# Patient Record
Sex: FEMALE | Employment: STUDENT | URBAN - METROPOLITAN AREA
[De-identification: names, ages, dates, MRNs, and addresses within clinical notes are randomized per-mention and may not be internally consistent; named-entity substitution may affect disease eponyms.]

---
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
## 2022-01-03 NOTE — PROGRESS NOTES
[facility]            SUBJECTIVE:     Manda Lincoln is a 21 year old female [employer]  with presenting to clinic today for follow up of bilateral hip and low back pain.    Overall things are the same. She had about three weeks off over break. She didn't skate at all and her hips felt better but her back remained the same. After coming back to practice her hips were back to the same by the end of the first practice.     She did try the relafen and didn't notice much of a difference. She isn't taking any medications now. The only medications she can recall helping are the diclofenac after which she had a rash and so it was discontinued and the medrol dose back. With the steroids she remembers her back feeling a lot better and her hips improving but not resolving. She isn't sure how long the effect lasted.     She denies any personal history of autoimmune disorders, thyroid disorders or rheumatologic conditions. She has had problems with her thumb, elbow and knee but these are all injury related. No other joint involvement. No nail or hair changes. She does get a red rash on her upper cheeks, not extending over the bridge of her nose. It comes out when her heart rate is elevated, like with exercise and is common for her, people will often ask if she has a sun burn it is so noticeable. She also will get a red rash on her legs with heat sometimes, either when she is sweating or when she is exposed to heat such as in the shower.     She does have a history of GI issues with constipation, bloating and gas. She had a normal work up by GI but was referred to a dietician and she eliminated gluten, poultry and eggs and this resolved her issues. She also has a lactose intolerance but takes lactaid and that is fine. She will occasionally have flares but usually can trace this back to her diet.     No autoimmune disorders in her family that she knows of. No  "one with bad joint issues that she is aware of. Her grandfather has bad rashes that they have tried to treat and hasn't work, she isn't sure what it is. Her grandmother who passed away may have had an autoimmune disorder and required a lung transplant, but she is unsure. No family history of lupus, RA, raynaud's, thyroid conditions.     No issue with losing weight. No fevers or chills. Has been feeling tired for a long time, feels more pronounced this year. .      Is has been taking Vit D daily for the makes 1-2 months.      PMH, Medications and Allergies were reviewed and updated as needed.    ROS:  As noted above otherwise negative.    There is no problem list on file for this patient.      Current Outpatient Medications   Medication Sig Dispense Refill     escitalopram (LEXAPRO) 20 MG tablet Take 1 tablet (20 mg) by mouth daily 93 tablet 0     escitalopram (LEXAPRO) 20 MG tablet Take 1 tablet (20 mg) by mouth daily 30 tablet 3     nabumetone (RELAFEN) 750 MG tablet Take 1 tablet (750 mg) by mouth 2 times daily 60 tablet 0     predniSONE (DELTASONE) 20 MG tablet Take 2 tablets (40 mg) by mouth daily 14 tablet 0              OBJECTIVE:     Vitals:   Vitals:    01/04/22 0930   BP: 116/69   Pulse: 74   Weight: 78.3 kg (172 lb 9.6 oz)   Height: 1.753 m (5' 9\")     BMI: Body mass index is 25.49 kg/m .    Gen:  Well nourished and in no acute distress  HEENT: Extraocular movement intact.  Neck: supple  Skin: No rash, erythema, ecchymosis or obvious abnormality  Psych: Euthymic   Neuro: Alert and oriented.           ASSESSMENT & PLAN:      Manda was seen today for hip pain and back pain.    Diagnoses and all orders for this visit:    Bilateral hip pain, Lumbar Back Pain: Persistent bilateral hip pain and lumbar back pain with concern for possible inflammatory or autoimmune process. No clear family history of autoimmune diseases although patient does have a gluten intolerance, has had several reactions to medications " and does get a facial rash that is similar to a malar rash but does not involve the bridge of her nose. Given her persistent symptoms will pursue further rheumatologic work up as well as treat with 7 day course of prednisone followed by increased dose of NSAID. If workup is revealing and her hips continue to bother diagnostic injection of hip should be considered.  -     Cyclic Citrullinated Peptide Antibody IgG; Future  -     Rheumatoid factor; Future  -     Anti Nuclear Neisha IgG by IFA with Reflex; Future  -     CRP inflammation; Future  -     Erythrocyte sedimentation rate auto; Future  -     Vitamin D Deficiency; Future  -     CBC with platelets differential; Future  -     HLA-B27 Typing; Future  -     predniSONE (DELTASONE) 20 MG tablet; Take 2 tablets (40 mg) by mouth daily  -     nabumetone (RELAFEN) 750 MG tablet; Take 1 tablet (750 mg) by mouth 2 times daily after completion of prednisone  -     Discussed possible next steps including potential diagnostic femoral acetabular hip injection    Options for treatment and/or follow-up care were reviewed with the patient and , Kenya Meredith ATC. Patient was actively involved in the decision making process. Patient verbalized understanding and was in agreement with the plan.    The patient was discussed with Dr.Suzanne MARY Coon MD, CAQ, CCD    Sol Palomo MD  Primary Care Sports Medicine Fellow

## 2022-01-03 NOTE — PROGRESS NOTES
[facility]  Eliel ATC follow-up note  Date of service performed: 10/26/2021    Concern/injury: B hips    Started Alta on new rehab program. She reports her hips are feeling decent. Rehab program below.    MONDAY TUESDAY WEDNESDAY THURSDAY FRIDAY            ? Straight leg raise - over dumbbell 2x5 each side    ? Quadruped CARS 10x around each side    ? Posterior Pelvic Tilts x20    ? Posterior Pelvic Tilt + March 2x8 each side    ? Clamshells with band 2x20 each side    ? Single Leg Glute Bridge 2x10 each side    ? Swiss ball squats against wall 2x12    ? Single leg balance with hip flexion 2x12 each side    ? Single leg squat to seat 2x6 each side   ? Straight leg raise - over dumbbell 2x5 each    ? Quadruped CARS 10x around each side    ? Posterior Pelvic Tilts x20    ? Single leg balance with half circles with band 2x8 each side    ? Reverse step up 2x10 each side    ? Standing 4-way hip 2x10 each way    ? Fire Hydrants 2x12 each side    ? Cup  2x6     ? Glute bridge with ball squeeze + leg extension 2x5   ? Straight leg raise - over dumbbell 2x5 each    ? Quadruped CARS 10x around each side    ? Posterior Pelvic Tilts x20    ? Swiss ball squats against wall 2x12    ? Lateral band walk - straight leg 2x    ? Monster walks 2x    ? Clamshells with band 2x20 each side    ? Quadruped opposite arm/leg 2x8 each    ? Plank with hip flexion 2x10 each side   ? Straight leg raise - over dumbbell 2x5 each    ? Quadruped CARS 10x around each side    ? Posterior Pelvic Tilts with marches 2x8 each side    ? Windshield Wipers 2x10 each side    ? Single leg balance with hip flexion 2x12 each side    ? Ball squeezes 2x25    ? Cup  2x6    ? Eccentric hamstring curl 2x10    ? Single leg squat to seat 2x6    ? Standing 4 way hip 2x10 each way       ? Straight leg raise - over dumbbell 2x5 each    ? Quadruped CARS 10x around each side    ? Posterior Pelvic Titls x20    ? Lateral band walk -  bent knee 2x    ? Monster walk 2x    ? Fire Hydrants 2x12    ? Single leg balance with half circles with band 2x8 each side    ? Plank Step Outs 2x10 each side         Kenya Meredith, ATC

## 2022-01-03 NOTE — PROGRESS NOTES
[facility]  [facility] ATC follow-up note  Date of service performed: 12/6/2021    Concern/injury: Bilateral hip pain, low back pain    Alta has continued to have bilateral hip pain and SI joint pain over the past several weeks. This got extremely worse for her (mentally) while on the [place] trip over [facility] as well as this past weekend at [place]. She is getting minimal playing time and that is frustrating for her as well. She has been diligent with her rehab, but does not feel that this is helping much with the hip or back pain. She does not seem interested in any prescription medication or an injection. She is willing to see Dr. Coon for evaluation. I have also offered to schedule her an appointment with Dr. Freed for her hips, but she has declined this for right now.    Assessment/plan: She saw Dr. Coon today. Will get MRI of her low back. Started on Relafen (allergic reaction to voltaren in the past). 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 Follow up with Dr. Coon after imaging.    Kenya Meredith, ATC

## 2022-01-03 NOTE — PROGRESS NOTES
[facility]  Eliel ATC follow-up note  Date of service performed: 11/11/2021    Concern/injury: Bilateral hip pain, SI joint pain    Alta has started to notice increasing SI joint pain, along with her bilateral hip pain. She has continued to be diligent with her rehab daily.     Treatment : R hip cupping, L hip graston. Saw Dr. Navas at his office for dry needling in bilateral hips/low back.    Kenya Meredith, ATC

## 2022-01-03 NOTE — PROGRESS NOTES
[facility]  Eliel ATC follow-up note  Date of service performed: 11/22/2021    Concern/injury: Bilateral hip pain, low back pain    Treatment: Rehab, MET on hips, cupping low back + left sided glute.    Kenya Meredith, ATC

## 2022-01-03 NOTE — PROGRESS NOTES
"[facility]  Eliel ATC follow-up note  Date of service performed: 11/5/2021    Concern/injury: Bilateral hip pain    Alta has been consistent with her rehab this week. She notices her hip pain improves when she \"recovers well\" the night before. She has a woop band that tracks this. She gives an example of not recovering well per her woop band on Wednesday night and feels that her hip pain was much worse on Thursday. On Thursday, we did glute and hip flexor releases/ART.      Kenya Meredith, ATC      "

## 2022-01-03 NOTE — PROGRESS NOTES
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 She has continued to come in for rehab with me this week. Got MRI of back this weekend and will meet with Dr. Coon following [place].      Kenya Meredith, ATC

## 2022-01-03 NOTE — PROGRESS NOTES
[facility] ATHLETICS  Eliel ATC follow-up note  Date of service performed: 1/3/2022    Concern/injury: B hips, low back pain    Alta has returned to practice for three days. The first day of practice was manageable, but yesterday and today she reports her hip pain has worsened to the point she is unable to do rehab prior to practice.    Assessment/plan: Follow up with Dr. Hendricks tomorrow to review MRIs and discuss work up for possible rheumatologic cause of some of her pain.     Kenya Meredith, ATC

## 2022-01-04 ENCOUNTER — OFFICE VISIT (OUTPATIENT)
Dept: FAMILY MEDICINE | Facility: CLINIC | Age: 22
End: 2022-01-04
Payer: COMMERCIAL

## 2022-01-04 ENCOUNTER — LAB (OUTPATIENT)
Dept: LAB | Facility: CLINIC | Age: 22
End: 2022-01-04
Payer: COMMERCIAL

## 2022-01-04 VITALS
SYSTOLIC BLOOD PRESSURE: 116 MMHG | DIASTOLIC BLOOD PRESSURE: 69 MMHG | WEIGHT: 172.6 LBS | BODY MASS INDEX: 25.56 KG/M2 | HEART RATE: 74 BPM | HEIGHT: 69 IN

## 2022-01-04 DIAGNOSIS — M25.552 BILATERAL HIP PAIN: Primary | ICD-10-CM

## 2022-01-04 DIAGNOSIS — M54.50 LUMBAR BACK PAIN: ICD-10-CM

## 2022-01-04 DIAGNOSIS — M25.551 BILATERAL HIP PAIN: Primary | ICD-10-CM

## 2022-01-04 DIAGNOSIS — M25.551 BILATERAL HIP PAIN: ICD-10-CM

## 2022-01-04 DIAGNOSIS — M25.552 BILATERAL HIP PAIN: ICD-10-CM

## 2022-01-04 LAB
BASOPHILS # BLD AUTO: 0.1 10E3/UL (ref 0–0.2)
BASOPHILS NFR BLD AUTO: 1 %
CRP SERPL-MCNC: <2.9 MG/L (ref 0–8)
EOSINOPHIL # BLD AUTO: 0.2 10E3/UL (ref 0–0.7)
EOSINOPHIL NFR BLD AUTO: 2 %
ERYTHROCYTE [DISTWIDTH] IN BLOOD BY AUTOMATED COUNT: 11.9 % (ref 10–15)
ERYTHROCYTE [SEDIMENTATION RATE] IN BLOOD BY WESTERGREN METHOD: 5 MM/HR (ref 0–20)
HCT VFR BLD AUTO: 41.1 % (ref 35–47)
HGB BLD-MCNC: 13.6 G/DL (ref 11.7–15.7)
IMM GRANULOCYTES # BLD: 0 10E3/UL
IMM GRANULOCYTES NFR BLD: 0 %
LYMPHOCYTES # BLD AUTO: 2.6 10E3/UL (ref 0.8–5.3)
LYMPHOCYTES NFR BLD AUTO: 29 %
MCH RBC QN AUTO: 30.1 PG (ref 26.5–33)
MCHC RBC AUTO-ENTMCNC: 33.1 G/DL (ref 31.5–36.5)
MCV RBC AUTO: 91 FL (ref 78–100)
MONOCYTES # BLD AUTO: 0.7 10E3/UL (ref 0–1.3)
MONOCYTES NFR BLD AUTO: 7 %
NEUTROPHILS # BLD AUTO: 5.4 10E3/UL (ref 1.6–8.3)
NEUTROPHILS NFR BLD AUTO: 61 %
NRBC # BLD AUTO: 0 10E3/UL
NRBC BLD AUTO-RTO: 0 /100
PLATELET # BLD AUTO: 220 10E3/UL (ref 150–450)
RBC # BLD AUTO: 4.52 10E6/UL (ref 3.8–5.2)
WBC # BLD AUTO: 9 10E3/UL (ref 4–11)

## 2022-01-04 PROCEDURE — 86038 ANTINUCLEAR ANTIBODIES: CPT | Mod: 90 | Performed by: PATHOLOGY

## 2022-01-04 PROCEDURE — 36415 COLL VENOUS BLD VENIPUNCTURE: CPT | Performed by: PATHOLOGY

## 2022-01-04 PROCEDURE — 85652 RBC SED RATE AUTOMATED: CPT | Performed by: PATHOLOGY

## 2022-01-04 PROCEDURE — 86431 RHEUMATOID FACTOR QUANT: CPT | Mod: 90 | Performed by: PATHOLOGY

## 2022-01-04 PROCEDURE — 86200 CCP ANTIBODY: CPT | Mod: 90 | Performed by: PATHOLOGY

## 2022-01-04 PROCEDURE — 86039 ANTINUCLEAR ANTIBODIES (ANA): CPT | Mod: 90 | Performed by: PATHOLOGY

## 2022-01-04 PROCEDURE — 81374 HLA I TYPING 1 ANTIGEN LR: CPT | Performed by: PATHOLOGY

## 2022-01-04 PROCEDURE — 99000 SPECIMEN HANDLING OFFICE-LAB: CPT | Performed by: PATHOLOGY

## 2022-01-04 PROCEDURE — 82306 VITAMIN D 25 HYDROXY: CPT | Mod: 90 | Performed by: PATHOLOGY

## 2022-01-04 PROCEDURE — 86140 C-REACTIVE PROTEIN: CPT | Performed by: PATHOLOGY

## 2022-01-04 PROCEDURE — 85025 COMPLETE CBC W/AUTO DIFF WBC: CPT | Performed by: PATHOLOGY

## 2022-01-04 RX ORDER — PREDNISONE 20 MG/1
40 TABLET ORAL DAILY
Qty: 14 TABLET | Refills: 0 | Status: SHIPPED | OUTPATIENT
Start: 2022-01-04 | End: 2022-02-16

## 2022-01-04 ASSESSMENT — MIFFLIN-ST. JEOR: SCORE: 1622.29

## 2022-01-04 NOTE — LETTER
1/4/2022      RE: Manda Lincoln  [address]  [place] [facility] [place] [address]       [facility]            SUBJECTIVE:     Manda Lincoln is a 21 year old female [employer]  with presenting to clinic today for follow up of bilateral hip and low back pain.    Overall things are the same. She had about three weeks off over break. She didn't skate at all and her hips felt better but her back remained the same. After coming back to practice her hips were back to the same by the end of the first practice.     She did try the relafen and didn't notice much of a difference. She isn't taking any medications now. The only medications she can recall helping are the diclofenac after which she had a rash and so it was discontinued and the medrol dose back. With the steroids she remembers her back feeling a lot better and her hips improving but not resolving. She isn't sure how long the effect lasted.     She denies any personal history of autoimmune disorders, thyroid disorders or rheumatologic conditions. She has had problems with her thumb, elbow and knee but these are all injury related. No other joint involvement. No nail or hair changes. She does get a red rash on her upper cheeks, not extending over the bridge of her nose. It comes out when her heart rate is elevated, like with exercise and is common for her, people will often ask if she has a sun burn it is so noticeable. She also will get a red rash on her legs with heat sometimes, either when she is sweating or when she is exposed to heat such as in the shower.     She does have a history of GI issues with constipation, bloating and gas. She had a normal work up by GI but was referred to a dietician and she eliminated gluten, poultry and eggs and this resolved her issues. She also has a lactose intolerance but takes lactaid and that is fine. She will occasionally have flares but usually can trace  "this back to her diet.     No autoimmune disorders in her family that she knows of. No one with bad joint issues that she is aware of. Her grandfather has bad rashes that they have tried to treat and hasn't work, she isn't sure what it is. Her grandmother who passed away may have had an autoimmune disorder and required a lung transplant, but she is unsure. No family history of lupus, RA, raynaud's, thyroid conditions.     No issue with losing weight. No fevers or chills. Has been feeling tired for a long time, feels more pronounced this year. .      Is has been taking Vit D daily for the makes 1-2 months.      PMH, Medications and Allergies were reviewed and updated as needed.    ROS:  As noted above otherwise negative.    There is no problem list on file for this patient.      Current Outpatient Medications   Medication Sig Dispense Refill     escitalopram (LEXAPRO) 20 MG tablet Take 1 tablet (20 mg) by mouth daily 93 tablet 0     escitalopram (LEXAPRO) 20 MG tablet Take 1 tablet (20 mg) by mouth daily 30 tablet 3     nabumetone (RELAFEN) 750 MG tablet Take 1 tablet (750 mg) by mouth 2 times daily 60 tablet 0     predniSONE (DELTASONE) 20 MG tablet Take 2 tablets (40 mg) by mouth daily 14 tablet 0              OBJECTIVE:     Vitals:   Vitals:    01/04/22 0930   BP: 116/69   Pulse: 74   Weight: 78.3 kg (172 lb 9.6 oz)   Height: 1.753 m (5' 9\")     BMI: Body mass index is 25.49 kg/m .    Gen:  Well nourished and in no acute distress  HEENT: Extraocular movement intact.  Neck: supple  Skin: No rash, erythema, ecchymosis or obvious abnormality  Psych: Euthymic   Neuro: Alert and oriented.           ASSESSMENT & PLAN:      Manda was seen today for hip pain and back pain.    Diagnoses and all orders for this visit:    Bilateral hip pain, Lumbar Back Pain: Persistent bilateral hip pain and lumbar back pain with concern for possible inflammatory or autoimmune process. No clear family history of autoimmune diseases " although patient does have a gluten intolerance, has had several reactions to medications and does get a facial rash that is similar to a malar rash but does not involve the bridge of her nose. Given her persistent symptoms will pursue further rheumatologic work up as well as treat with 7 day course of prednisone followed by increased dose of NSAID. If workup is revealing and her hips continue to bother diagnostic injection of hip should be considered.  -     Cyclic Citrullinated Peptide Antibody IgG; Future  -     Rheumatoid factor; Future  -     Anti Nuclear Neisha IgG by IFA with Reflex; Future  -     CRP inflammation; Future  -     Erythrocyte sedimentation rate auto; Future  -     Vitamin D Deficiency; Future  -     CBC with platelets differential; Future  -     HLA-B27 Typing; Future  -     predniSONE (DELTASONE) 20 MG tablet; Take 2 tablets (40 mg) by mouth daily  -     nabumetone (RELAFEN) 750 MG tablet; Take 1 tablet (750 mg) by mouth 2 times daily after completion of prednisone  -     Discussed possible next steps including potential diagnostic femoral acetabular hip injection    Options for treatment and/or follow-up care were reviewed with the patient and , Kenya Meredith ATC. Patient was actively involved in the decision making process. Patient verbalized understanding and was in agreement with the plan.    The patient was discussed with Dr.Suzanne MARY Coon MD, CAQ, CCD    Sol Palomo MD  Primary Care Sports Medicine Fellow      Attending Note:   I have discussed this patient and have reviewed the clinical presentation and progress note with the fellow. I agree with the treatment plan as outlined. The plan was formulated with the fellow on the day of the patient's visit.   Lacey Coon MD, CAQ, CCD  [facility]  Sports Medicine and Bone Health

## 2022-01-05 LAB
ANA PAT SER IF-IMP: ABNORMAL
ANA SER QL IF: ABNORMAL
ANA TITR SER IF: ABNORMAL {TITER}
CCP AB SER IA-ACNC: 2.8 U/ML
DEPRECATED CALCIDIOL+CALCIFEROL SERPL-MC: 22 UG/L (ref 20–75)
RHEUMATOID FACT SER NEPH-ACNC: 9 IU/ML

## 2022-01-06 LAB
B LOCUS: NORMAL
B27TEST METHOD: NORMAL

## 2022-01-09 NOTE — PROGRESS NOTES
Attending Note:   I have discussed this patient and have reviewed the clinical presentation and progress note with the fellow. I agree with the treatment plan as outlined. The plan was formulated with the fellow on the day of the patient's visit.   Lacey Coon MD, CAQ, CCD  [facility]  Sports Medicine and Bone Health

## 2022-01-25 ENCOUNTER — OFFICE VISIT (OUTPATIENT)
Dept: FAMILY MEDICINE | Facility: CLINIC | Age: 22
End: 2022-01-25
Payer: COMMERCIAL

## 2022-01-25 VITALS
HEIGHT: 69 IN | BODY MASS INDEX: 25.68 KG/M2 | HEART RATE: 78 BPM | SYSTOLIC BLOOD PRESSURE: 120 MMHG | DIASTOLIC BLOOD PRESSURE: 74 MMHG | WEIGHT: 173.4 LBS

## 2022-01-25 DIAGNOSIS — M54.50 LUMBAR BACK PAIN: ICD-10-CM

## 2022-01-25 DIAGNOSIS — M25.552 BILATERAL HIP PAIN: ICD-10-CM

## 2022-01-25 DIAGNOSIS — M25.551 BILATERAL HIP PAIN: ICD-10-CM

## 2022-01-25 DIAGNOSIS — E55.9 VITAMIN D DEFICIENCY: Primary | ICD-10-CM

## 2022-01-25 ASSESSMENT — MIFFLIN-ST. JEOR: SCORE: 1625.92

## 2022-01-25 NOTE — LETTER
"  1/25/2022      RE: Manda Belcher  [address]  [place] [address]       S: f/u labs and prednisone one week followed by NSAID for B hip and LBP.  -Prednisone helped tremendously and is lessening in terms of her hips (60% improved) but still good in her low back.   -No side effects or problems with the medications.    -Unsure how much the Relafen 750mg bid is helping.   -She has been able to fully practice and play.  These practices have been very demanding due to [place] last weekend and then [employer] this coming weekend.  They should be easier for a while after the [employer] series.         Current Outpatient Medications   Medication     escitalopram (LEXAPRO) 20 MG tablet     nabumetone (RELAFEN) 750 MG tablet     vitamin D3 (CHOLECALCIFEROL) 1.25 MG (87485 UT) capsule     escitalopram (LEXAPRO) 20 MG tablet     predniSONE (DELTASONE) 20 MG tablet     No current facility-administered medications for this visit.         O:  NAD  /74   Pulse 78   Ht 1.753 m (5' 9\")   Wt 78.7 kg (173 lb 6.4 oz)   BMI 25.61 kg/m        Results for JENNIE BELCHER (MRN [id number]) as of 1/25/2022 13:48   Ref. Range 10/11/2021 15:57 10/11/2021 16:32 12/15/2021 13:36 12/15/2021 14:12 1/4/2022 14:47   CRP Inflammation Latest Ref Range: 0.0 - 8.0 mg/L     <2.9   Cyclic Citrullinated Peptide Antibody, IgG Latest Ref Range: <7.0 U/mL     2.8   Rheumatoid Factor Latest Ref Range: <12 IU/mL     9   Vitamin D Deficiency screening Latest Ref Range: 20 - 75 ug/L     22   WBC Latest Ref Range: 4.0 - 11.0 10e3/uL     9.0   Hemoglobin Latest Ref Range: 11.7 - 15.7 g/dL     13.6   Hematocrit Latest Ref Range: 35.0 - 47.0 %     41.1   Platelet Count Latest Ref Range: 150 - 450 10e3/uL     220   RBC Count Latest Ref Range: 3.80 - 5.20 10e6/uL     4.52   MCV Latest Ref Range: 78 - 100 fL     91   MCH Latest Ref Range: 26.5 - 33.0 pg     30.1   MCHC Latest Ref Range: 31.5 - 36.5 g/dL     33.1   RDW Latest Ref Range: 10.0 - 15.0 %   "   11.9   % Neutrophils Latest Units: %     61   % Lymphocytes Latest Units: %     29   % Monocytes Latest Units: %     7   % Eosinophils Latest Units: %     2   % Basophils Latest Units: %     1   Absolute Basophils Latest Ref Range: 0.0 - 0.2 10e3/uL     0.1   Absolute Eosinophils Latest Ref Range: 0.0 - 0.7 10e3/uL     0.2   Absolute Immature Granulocytes Latest Ref Range: <=0.4 10e3/uL     0.0   Absolute Lymphocytes Latest Ref Range: 0.8 - 5.3 10e3/uL     2.6   Absolute Monocytes Latest Ref Range: 0.0 - 1.3 10e3/uL     0.7   % Immature Granulocytes Latest Units: %     0   Absolute Neutrophils Latest Ref Range: 1.6 - 8.3 10e3/uL     5.4   Absolute NRBCs Latest Units: 10e3/uL     0.0   NRBCs per 100 WBC Latest Ref Range: <1 /100     0   Sed Rate Latest Ref Range: 0 - 20 mm/hr     5   B locus Unknown     B27 Neg   T81Cbdo Method Unknown     SSOP   ANTI NUCLEAR RAYO IGG BY IFA WITH REFLEX Unknown     Rpt (A)   MR ELBOW RIGHT W/O CONTRAST Unknown    Rpt    MR HIP LEFT W/O CONTRAST Unknown Rpt       MR HIP RIGHT W/O CONTRAST Unknown  Rpt      MR LUMBAR SPINE W/O CONTRAST Unknown   Rpt     TOÑO interpretation Latest Ref Range: Negative      Borderline Positive (A)   TOÑO pattern 1 Unknown     Speckled   TOÑO titer 1 Unknown     1:40       A: 1. Chronic bilateral hip (B BABATUNDE and possible labral tearing) and LBP in a UM :  Good response to prednisone.  Lab testing is essentially reasurring except for a borderline positive  TOÑO  1:40 speckled.   2. Vitamin D Deficiency    P:  We discussed some medication alternatives to see if she can get a better improvement on NSAIDs as we cannot continue to prescribe repeated courses of prednisone.  She understands and feels that she would like to keep playing without significant changes to the treatment for now since she is still doing much better than previously.     -We will treat her with Vit D3 15054 international unit(s) per week for 12 weeks. She can take one pill twice  per week for two weeks and then switch to weekly.     -We discussed the TOÑO result and I explained that false positives in young women are common with this test.  I would like to repeat it in approx 6 weeks and add some additional testing.      -We revisited the idea of a diagnostic hip joint injection vs a diagnostic and therapeutic injection with steroids to help manage her hip pain.  We will hold off on this for now.     Kenya Meredith ATC, was present for the entire appt.     Lacey Coon MD, CAQ, FACSM, CCD  [facility]  Sports Medicine and Bone Health  Team Physician;  Athletics

## 2022-01-25 NOTE — PROGRESS NOTES
"S: f/u labs and prednisone one week followed by NSAID for B hip and LBP.  -Prednisone helped tremendously and is lessening in terms of her hips (60% improved) but still good in her low back.   -No side effects or problems with the medications.    -Unsure how much the Relafen 750mg bid is helping.   -She has been able to fully practice and play.  These practices have been very demanding due to [place] last weekend and then [employer] this coming weekend.  They should be easier for a while after the [employer] series.         Current Outpatient Medications   Medication     escitalopram (LEXAPRO) 20 MG tablet     nabumetone (RELAFEN) 750 MG tablet     vitamin D3 (CHOLECALCIFEROL) 1.25 MG (30898 UT) capsule     escitalopram (LEXAPRO) 20 MG tablet     predniSONE (DELTASONE) 20 MG tablet     No current facility-administered medications for this visit.         O:  NAD  /74   Pulse 78   Ht 1.753 m (5' 9\")   Wt 78.7 kg (173 lb 6.4 oz)   BMI 25.61 kg/m        Results for JENNIE BELCHER (MRN [id number]) as of 1/25/2022 13:48   Ref. Range 10/11/2021 15:57 10/11/2021 16:32 12/15/2021 13:36 12/15/2021 14:12 1/4/2022 14:47   CRP Inflammation Latest Ref Range: 0.0 - 8.0 mg/L     <2.9   Cyclic Citrullinated Peptide Antibody, IgG Latest Ref Range: <7.0 U/mL     2.8   Rheumatoid Factor Latest Ref Range: <12 IU/mL     9   Vitamin D Deficiency screening Latest Ref Range: 20 - 75 ug/L     22   WBC Latest Ref Range: 4.0 - 11.0 10e3/uL     9.0   Hemoglobin Latest Ref Range: 11.7 - 15.7 g/dL     13.6   Hematocrit Latest Ref Range: 35.0 - 47.0 %     41.1   Platelet Count Latest Ref Range: 150 - 450 10e3/uL     220   RBC Count Latest Ref Range: 3.80 - 5.20 10e6/uL     4.52   MCV Latest Ref Range: 78 - 100 fL     91   MCH Latest Ref Range: 26.5 - 33.0 pg     30.1   MCHC Latest Ref Range: 31.5 - 36.5 g/dL     33.1   RDW Latest Ref Range: 10.0 - 15.0 %     11.9   % Neutrophils Latest Units: %     61   % Lymphocytes Latest Units: %     29   % " Monocytes Latest Units: %     7   % Eosinophils Latest Units: %     2   % Basophils Latest Units: %     1   Absolute Basophils Latest Ref Range: 0.0 - 0.2 10e3/uL     0.1   Absolute Eosinophils Latest Ref Range: 0.0 - 0.7 10e3/uL     0.2   Absolute Immature Granulocytes Latest Ref Range: <=0.4 10e3/uL     0.0   Absolute Lymphocytes Latest Ref Range: 0.8 - 5.3 10e3/uL     2.6   Absolute Monocytes Latest Ref Range: 0.0 - 1.3 10e3/uL     0.7   % Immature Granulocytes Latest Units: %     0   Absolute Neutrophils Latest Ref Range: 1.6 - 8.3 10e3/uL     5.4   Absolute NRBCs Latest Units: 10e3/uL     0.0   NRBCs per 100 WBC Latest Ref Range: <1 /100     0   Sed Rate Latest Ref Range: 0 - 20 mm/hr     5   B locus Unknown     B27 Neg   H69Ccui Method Unknown     SSOP   ANTI NUCLEAR RAYO IGG BY IFA WITH REFLEX Unknown     Rpt (A)   MR ELBOW RIGHT W/O CONTRAST Unknown    Rpt    MR HIP LEFT W/O CONTRAST Unknown Rpt       MR HIP RIGHT W/O CONTRAST Unknown  Rpt      MR LUMBAR SPINE W/O CONTRAST Unknown   Rpt     TOÑO interpretation Latest Ref Range: Negative      Borderline Positive (A)   TOÑO pattern 1 Unknown     Speckled   TOÑO titer 1 Unknown     1:40       A: 1. Chronic bilateral hip (B BABATUNDE and possible labral tearing) and LBP in a UM :  Good response to prednisone.  Lab testing is essentially reasurring except for a borderline positive  TOÑO  1:40 speckled.   2. Vitamin D Deficiency    P:  We discussed some medication alternatives to see if she can get a better improvement on NSAIDs as we cannot continue to prescribe repeated courses of prednisone.  She understands and feels that she would like to keep playing without significant changes to the treatment for now since she is still doing much better than previously.     -We will treat her with Vit D3 90642 international unit(s) per week for 12 weeks. She can take one pill twice per week for two weeks and then switch to weekly.     -We discussed the TOÑO result and I  explained that false positives in young women are common with this test.  I would like to repeat it in approx 6 weeks and add some additional testing.      -We revisited the idea of a diagnostic hip joint injection vs a diagnostic and therapeutic injection with steroids to help manage her hip pain.  We will hold off on this for now.     Kenya Meredith ATC, was present for the entire appt.     Lacey Coon MD, CAQ, FACSM, CCD  [facility]  Sports Medicine and Bone Health  Team Physician;  Athletics

## 2022-02-16 ENCOUNTER — OFFICE VISIT (OUTPATIENT)
Dept: ORTHOPEDICS | Facility: CLINIC | Age: 22
End: 2022-02-16
Payer: COMMERCIAL

## 2022-02-16 VITALS
SYSTOLIC BLOOD PRESSURE: 122 MMHG | DIASTOLIC BLOOD PRESSURE: 71 MMHG | HEIGHT: 69 IN | HEART RATE: 87 BPM | WEIGHT: 174.4 LBS | BODY MASS INDEX: 25.83 KG/M2

## 2022-02-16 DIAGNOSIS — F41.8 DEPRESSION WITH ANXIETY: Primary | ICD-10-CM

## 2022-02-16 RX ORDER — ESCITALOPRAM OXALATE 10 MG/1
10 TABLET ORAL DAILY
Qty: 90 TABLET | Refills: 3 | Status: SHIPPED | OUTPATIENT
Start: 2022-02-16 | End: 2023-02-18

## 2022-02-16 RX ORDER — ESCITALOPRAM OXALATE 20 MG/1
20 TABLET ORAL DAILY
Qty: 90 TABLET | Refills: 0 | Status: SHIPPED | OUTPATIENT
Start: 2022-02-16 | End: 2022-04-05

## 2022-02-16 ASSESSMENT — ANXIETY QUESTIONNAIRES
3. WORRYING TOO MUCH ABOUT DIFFERENT THINGS: MORE THAN HALF THE DAYS
2. NOT BEING ABLE TO STOP OR CONTROL WORRYING: SEVERAL DAYS
7. FEELING AFRAID AS IF SOMETHING AWFUL MIGHT HAPPEN: NOT AT ALL
GAD7 TOTAL SCORE: 8
1. FEELING NERVOUS, ANXIOUS, OR ON EDGE: SEVERAL DAYS
6. BECOMING EASILY ANNOYED OR IRRITABLE: NEARLY EVERY DAY
IF YOU CHECKED OFF ANY PROBLEMS ON THIS QUESTIONNAIRE, HOW DIFFICULT HAVE THESE PROBLEMS MADE IT FOR YOU TO DO YOUR WORK, TAKE CARE OF THINGS AT HOME, OR GET ALONG WITH OTHER PEOPLE: VERY DIFFICULT
5. BEING SO RESTLESS THAT IT IS HARD TO SIT STILL: NOT AT ALL

## 2022-02-16 ASSESSMENT — PATIENT HEALTH QUESTIONNAIRE - PHQ9: 5. POOR APPETITE OR OVEREATING: SEVERAL DAYS

## 2022-02-16 NOTE — PROGRESS NOTES
"[facility] CLINIC FOLLOW UP    Manda Lincoln MRN# [id number]   Age: 21 year old YOB: 2000           Chief Complaint:     Mental health follow up          History of Present Illness:     22 yo [place] [employer] [facility] here to follow up anxiety and depression. She has been on lexapro 20 mg which had been working well, but more recently has felt increased anxiety and having panic attacks and feeling more emotionally labile. Wondering if she can increase med dose. Has not been seeing psychologist. No SI/SH.           Medications:     Current Outpatient Medications   Medication Sig     escitalopram (LEXAPRO) 10 MG tablet Take 1 tablet (10 mg) by mouth daily     escitalopram (LEXAPRO) 20 MG tablet Take 1 tablet (20 mg) by mouth daily     escitalopram (LEXAPRO) 20 MG tablet Take 1 tablet (20 mg) by mouth daily     vitamin D3 (CHOLECALCIFEROL) 1.25 MG (04684 UT) capsule Take 1 capsule (50,000 Units) by mouth every 7 days     No current facility-administered medications for this visit.             Allergies:      Allergies   Allergen Reactions     Amoxicillin      Penicillin G             Review of Systems:   A comprehensive 10 point review of systems (constitutional, ENT, cardiac, peripheral vascular, respiratory, GI, , Musculoskeletal, skin, Neurological) was performed and found to be negative except as described in this note.           Physical Exam:   COMPLETE EXAMINATION:   VITAL SIGNS: /71   Pulse 87   Ht 1.753 m (5' 9\")   Wt 79.1 kg (174 lb 6.4 oz)   BMI 25.75 kg/m    GEN: Alert, well-nourished, and in no distress.   HEENT:   Head: Normocephalic and atraumatic.   Eyes: PERRLA, EOMI  NEURO: Alert and oriented to person, place, and time. Gait normal.   PSYCH: Mood, memory, affect and judgment normal.         Assessment:     Depression/anxiety        Plan:     1. Increase lexapro to 30 mg (20 +10 mg tablets) daily.   2. Start sports psych.  3. Follow up in 1 month.    Karina Najera " DEB Meredith was present for the entire visit.

## 2022-02-16 NOTE — LETTER
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 She has been on lexapro 20 mg which had been working well, but more recently has felt increased anxiety and having panic attacks and feeling more emotionally labile. Wondering if she can increase med dose. Has not been seeing psychologist. No SI/SH.           Medications:     Current Outpatient Medications   Medication Sig     escitalopram (LEXAPRO) 10 MG tablet Take 1 tablet (10 mg) by mouth daily     escitalopram (LEXAPRO) 20 MG tablet Take 1 tablet (20 mg) by mouth daily     escitalopram (LEXAPRO) 20 MG tablet Take 1 tablet (20 mg) by mouth daily     vitamin D3 (CHOLECALCIFEROL) 1.25 MG (00574 UT) capsule Take 1 capsule (50,000 Units) by mouth every 7 days     No current facility-administered medications for this visit.             Allergies:      Allergies   Allergen Reactions     Amoxicillin      Penicillin G             Review of Systems:   A comprehensive 10 point review of systems (constitutional, ENT, cardiac, peripheral vascular, respiratory, GI, , Musculoskeletal, skin, Neurological) was performed and found to be negative except as described in this note.           Physical Exam:   COMPLETE EXAMINATION:   VITAL SIGNS: /71   Pulse 87   Ht 1.753 m (5' 9\")   Wt 79.1 kg (174 lb 6.4 oz)   BMI 25.75 kg/m    GEN: Alert, well-nourished, and in no distress.   HEENT:   Head: Normocephalic and atraumatic.   Eyes: PERRLA, EOMI  NEURO: Alert and oriented to person, place, and time. Gait normal.   PSYCH: Mood, memory, affect and judgment normal.         Assessment:     Depression/anxiety        Plan:     1. Increase lexapro to 30 mg (20 +10 mg tablets) " daily.   2. Start sports psych.  3. Follow up in 1 month.    Karina Najera M.D.    DELMY Meredith was present for the entire visit.        Karina Najera MD

## 2022-02-17 ASSESSMENT — ANXIETY QUESTIONNAIRES: GAD7 TOTAL SCORE: 8

## 2022-03-04 NOTE — PROGRESS NOTES
[facility] ATHLETICS  [facility] DELMY follow-up note  Date of service performed: 2/28/2022    Concern/injury: Bilateral hip pain    Alta saw Dr. Freed and [facility] in [place] today. Went over imaging and treatment options, including surgical and conservative options. Since she has struggled this season while being diligent with rehab and not finding many treatment options to relieve the pain, Alta has elected to do surgery on both hips after the season ends.     Because both of her hips bother her the same amount and they alternate which one is worse, she has elected to do her right hip first and her left hip 4 weeks after that.    She is scheduled for surgery on her right hip on April 15th and her left hip on May 17th. Both will be performed at [facility] in [place]. She checked with her insurance to ensure it is in network, which she says that it is. She will have pre-op physical with Dr. Najera prior to the surgeries. She will continue doing daily rehab to strengthen both hips and core before surgery.    Kenya Meredith, ATC

## 2022-03-04 NOTE — PROGRESS NOTES
"[facility] ATHLETICS  [facility] ATC follow-up note  Date of service performed: 2/3/2022    Concern/injury: Bilateral hip pain    Alta has begun to notice an increase in pain in her hips again. Does not feel like the prescription anti-inflammatory is doing much, but also not taking it consistently.     She noticed soreness in her hips yesterday after doing bear crawls the day before as part of her rehab. Today, she has a lot of pain in both hips and is unable to do rehab before or after practice due tot he pain. She reports a \"pinching\" pain in her left hip. We did PROM and there was a large \"clunk\" in her left hip when doing motion.    Assessment/plan: Continue rehab. Will plan on having her see Dr. Freed to discuss treatment options for when the season is over.    Kenya Meredith, ATC      "

## 2022-03-04 NOTE — PROGRESS NOTES
"[facility] ATHLETICS  [facility] ATC follow-up note  Date of service performed: 2/19/2022    Concern/injury: Bilateral Hip Pain    Alta discontinued use of prescription anti-inflammatory, finding that it did not provide much relief. Her hip pain has returned after completing the prednisone. She has been taking OTC naproxen 1x/day on days it is really bothering her and on game days. She has been able to practice and play fully, but she will notice they get extremely painful with a \"deep\" pain when she does a lot of transitions or battling on the ice. She has been extremely diligent with daily rehab. She has an appointment with Dr. Freed on 2/28 to discuss treatment options after the season ends.       Kenya Meredith, ATC      "

## 2022-03-04 NOTE — PROGRESS NOTES
[facility] ATHLETICS  Eliel ATC follow-up note  Date of service performed: 1/15/2021    Concern/injury: Bilateral hip pain    Alta reports hip pain is still doing better after being off of prednisone for a few days. She has begun to take relafen, but does not feel like it is doing much. She does not have any LBP currently.       Kenya Meredith, ATC

## 2022-03-04 NOTE — PROGRESS NOTES
[facility] ATHLETICS  Eliel ATC follow-up note  Date of service performed: 1/5/2022    Concern/injury: Bilateral hips, low back pain    Assessment/plan: Alta began taking prednisone prescribed to her yesterday for seven days. She reports some improvement in her back pain but not in her hips yet.    She is currently practicing fully, but has been modified in the weight room - limiting hip flexion and lower body exercises that increase pain in her hips.     Kenya Meredith, ATC

## 2022-03-04 NOTE — PROGRESS NOTES
[facility] ATHLETICS  Eliel ATC follow-up note  Date of service performed: 1/17/2022    Concern/injury: Bilateral hip pain    New rehab plan that Alta will begin today.    MONDAY TUESDAY WEDNESDAY THURSDAY FRIDAY          EVERY DAY: CARS (range of motion - on hands and knees, circles), Prone CARS (on stomach - bend knee up lift hip up then move leg out to side and let leg come back down to ground), dumbbell hip overs     ? Side Plank Clam Shells 2x15 each side    ? 3 point touch - single leg squat with band 2x5 times around each side    ? Bear sit knee kick outs 2x8 each leg    ? Kneeling hip flexion - 2x8 (hold 2 seconds each rep) each side    ? Standing to kneeling on pad 2x6 each side    ? Single leg glute bridge with abduction sweep 2x8 each side    ? Seated hip internal rotation (band around ankles, ball between knees) 2x12    ? Single leg balance with cross body reach with opposite arm (left leg reach with right arm; right leg reach with left arm) 2x8 each side                     ? Single leg squat (assisted with ball on bent knee against wall) 2x6    ? Single leg balance - straight leg forward and backwards with band 2x15    ? Quadruped hip extension to abduction 2x10 each side    ? Reverse clam shells (band around ankles, lift top ankle off of bottom ankle) 2x15    ? Single leg balance on bosu ball + catch    ? Lying hamstring curl with glider 2x15    ? Seated hip flexion with band 2x12 each side    ? Side plank hip abduction with band 2x10 each side                          ? Side Plank Clam Shells 2x15    ? Lateral band walk     ? Monster walk    ? Bosu ball squat + catch 2x10    ? Opposite arm/leg with swiss ball 2x8 each side    ? Single leg deadlift 2x15    ? Kneeling hip flexion - 2x8 (hold 2 seconds each rep) each side    ? Wall Sit 2x30 sec    ? Deadbugs (arms only - band around hands)       ? 3 point touch single leg squat with band 2x5 times around    ? Bear sit knee kick outs 2x8  each leg    ? Banded squares 4x5 times around    ? Quadruped hip extension to abduction 2x10 each side    ? Swiss ball squats with overhead ball hold 2x10    ? Glider - abduction and backwards half circles 2x8 each side with band    ? Standing to kneeling with pad 2x6 each    ? Single leg glute bridge with abduction sweep 2x8 each side       ? Side Plank Clam Shells 2x15    ? Reverse clam shells (band around ankles, lift top ankle off of bottom ankle) 2x15    ? Single leg squat (assisted with ball on bent knee against wall) 2x6    ?Seated hip internal rotation (band around ankles, ball between knees) 2x12    ? Opposite arm/leg with swiss ball 2x8 each side    ? Lying hamstring curl with glider 2x15    ? Side plank hip abduction with band 2x10 each side    ? Single leg balance with cross body reach with opposite arm (left leg reach with right arm; right leg reach with left arm) 2x8 each side             Kenya Meredith, ATC

## 2022-03-04 NOTE — PROGRESS NOTES
[facility] ATHLETICS  Eliel BROCK follow-up note  Date of service performed: 1/11/2022    Concern/injury: Bilateral hips, low back pain    Today is Alta's last day of 7 day course of prednisone. She reports this has helped tremendously with her LBP. Says it has helped with her hip pain as well, but she does still have some hip pain.    She continues to do daily rehab. Will update her rehab exercises, as they are getting easier for her.      Kenya Meredith ATC

## 2022-03-05 ENCOUNTER — OFFICE VISIT (OUTPATIENT)
Dept: ORTHOPEDICS | Facility: CLINIC | Age: 22
End: 2022-03-05
Payer: COMMERCIAL

## 2022-03-05 ENCOUNTER — DOCUMENTATION ONLY (OUTPATIENT)
Dept: FAMILY MEDICINE | Facility: CLINIC | Age: 22
End: 2022-03-05
Payer: COMMERCIAL

## 2022-03-05 DIAGNOSIS — S06.0X0A CONCUSSION WITHOUT LOSS OF CONSCIOUSNESS, INITIAL ENCOUNTER: Primary | ICD-10-CM

## 2022-03-05 NOTE — LETTER
3/5/2022      RE: Manda Lincoln  [address]  [place] [address]       [facility] GAME DAY CONCUSSION CONSULT    Manda Lincoln MRN# [id number]   Age: 21 year old YOB: 2000           Chief Complaint:     Possible concussion          History of Present Illness:     Manda Lincoln is a 21 year old female who is seen for evaluation of a possible concussion that occurred in the second period of the game when she slid hard into the metal bar of the net striking the back of her head. Injury was witnessed by medical staff and reviewed again on video. She said she does not have a recollection of the mechanism of injury, but does not think she lost consciousness. She was slow to get up and initially felt dazed with a headache. She was taken out of the game and immediately evaluated in the training room She says she now feels fine.      Immediate Symptoms: felt dazed, out of it, headache      Past pertinent history:      Depression: Yes:      Anxiety: Yes:      Learning disability: no     ADHD: no     Past History of concussions: No    Patient's past medical, surgical, social and family histories reviewed:  Depression    REVIEW OF SYSTEMS:  CONSTITUTIONAL:NEGATIVE for fever, chills, change in weight  INTEGUMENTARY/SKIN: NEGATIVE for worrisome rashes, moles or lesions  MUSCULOSKELETAL:See HPI above  NEURO: NEGATIVE for weakness, dizziness or paresthesias         Medications:     Current Outpatient Medications   Medication Sig     escitalopram (LEXAPRO) 10 MG tablet Take 1 tablet (10 mg) by mouth daily     escitalopram (LEXAPRO) 20 MG tablet Take 1 tablet (20 mg) by mouth daily     vitamin D3 (CHOLECALCIFEROL) 1.25 MG (32387 UT) capsule Take 1 capsule (50,000 Units) by mouth every 7 days     escitalopram (LEXAPRO) 20 MG tablet Take 1 tablet (20 mg) by mouth daily     No current facility-administered medications for this visit.             Allergies:      Allergies   Allergen Reactions      Amoxicillin      Penicillin G             Review of Systems:   A comprehensive 10 point review of systems (constitutional, ENT, cardiac, peripheral vascular, respiratory, GI, , Musculoskeletal, skin, Neurological) was performed and found to be negative except as described in this note.           Physical Exam:   COMPLETE EXAMINATION:   VITAL SIGNS: There were no vitals taken for this visit.  GEN: healthy, alert and no distress   HEENT:   Head: Normocephalic and atraumatic.   Eyes: PERRLA, EOMI, Nystagmus: No; Painful Eye movements: No  Mouth/Throat: MMM, No erythema or exudate.   Neck:  supple, non-tender, FULL ROM  CV: S1S2 normal, RRR, no murmur  CHEST: CTA, Easy effort, No rales or wheezes  ABD: Soft. Nontender/nondistended, no HSM/mass, no rebound/guarding.  SKIN: No rash, warmth or erythema  PSYCH:mentation appears normal and affect normal/bright  NEURO: Normal strength and tone, mentation intact and speech normal  Gait: normal in skates  Balance Testing:         Double leg stance in skates eyes closed normal  Convergence Testing: Normal (6-8cm)    VOMS: Smooth Pursuit: normal    Saccades horizontal: normal    Saccades vertical: normal    VORx1: normal    VOR cancellation: normal    Cognitive:  AxO to time, place, period and score of game            Assessment:     Presumed Concussion         Plan:     1. Discussed diagnosis and pulled from game. Concussion protocol will be followed.  2. Recommended rest from physical and cognitive activities.    3. Follow up in training room tomorrow for SCAT and reassessment.    DELMY Meredith was present for the entire visit.          Karina Najera MD

## 2022-03-06 NOTE — PROGRESS NOTES
[facility] GAME DAY CONCUSSION CONSULT    Manda Lincoln MRN# [id number]   Age: 21 year old YOB: 2000           Chief Complaint:     Possible concussion          History of Present Illness:     Manda Lincoln is a 21 year old female who is seen for evaluation of a possible concussion that occurred in the second period of the game when she slid hard into the metal bar of the net striking the back of her head. Injury was witnessed by medical staff and reviewed again on video. She said she does not have a recollection of the mechanism of injury, but does not think she lost consciousness. She was slow to get up and initially felt dazed with a headache. She was taken out of the game and immediately evaluated in the training room She says she now feels fine.      Immediate Symptoms: felt dazed, out of it, headache      Past pertinent history:      Depression: Yes:      Anxiety: Yes:      Learning disability: no     ADHD: no     Past History of concussions: No    Patient's past medical, surgical, social and family histories reviewed:  Depression    REVIEW OF SYSTEMS:  CONSTITUTIONAL:NEGATIVE for fever, chills, change in weight  INTEGUMENTARY/SKIN: NEGATIVE for worrisome rashes, moles or lesions  MUSCULOSKELETAL:See HPI above  NEURO: NEGATIVE for weakness, dizziness or paresthesias         Medications:     Current Outpatient Medications   Medication Sig     escitalopram (LEXAPRO) 10 MG tablet Take 1 tablet (10 mg) by mouth daily     escitalopram (LEXAPRO) 20 MG tablet Take 1 tablet (20 mg) by mouth daily     vitamin D3 (CHOLECALCIFEROL) 1.25 MG (33162 UT) capsule Take 1 capsule (50,000 Units) by mouth every 7 days     escitalopram (LEXAPRO) 20 MG tablet Take 1 tablet (20 mg) by mouth daily     No current facility-administered medications for this visit.             Allergies:      Allergies   Allergen Reactions     Amoxicillin      Penicillin G             Review of Systems:   A comprehensive 10 point  review of systems (constitutional, ENT, cardiac, peripheral vascular, respiratory, GI, , Musculoskeletal, skin, Neurological) was performed and found to be negative except as described in this note.           Physical Exam:   COMPLETE EXAMINATION:   VITAL SIGNS: There were no vitals taken for this visit.  GEN: healthy, alert and no distress   HEENT:   Head: Normocephalic and atraumatic.   Eyes: PERRLA, EOMI, Nystagmus: No; Painful Eye movements: No  Mouth/Throat: MMM, No erythema or exudate.   Neck:  supple, non-tender, FULL ROM  CV: S1S2 normal, RRR, no murmur  CHEST: CTA, Easy effort, No rales or wheezes  ABD: Soft. Nontender/nondistended, no HSM/mass, no rebound/guarding.  SKIN: No rash, warmth or erythema  PSYCH:mentation appears normal and affect normal/bright  NEURO: Normal strength and tone, mentation intact and speech normal  Gait: normal in skates  Balance Testing:         Double leg stance in skates eyes closed normal  Convergence Testing: Normal (6-8cm)    VOMS: Smooth Pursuit: normal    Saccades horizontal: normal    Saccades vertical: normal    VORx1: normal    VOR cancellation: normal    Cognitive:  AxO to time, place, period and score of game            Assessment:     Presumed Concussion         Plan:     1. Discussed diagnosis and pulled from game. Concussion protocol will be followed.  2. Recommended rest from physical and cognitive activities.    3. Follow up in training room tomorrow for SCAT and reassessment.    DELMY Meredith was present for the entire visit.         Alert-The patient is alert, awake and responds to voice. The patient is oriented to time, place, and person. The triage nurse is able to obtain subjective information.

## 2022-03-09 NOTE — PROGRESS NOTES
"[facility] ATHLETICS  [facility] follow-up note  Date of service performed: 3/7/2022    Concern/injury: Beryl Holm comes in today for follow up for her concussion. She says she still feels \"off\". She comes in today wearing a hat, saying being outside increased symptoms due to the sun in conjunction with the snow outside making it extra bright. She says she completed some assignments last night but she does not feel like she did well on them. She had trouble concentrating and putting her thoughts together coherently. Screens make symptoms worse. She reports her vision is slightly blurry. She has poor vision in one eye from previous eye injury when she was younger.     Symptom inventory completed. She reports 19/22 symptoms and a severity score of 57/132. She says it was difficult to fall asleep, but she was able to stay asleep once she fell asleep.     Headache, don't feel right = 5  Pressure in head, neck pain, sensitivity to light, fatigue, drowsiness = 4  Blurred vision, feeling slowed down, feeling in a fog, difficulty concentrating, difficulty remembering = 3  Dizziness, sensitivity to noise, confusion, more emotional, irritability = 2  Sadness, trouble falling asleep = 1    Treatment:  - Heat neck  - Massage and traction/stretching of neck    Plan: Continue resting until symptoms decrease. [facility] concussion management plan is being followed.     Kenya Meredith, ATC      "

## 2022-03-09 NOTE — PROGRESS NOTES
"[facility] ATHLETICS  Eliel ATC initial assessment note  Date of service performed: 3/5/2022    Concern: Concussion  Type: Athletics related  Date of injury: 3/5/2022    S: Alta was playing in playoff game vs. [employer] when she hit her head on her own goalie's pad and then whiplashed back, hitting the back of her head on the base of the net. She was on the ice for a few moments but was able to get up on her own and skate to bench before AT arrived for assistance. She was immediately evaluated by myself and Dr. Najera. Dr. Najera's note is in Epic.     Initially, Alta reported to me that she \"didn't know what happened\" and that she had a headache in the back of her head (where she hit the net), and felt \"dazed\". When Dr. Najera arrived for evaluation, Alta said she was feeling better. Her concussion examination was normal, but we did not allow her to return to play. Alta did sit on the bench with the team for the rest of the game, but did not play. Symptoms were monitored during the game. She reported no symptoms to us.    A: Possible concussion    P: Alta did not return to play. She and her roommate were given instructions on symptoms to watch for and when to go to ER. She will check in with me tomorrow in the morning and with Dr. Palomo before the game.     Kenya Meredith, ATC          "

## 2022-03-09 NOTE — PROGRESS NOTES
[facility] ATHLETICS  Eliel ATC follow-up note  Date of service performed: 3/9/2022    Concern/injury: Concussion    Alta comes in today for check in for her concussion. Symptom inventory performed. 16 symptoms, severity score of 46.    Fatigue, drowsiness = 5  Neck pain, feeling slowed down, don't feel right = 4  Pressure in head, blurred vision, sensitivity to noise, feeling in a fog, difficulty concentrating = 3  Headache, dizziness, sensitivity to light = 2  Balance problems, difficulty remembering, nervous/anxious = 1    She continues to have neck pain, which we worked on today. She reports her hips have been more painful since not doing anything these past couple of days. Allowed her to get on a bike with zero resistance and low intensity for 10 mins. This did not increase symptoms. She tried to do some standing and lying hip rehab exercises. When she tried to do lateral band walks, her symptoms increased, so we discontinued the rehab at that time. Symptoms improved when she sat down and rested.     Treatment: cupping and graston of cervical musculature and upper trap. Traction/stretching/massage neck.     She did not stay to watch practice. She did go with team to [employer] for team outing. She was instructed not to golf while there, but could sit with teammates.     [facility] concussion management plan is being followed.    Kenya Meredith, ATC

## 2022-03-09 NOTE — PROGRESS NOTES
[facility] ATHLETICS  Eliel ATC follow-up note  Date of service performed: 3/8/2022    Concern/injury: Concussion    I did not see Alta today in person. She says symptoms are still about the same. She reports the base of her skull is tight/sore. She is getting a massage today with team massages.    Kenya Meredith, ATC

## 2022-03-09 NOTE — PROGRESS NOTES
"[facility] ATHLETICS  [facility] ATC follow-up note  Date of service performed: 3/6/2022    Concern/injury: Concussion    Subjective: Alta comes in this morning saying she \"knows she has a concussion\". She says symptoms began last night after the game when she went to the baseball game. She said she felt \"out of it\" and that an inning would go by and she wouldn't remember any of the plays.     Objective: SCAT5 performed.    Symptoms: 17/22 (feeling slowed down, feeling in a fog, don't feel right, fatigue = 5)  Symptom severity: 52/132  Orietation: 5/5  Immediate memory: 15/15  Concentration: 4/5  Neuro screening: WNL  Balance: 3 errors  Delayed recall: 2/5    VOMS:   Smooth pursuits: WNL  Horiztonal saccades: increased symptoms  Vertical saccades: WNL  Horiztonal VOR: increased symptoms  Vertical VOR: WNL  VMS: increased symptoms    Assessment/plan: Concussion.    Discussed with Dr. Palomo and Dr. Najera. Placed at RTL level 3. Spring break this coming week so no classes. Does have a couple of assignments she needs to work on.    Not cleared for any physical activity, including the game today. She did stay and watch, but had to go into lounge after 1st period due to symptoms increasing while being in the crowd.    [facility] concussion management plan is being followed.    Kenya Meredith, ATC      "

## 2022-03-12 ENCOUNTER — OFFICE VISIT (OUTPATIENT)
Dept: ORTHOPEDICS | Facility: CLINIC | Age: 22
End: 2022-03-12
Payer: COMMERCIAL

## 2022-03-12 DIAGNOSIS — S06.0X0D CONCUSSION WITHOUT LOSS OF CONSCIOUSNESS, SUBSEQUENT ENCOUNTER: Primary | ICD-10-CM

## 2022-03-12 NOTE — Clinical Note
Hey! I tried to co-sign the chart to you and it just signed the whole encounter but you should be able to create an addendum and just write throw in a note that you were there and agree with the plan.

## 2022-03-12 NOTE — LETTER
3/12/2022      RE: Manda Lincoln  [address]  [place] [address]       [facility]   Game Day Visit           SUBJECTIVE:     Manda Lincoln is a 21 year old female with a presenting to for follow up concussion.     Initial Injury: 3/5/22, no LOC, symptoms significantly worse on 3/6/22    She feels like she has been doing much better. She will still notice some blurred vision and difficulty concentrating on her vision when she is using her computer for too long. She did do some school work early in the week which was a little bit challenging, she hasn't had any other work to do since it is spring break. Her headache is improved. She is still sleeping quite a bit. For example last night she slept 9.5 hours and also took a nap. She hasn't been doing to practice or watching other games.      Total number of symptoms 12  Symptom severity 17      PMH, Medications and Allergies were reviewed and updated as needed.    ROS:  As noted above otherwise negative.    There is no problem list on file for this patient.      Current Outpatient Medications   Medication Sig Dispense Refill     escitalopram (LEXAPRO) 10 MG tablet Take 1 tablet (10 mg) by mouth daily 90 tablet 3     escitalopram (LEXAPRO) 20 MG tablet Take 1 tablet (20 mg) by mouth daily 90 tablet 0     escitalopram (LEXAPRO) 20 MG tablet Take 1 tablet (20 mg) by mouth daily 30 tablet 3     vitamin D3 (CHOLECALCIFEROL) 1.25 MG (54938 UT) capsule Take 1 capsule (50,000 Units) by mouth every 7 days 12 capsule 1              OBJECTIVE:     Gen:  Well nourished and in no acute distress  HEENT: Extraocular movement intact.  Neck: supple  Skin: No rash, erythema, ecchymosis or obvious abnormality  Psych: Euthymic   Neuro: Alert and oriented. EOMI. No difficulty with tracking. No nystagmus. VOMS normal with out any symptoms. Some difficulty with near point convergence, she never sees double but her left eye will have to refocus as she  tracks towards her face. Zero balance errors on double leg and tandem balance. 1 balance error on single leg.               ASSESSMENT & PLAN:      Manda was seen today for head injury.    Diagnoses and all orders for this visit:    Concussion without loss of consciousness, subsequent encounter: Concussion sustained on 3/5/22, symptoms have continued to improve daily and are significantly lower than they were at their peak. Alta at baseline has reduced visual acuity and peripheral vision of her left eye and she continues to have some visual symptoms with her concussion but these have also significantly improved over a relatively short period of time.   - continue to monitor symptoms  - plan to start return to play protocol when symptoms have resolved  - okay for light biking and physical therapy as long as not worsening symptoms  - continue to monitor visual symptoms       Options for treatment and/or follow-up care were reviewed with the patient and , Kenya Meredith ATC. Patient was actively involved in the decision making process. Patient verbalized understanding and was in agreement with the plan.    The patient was seen by and discussed with Dr. Karina Najera MD, CAQ    Sol Palomo MD  Primary Care Sports Medicine Fellow        Karina Najera MD

## 2022-03-12 NOTE — PROGRESS NOTES
[facility]   Game Day Visit           SUBJECTIVE:     Manda Lincoln is a 21 year old female with a presenting to for follow up concussion.     Initial Injury: 3/5/22, no LOC, symptoms significantly worse on 3/6/22    She feels like she has been doing much better. She will still notice some blurred vision and difficulty concentrating on her vision when she is using her computer for too long. She did do some school work early in the week which was a little bit challenging, she hasn't had any other work to do since it is spring break. Her headache is improved. She is still sleeping quite a bit. For example last night she slept 9.5 hours and also took a nap. She hasn't been doing to practice or watching other games.      Total number of symptoms 12  Symptom severity 17      PMH, Medications and Allergies were reviewed and updated as needed.    ROS:  As noted above otherwise negative.    There is no problem list on file for this patient.      Current Outpatient Medications   Medication Sig Dispense Refill     escitalopram (LEXAPRO) 10 MG tablet Take 1 tablet (10 mg) by mouth daily 90 tablet 3     escitalopram (LEXAPRO) 20 MG tablet Take 1 tablet (20 mg) by mouth daily 90 tablet 0     escitalopram (LEXAPRO) 20 MG tablet Take 1 tablet (20 mg) by mouth daily 30 tablet 3     vitamin D3 (CHOLECALCIFEROL) 1.25 MG (66205 UT) capsule Take 1 capsule (50,000 Units) by mouth every 7 days 12 capsule 1              OBJECTIVE:     Gen:  Well nourished and in no acute distress  HEENT: Extraocular movement intact.  Neck: supple  Skin: No rash, erythema, ecchymosis or obvious abnormality  Psych: Euthymic   Neuro: Alert and oriented. EOMI. No difficulty with tracking. No nystagmus. VOMS normal with out any symptoms. Some difficulty with near point convergence, she never sees double but her left eye will have to refocus as she tracks towards her face. Zero balance errors on double leg and tandem balance. 1 balance error  on single leg.               ASSESSMENT & PLAN:      Manda was seen today for head injury.    Diagnoses and all orders for this visit:    Concussion without loss of consciousness, subsequent encounter: Concussion sustained on 3/5/22, symptoms have continued to improve daily and are significantly lower than they were at their peak. Alta at baseline has reduced visual acuity and peripheral vision of her left eye and she continues to have some visual symptoms with her concussion but these have also significantly improved over a relatively short period of time.   - continue to monitor symptoms  - plan to start return to play protocol when symptoms have resolved  - okay for light biking and physical therapy as long as not worsening symptoms  - continue to monitor visual symptoms       Options for treatment and/or follow-up care were reviewed with the patient and , Kenya Meredith ATC. Patient was actively involved in the decision making process. Patient verbalized understanding and was in agreement with the plan.    The patient was seen by and discussed with Dr. Karina Najera MD, CAMORE Palomo MD  Primary Care Sports Medicine Fellow      I was present with the fellow during the history and exam.  I discussed the case with the fellow and agree with the findings as documented in the assessment and plan.   Karina Najera MD

## 2022-03-12 NOTE — LETTER
3/12/2022      RE: Manda Lincoln  [address]  [place] [facility] [address]       [facility]   Game Day Visit           SUBJECTIVE:     Manda Lincoln is a 21 year old female with a presenting to for follow up concussion.     Initial Injury: 3/5/22, no LOC, symptoms significantly worse on 3/6/22    She feels like she has been doing much better. She will still notice some blurred vision and difficulty concentrating on her vision when she is using her computer for too long. She did do some school work early in the week which was a little bit challenging, she hasn't had any other work to do since it is spring break. Her headache is improved. She is still sleeping quite a bit. For example last night she slept 9.5 hours and also took a nap. She hasn't been doing to practice or watching other games.      Total number of symptoms 12  Symptom severity 17      PMH, Medications and Allergies were reviewed and updated as needed.    ROS:  As noted above otherwise negative.    There is no problem list on file for this patient.      Current Outpatient Medications   Medication Sig Dispense Refill     escitalopram (LEXAPRO) 10 MG tablet Take 1 tablet (10 mg) by mouth daily 90 tablet 3     escitalopram (LEXAPRO) 20 MG tablet Take 1 tablet (20 mg) by mouth daily 90 tablet 0     escitalopram (LEXAPRO) 20 MG tablet Take 1 tablet (20 mg) by mouth daily 30 tablet 3     vitamin D3 (CHOLECALCIFEROL) 1.25 MG (97565 UT) capsule Take 1 capsule (50,000 Units) by mouth every 7 days 12 capsule 1              OBJECTIVE:     Gen:  Well nourished and in no acute distress  HEENT: Extraocular movement intact.  Neck: supple  Skin: No rash, erythema, ecchymosis or obvious abnormality  Psych: Euthymic   Neuro: Alert and oriented. EOMI. No difficulty with tracking. No nystagmus. VOMS normal with out any symptoms. Some difficulty with near point convergence, she never sees double but her left eye will have to refocus as she tracks  towards her face. Zero balance errors on double leg and tandem balance. 1 balance error on single leg.          ASSESSMENT & PLAN:      Manda was seen today for head injury.    Diagnoses and all orders for this visit:    Concussion without loss of consciousness, subsequent encounter: Concussion sustained on 3/5/22, symptoms have continued to improve daily and are significantly lower than they were at their peak. Alta at baseline has reduced visual acuity and peripheral vision of her left eye and she continues to have some visual symptoms with her concussion but these have also significantly improved over a relatively short period of time.   - continue to monitor symptoms  - plan to start return to play protocol when symptoms have resolved  - okay for light biking and physical therapy as long as not worsening symptoms  - continue to monitor visual symptoms       Options for treatment and/or follow-up care were reviewed with the patient and , Kenya Meredith ATC. Patient was actively involved in the decision making process. Patient verbalized understanding and was in agreement with the plan.    The patient was seen by and discussed with Dr. Karina Najera MD, CAQ    Sol Palomo MD  Primary Care Sports Medicine Fellow        Karina Najera MD

## 2022-03-16 NOTE — PROGRESS NOTES
[facility] ATHLETICS  [facility] ATC follow-up note  Date of service performed: 3/10/2022    Concern/injury: Concussion    Alta comes in today for a check in. She feels her symptoms are improving a little.     Symptom inventory:  14/22 symptoms  35/132 severity score  3 = headache, pressure in head, sensitivity to light, feeling slowed down, feeling in a fog, don't feel right, fatigue, drowsiness  2 = dizziness, blurred vision, sensitivity to noise  1 = neck pain, balance problems    Continues to have symptoms with horizontal eye movements.     Assessment/plan: Symptoms are improving, down from yesterday's 16 symptoms and severity score of 46. She did stay to watch practice today. Remains at RTL level 3, as [place] is currently on spring break. [facility] concussion management plan is being followed.     Kenya Meredith, ATC

## 2022-03-16 NOTE — PROGRESS NOTES
[facility] ATHLETICS  [facility] ATC follow-up note  Date of service performed: 3/12/2022    Concern/injury: Beryl Holm comes in for the game today and to follow up with Dr. Najera/Dr. Palomo. I did not see her yesterday, but she did go for a walk and said symptoms did not worsen with the walk.    Symptoms have improved significantly over the past couple of days. Currently has 12 symptoms with a severity score of 27/132. This is down from two days ago when she had 14 symptoms, severity score of 35.    2 = pressure in head, blurred vision, don't feel right, difficulty concentrating, drowsiness  1 = headache, neck pain, sensitivity to light, sensitivity to noise, feeling slowed down, feeling in a fog, fatigue    She got 9.5 hours of sleep last night, which is more than normal.    VOMS testing is improved and WNL.     Assessment/plan: She saw Dr. Palomo and Dr. Najera today for follow up. She may start RTP protocol when symptoms have resolved. She may do light biking and/or rehab for her hips as long as it does not worsen symptoms.     Currently, she has only done very light biking, keeping HR under 120 and rehab exercises. She is anxious to start skating again. She remains at a RTL level 3, which the [place] is on spring break. Will adjust next week once she has been in class and can determine if symptoms worsen. [facility] concussion management plan is being followed.    Kenya Meredith, ATC

## 2022-03-22 NOTE — PROGRESS NOTES
[facility] ATHLETICS  [facility] ATC follow-up note  Date of service performed: 3/21/2022    Concern/injury: Concussion    Alta comes in today with no concussion symptoms. She completed RTP level 2 - sport specific activity by skating. She did board to board skating 6x (12 reps total), as well as some puck handling and light shooting. She reports no symptoms during or after. She did do some light hip rehab after on ice as well.    Assessment/plan: Continue to progress on ice activity, as well as do some off ice activity to get HR higher. She will complete ImPACT test and see Dr. Najera or Dr. Palomo next week. [facility] concussion management plan is being followed.    Kenya Meredith, ATC

## 2022-03-22 NOTE — PROGRESS NOTES
[facility] ATHLETICS  [facility] ATC follow-up note  Date of service performed: 3/17/2022    Concern/injury: Concussion    Alta reports no concussion symptoms. She has done light rehab the past couple of days but nothing strenuous. She is going out of town tomorrow and through the weekend. We will begin on ice RTP progression next week. [facility] concussion management plan is being followed.      Kenya Meredith, ATC

## 2022-03-22 NOTE — PROGRESS NOTES
[facility] ATHLETICS  [facility] ATC follow-up note  Date of service performed: 3/15/2022    Concern/injury: Concussion    Alta comes in today stating her symptoms are much improved.She had no issues with her classes yesterday.     Symptoms have improved significantly. She had a total of 6 symptoms and a severity score of 6. Symptoms (score of 1 each): neck pain, dizziness, blurred vision, feeling slowed down, fatigue, drowsiness. She had 7.5 hours of sleep last night, which is normal for her.    She did more intense rehab, utilizing BFR and dowing step ups, single leg squat to seat and squats on bosu ball. Her HR got up to 166. She noticed no increase in symptoms.     Assessment/plan: Will plan on having her skate on her own and progress through RTP next week when [place] ice is available. She will likely do a modified return to hockey progression, as the season is over and there is not option for her to do full contact practices. We will get her HR up that high, though, and make sure she doesn't have symptoms.     RTL level 5 emailed out.    [facility] concussion management plan is being followed.    eKnya Meredith, ATC

## 2022-03-23 NOTE — PROGRESS NOTES
"[facility] ATHLETICS  [facility] ATC follow-up note  Date of service performed: 3/23/2022    Concern/injury: Concussion    Alta did 25 mins bike with BFR today, getting HR between 140-170bpm. She had no symptoms.    She did complain of \"dehydration symptoms\". When asked for further explanation, she said she sometimes feels lightheaded. She is unsure if this started before or after she suffered her concussion. She feels that she drinks enough water throughout the day.     Assessment/plan: Will continue to monitor the lightheaded feeling. It did not increase during or after biking. Will continue to progress physical activity and monitor symptoms. Will follow up with Dr. Najera next week.    Kenya Meredith, ATC      "

## 2022-03-24 ENCOUNTER — DOCUMENTATION ONLY (OUTPATIENT)
Dept: FAMILY MEDICINE | Facility: CLINIC | Age: 22
End: 2022-03-24
Payer: COMMERCIAL

## 2022-03-24 NOTE — PROGRESS NOTES
"[facility] ATHLETIC MEDICINE  [facility]   Sport Psychology Progress Note      Location of Visit: [facility]   Date of Visit: 3/24/22  Duration of Session: 50 minutes  Referred by:     Self-reported Concerns/Symptoms:  Anxiety/worry  Transition/adjustment  Other: Relationship break-up     \"I don't handle the unknown very well.\"     Suicide and Risk Assessment:  Recent suicidal thoughts: No  Past suicidal thoughts: No  Recent homicidal thoughts: No  Any attempts in the past: No  Any family/friends/loved ones die by suicide: No  Plan or considering various methods: No  Access to guns: No  Protective factors: no h/o suicide attempt, no plan or intent, no h/o risky impulsive behavior, no access to lethal means, h/o seeking help when needed, future oriented, feeling hopeful, commitment to family, good social support   and stable housing    Alta denies current urges to self-harm, homicidal ideation, suicidal ideation, means, plans, or intent.    Mental Status & Observations:  Alta appeared generally alert and oriented. Dress was appropriate to the weather and occasion. Grooming and hygiene were appropriate. Eye contact was good. Speech was of normal volume and normal. Thought processes were relevant, logical and goal-directed. Thought content was within normal limits with no evidence of psychotic or paranoid features. Memory appeared intact. She exhibited normal motor activity during the appointment. Mood was appropriate with congruent affect. Insight and judgment appeared age appropriate with good focus in session.  Behavior was candid and open    Observations & Response:   Alta reports that she is taking 30mg Lexapro () and is feeling good with her symptoms of anxiety. She reports that she struggled with significant hip pain this year in hockey and will be undergoing 2 surgeries in April and May . She reports that she is dating her " "boyfriend, Reese, who is also an athlete. She reports wanting to work on being less irritable \"if plans doing' go the way I want\" and more cognitively flexible/adapatable to letting go of uncontrollables. 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 She reports high level os support from her ATC and parents and new coaches.     Historically, she has noticed anxiety, low mood,  irritability, fatigue, indecisiveness, self-criticalness, loss of enjoyment/pleasure, sadness, loss of appetite and feeling down. She reports feeling agitated and restless often, and at at times feels so overwhelmed that she \" legs hard to where they bruise.\" She reports feeling as though she worries most days and feels it's negatively impacting her relationships. She experiences food allergies (e.g., eggs, dairy, chicken, gluten) and some anxiety particularly with social interactions. Known history of mental health in self: Yes, No previous mental health treatment but describes previous symptoms or experiences including tearfulness/crying, feeling low, panic attacks, anxiety about \"lots of things\" including performance/tests, and self-worth concerns. Ct also reports self-criticalness. CT reports that she notices crying multiple times x week and panic attacks 1 x per week where she feels chest tightness, difficulties breathing and \"can't control my thoughts.\" (Fall 2020) Ct also reports impatience with experiences at times where \"if I get an idea, I want to do it right away or I get discouraged.\"    Known history of mental health in family member(s): Yes: Brother was dx with depression and participated in counseling; ADHD; Grandfather- depression    Intervention:   Processed her decision to undergo surgeries this spring. Discussed hindsight bias, showing herself judy/compassion and measuring herself less on outcome and more on her adaptability to uncontrollables/unknowns. Shared human expectation research to validate the difficulties at times " "in being flexible in our behavioral responses. Validated her in her experiences in her injuries/pain and for listening to/honroing her instincts in her decision-making.     Coping skills, strengths and supports:   Communication skills, Exercise, Family support, Insight and sensitivity, Maturity and judgment, Motivation for change, Jainism/spirituality , Socioeconomic stability, Social support system and Use of available services    Goals for counseling:  Ct reports that she does not \"handle the unknown very well\" and would like to get better at coping with uncertainty.1) \"Learn to embrace the present more and not worry about the future as much.\" 2) \"Learn ways to cope when I'm feeling anxious/panicky.\"    Clinical impressions or Other:  300.02 (F41.4) Generalized Anxiety Disorder  F.33 Major Depressive Disorder, recurrent     Therapy objectives/goals:  Assist with transition into college  Build resilience and response to adversity  Decrease anxiety symptoms  Decrease depressive symptoms  Decrease perceived stress  Enhance self-advocacy  Enhance self-care  Increase assertiveness  Increase mindfulness and the ability to be present  Increase self-awareness  Increase self-esteem and self-worth  Increase willingness to be vulnerable and experience emotions  Improve mood  Provide support  Teach and improve coping skills    Therapy follow-up plan:  Individual counseling sessions weekly  Follow up with sports medicine physician  Participation with [employer] online mental health resource  May recommend medication consultation if warranted in the future.     Jag Ross, PhD LP, CMPC  "

## 2022-03-29 NOTE — PROGRESS NOTES
[facility] ATHLETICS  [facility] ATC follow-up note  Date of service performed: 3/28/2022    Concern/injury: Bilateral hip pain    Alta reports her hips are in a lot of pain after rollerblading this weekend. She continues to do daily hip rehab. Surgeries scheduled for April 15 (right) and May 17 (left).      Kenya Meredith, ATC

## 2022-03-29 NOTE — PROGRESS NOTES
"[facility] ATHLETICS  [facility] ATC follow-up note  Date of service performed: 3/28/2022    Concern/injury: Concussion    Assessment/plan: Alta will see Dr. Najera next Wednesday for final clearance since Dr. Najera is virtual appointments only.    Due to being out of season, she is unable to do \"contact\" practice, but she has gotten her HR into all RTP levels and remains asymptomatic.     Kenya Meredith, ATC      "

## 2022-03-29 NOTE — PROGRESS NOTES
[facility] ATHLETICS  [facility] ATC follow-up note  Date of service performed: 3/12/2022    Concern/injury: Bilateral hip pain    Alta sustained a concussion during a game on 3/5/2022 and stayed out of hockey through the end of the season. She continues to have pain in bilateral hips. We did reach out to Dr. Freed's team to see if surgeries could be moved up, but they did not have any openings. They did put her on the waitlist.     Kenya Mereidth, ATC

## 2022-03-29 NOTE — PROGRESS NOTES
[facility] ATHLETICS  [facility] ATC follow-up note  Date of service performed: 3/24/2022    Concern/injury: Concussion    Alta comes in today for rehab for her hips. She has no concussion symptoms. She still needs to do her impact test, but otherwise is fully recovered from concussion.    Assessment/plan: Needs to complete impact. Will f/u with Dr. Najera for final clearance.    Kenya Meredith, ATC

## 2022-04-05 ENCOUNTER — OFFICE VISIT (OUTPATIENT)
Dept: FAMILY MEDICINE | Facility: CLINIC | Age: 22
End: 2022-04-05
Payer: COMMERCIAL

## 2022-04-05 VITALS
BODY MASS INDEX: 26.42 KG/M2 | HEIGHT: 69 IN | SYSTOLIC BLOOD PRESSURE: 118 MMHG | DIASTOLIC BLOOD PRESSURE: 74 MMHG | WEIGHT: 178.4 LBS | HEART RATE: 96 BPM

## 2022-04-05 VITALS
WEIGHT: 178 LBS | HEIGHT: 69 IN | HEART RATE: 96 BPM | BODY MASS INDEX: 26.36 KG/M2 | DIASTOLIC BLOOD PRESSURE: 74 MMHG | SYSTOLIC BLOOD PRESSURE: 118 MMHG

## 2022-04-05 DIAGNOSIS — S39.81XD SPORTS HERNIA, SUBSEQUENT ENCOUNTER: Primary | ICD-10-CM

## 2022-04-05 DIAGNOSIS — S06.0X0D CONCUSSION WITHOUT LOSS OF CONSCIOUSNESS, SUBSEQUENT ENCOUNTER: Primary | ICD-10-CM

## 2022-04-05 DIAGNOSIS — Z01.818 PRE-OP EXAMINATION: ICD-10-CM

## 2022-04-05 DIAGNOSIS — E55.9 VITAMIN D DEFICIENCY: ICD-10-CM

## 2022-04-05 NOTE — PROGRESS NOTES
NAME: Manda Lincoln      AGE: 22 year old      SEX: female  Chief Complaint/Reason for Procedure:  RIGHT BABATUNDE and labral repair  Indications:  Surgeon:  Dr. Valdez Freed Date of Surgery: Thursday, April 7 Location: [facility]    HPI:  female  with B hip pain since Sept 2021 with BABATUNDE and labral pathology.  Meds, rehab and activity limitation helpful temporarily but pain returns. Pain with hockey and ADLs.    PAST HISTORY  R arm surgery due to fracture as a child  T & A  [place] teeth extraction    Past Medical History:   Diagnosis Date     Uncomplicated asthma      H/o asthma as a child.  Last time she had an inhaler was about 8 years ago.  No SOB or MULLIGAN, no prolonged coughs with colds.      MEDICATIONS:  Note:  Lexapro 30 mg per day (20 mg pill +10 mg pill)    Current Outpatient Medications   Medication     escitalopram (LEXAPRO) 20 MG tablet     escitalopram (LEXAPRO) 10 MG tablet     No current facility-administered medications for this visit.         ALLERGIES  Amoxicillin and Penicillin g  Hives    SOCIAL HISTORY  Social History     Tobacco Use     Smoking status: Never Smoker     Smokeless tobacco: Never Used   Substance Use Topics     Alcohol use: Never     Drug use: Never       FAMILY HISTORY  No family history on file.    Family History of Anesthesia Reaction: No  Family History of Bleeding Disorder:  No    REVIEW OF SYSTEMS  CONSTITUTIONAL: NEGATIVE for fever, chills, change in weight  INTEGUMENTARY/SKIN: NEGATIVE for worrisome rashes, moles or lesions  EYES: NEGATIVE for vision changes or irritation  ENT/MOUTH: NEGATIVE for ear, mouth and throat problems  RESP: NEGATIVE for significant cough or SOB  CV: NEGATIVE for chest pain, palpitations or peripheral edema  GI: NEGATIVE for nausea, abdominal pain, heartburn, or change in bowel habits  : NEGATIVE for frequency, dysuria, or hematuria  MUSCULOSKELETAL: NEGATIVE for significant arthralgias or myalgia  NEURO: NEGATIVE for weakness,  "dizziness or paresthesias  ENDOCRINE: NEGATIVE for temperature intolerance, skin/hair changes  HEME: NEGATIVE for bleeding problems  PSYCHIATRIC: NEGATIVE for changes in mood or affect    PHYSICAL EXAM  /74   Pulse 96   Ht 1.753 m (5' 9\")   Wt 80.9 kg (178 lb 6.4 oz)   BMI 26.35 kg/m    General Appearance: Normal  Skin:  Normal  Head:  Normal  Eyes: Normal  Ears: Normal  Nose: Normal  Mouth/Teeth: Normal  Throat: Normal  Neck: Normal  Breasts: Normal  Chest/Lungs: Normal  Heart/Vascular: Normal  Abdomen: Normal  Skeletal: Normal except B hips due BABATUNDE and labral pathology  Lymphoid: Normal  Blood Vessels: Normal  Neuromuscular: Normal  Other: Normal    LAB/RADIOLOGY RESULTS  NA    Impression:  Other comments:  Healthy female with a h/o of mild asthma without symptoms for 8 years with B BABATUNDE and labral pathology. Plan for R hip surgery first and Left hip to follow in May 2022.     Vit D deficiency:  Finished 12 wks prescription without dificulty.  Recheck Vit D at her convenience.  Order placed.       PLAN  This patient has been examined by me this day and has been found to be an acceptable candidate for surgery with apppropriate anesthesia.    The patient was seen and discussed with Dr. Lacey Coon.    Sol Palomo MD   4/5/2022        "

## 2022-04-05 NOTE — PROGRESS NOTES
Attending Note:   I have examined this patient and have reviewed the clinical presentation and progress note with the fellow. I agree with the treatment plan as outlined. The plan was formulated with the fellow on the day of the patient's visit.   Lacey Coon MD, CAQ, CCD  [facility]  Sports Medicine and Bone Health

## 2022-04-05 NOTE — LETTER
4/5/2022      RE: Manda Lincoln  [address]  [place] [address]       NAME: Manda Lincoln      AGE: 22 year old      SEX: female  Chief Complaint/Reason for Procedure:  RIGHT BABATUNDE and labral repair  Indications:  Surgeon:  Dr. Valdez Freed Date of Surgery: Thursday, April 7 Location: [facility]    HPI: [facility] female  with B hip pain since Sept 2021 with BABATUNDE and labral pathology.  Meds, rehab and activity limitation helpful temporarily but pain returns. Pain with hockey and ADLs.    PAST HISTORY  R arm surgery due to fracture as a child  T & A  [place] teeth extraction    Past Medical History:   Diagnosis Date     Uncomplicated asthma      H/o asthma as a child.  Last time she had an inhaler was about 8 years ago.  No SOB or MULLIGAN, no prolonged coughs with colds.      MEDICATIONS:  Note:  Lexapro 30 mg per day (20 mg pill +10 mg pill)    Current Outpatient Medications   Medication     escitalopram (LEXAPRO) 20 MG tablet     escitalopram (LEXAPRO) 10 MG tablet     No current facility-administered medications for this visit.         ALLERGIES  Amoxicillin and Penicillin g  Hives    SOCIAL HISTORY  Social History     Tobacco Use     Smoking status: Never Smoker     Smokeless tobacco: Never Used   Substance Use Topics     Alcohol use: Never     Drug use: Never       FAMILY HISTORY  No family history on file.    Family History of Anesthesia Reaction: No  Family History of Bleeding Disorder:  No    REVIEW OF SYSTEMS  CONSTITUTIONAL: NEGATIVE for fever, chills, change in weight  INTEGUMENTARY/SKIN: NEGATIVE for worrisome rashes, moles or lesions  EYES: NEGATIVE for vision changes or irritation  ENT/MOUTH: NEGATIVE for ear, mouth and throat problems  RESP: NEGATIVE for significant cough or SOB  CV: NEGATIVE for chest pain, palpitations or peripheral edema  GI: NEGATIVE for nausea, abdominal pain, heartburn, or change in bowel habits  : NEGATIVE for frequency, dysuria, or  "hematuria  MUSCULOSKELETAL: NEGATIVE for significant arthralgias or myalgia  NEURO: NEGATIVE for weakness, dizziness or paresthesias  ENDOCRINE: NEGATIVE for temperature intolerance, skin/hair changes  HEME: NEGATIVE for bleeding problems  PSYCHIATRIC: NEGATIVE for changes in mood or affect    PHYSICAL EXAM  /74   Pulse 96   Ht 1.753 m (5' 9\")   Wt 80.9 kg (178 lb 6.4 oz)   BMI 26.35 kg/m    General Appearance: Normal  Skin:  Normal  Head:  Normal  Eyes: Normal  Ears: Normal  Nose: Normal  Mouth/Teeth: Normal  Throat: Normal  Neck: Normal  Breasts: Normal  Chest/Lungs: Normal  Heart/Vascular: Normal  Abdomen: Normal  Skeletal: Normal except B hips due BABATUNDE and labral pathology  Lymphoid: Normal  Blood Vessels: Normal  Neuromuscular: Normal  Other: Normal    LAB/RADIOLOGY RESULTS  NA    Impression:  Other comments:  Healthy female with a h/o of mild asthma without symptoms for 8 years with B BABATUNDE and labral pathology. Plan for R hip surgery first and Left hip to follow in May 2022.     Vit D deficiency:  Finished 12 wks prescription without dificulty.  Recheck Vit D at her convenience.  Order placed.       PLAN  This patient has been examined by me this day and has been found to be an acceptable candidate for surgery with apppropriate anesthesia.    The patient was seen and discussed with Dr. Lacey Coon.    Sol Palomo MD   4/5/2022          Attending Note:   I have examined this patient and have reviewed the clinical presentation and progress note with the fellow. I agree with the treatment plan as outlined. The plan was formulated with the fellow on the day of the patient's visit.   Lacey Coon MD, CAQ, CCD  [facility]  Sports Medicine and Bone Health    "

## 2022-04-05 NOTE — PROGRESS NOTES
"S:  Alta is a  female  here for concussion clearance.    DOI:  3/5/2022    Doing well and feels recovered.  Notes her neck might be slightly tighter than before the concussion. 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  Having hip BABATUNDE and labral surgery on the RIGHT.  No problems with school, screens, sleeping.  Felt like it was harder to focus for a while but now feels like that has returned to her baseline.         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  Decreased visual acuity and no peripheral vision on that side.        O:  NAD  /74   Pulse 96   Ht 1.753 m (5' 9\")   Wt 80.7 kg (178 lb)   BMI 26.29 kg/m        VOMS normal with out any symptoms. Unable to maintain convergence on the LEFT without symptoms.      0 balance error on double leg and 1 balance error tandem balance. 0 balance error on single leg.     ImPACT reviewed:  All composites at or above baseline    A: Concussion: resolved    P:  Ok to RTP as per her hip surgery rehab and progress.     Sol Palomo MD , Sports Medicine Fellow was present for the entire appt and examined the patient.     Kenya Meredith ATC, was present for the entire appt.       Lacey Coon MD, CAQ, FACSM, CCD  [facility]  Sports Medicine and Bone Health  Team Physician;  Athletics                "

## 2022-04-05 NOTE — LETTER
"  4/5/2022      RE: Manda Lincoln  [address]  [place] [address]       S:  Alta is a  female  here for concussion clearance.    DOI:  3/5/2022    Doing well and feels recovered.  Notes her neck might be slightly tighter than before the concussion. 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  Having hip BABATUNDE and labral surgery on the RIGHT.  No problems with school, screens, sleeping.  Felt like it was harder to focus for a while but now feels like that has returned to her baseline.         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  Decreased visual acuity and no peripheral vision on that side.        O:  NAD  /74   Pulse 96   Ht 1.753 m (5' 9\")   Wt 80.7 kg (178 lb)   BMI 26.29 kg/m        VOMS normal with out any symptoms. Unable to maintain convergence on the LEFT without symptoms.      0 balance error on double leg and 1 balance error tandem balance. 0 balance error on single leg.     ImPACT reviewed:  All composites at or above baseline    A: Concussion: resolved    P:  Ok to RTP as per her hip surgery rehab and progress.     Sol Palomo MD , Sports Medicine Fellow was present for the entire appt and examined the patient.     Kenya Meredith ATC, was present for the entire appt.       Lacey Coon MD, CAQ, FACSM, CCD  [facility]  Sports Medicine and Bone Health  Team Physician;  Athletics        "

## 2022-04-07 ENCOUNTER — DOCUMENTATION ONLY (OUTPATIENT)
Dept: FAMILY MEDICINE | Facility: CLINIC | Age: 22
End: 2022-04-07

## 2022-05-16 ENCOUNTER — OFFICE VISIT (OUTPATIENT)
Dept: FAMILY MEDICINE | Facility: CLINIC | Age: 22
End: 2022-05-16
Payer: COMMERCIAL

## 2022-05-16 VITALS
BODY MASS INDEX: 25.48 KG/M2 | DIASTOLIC BLOOD PRESSURE: 75 MMHG | SYSTOLIC BLOOD PRESSURE: 126 MMHG | WEIGHT: 172 LBS | HEIGHT: 69 IN | HEART RATE: 103 BPM

## 2022-05-16 DIAGNOSIS — Z01.818 PRE-OP EXAMINATION: Primary | ICD-10-CM

## 2022-05-16 NOTE — LETTER
5/16/2022      RE: Manda Lincoln  [address]  [place] [address]     Dear Colleague,    Thank you for referring your patient, Manda Lincoln, to the [facility]. Please see a copy of my visit note below.    NAME: Manda Lincoln      AGE: 22 year old      SEX: female  Chief Complaint/Reason for Procedure: LEFT hip arthroscopy and labral repair and BABATUNDE Indications:  Pain and failed non-surgical measures  Surgeon:  Dr. Freed Date of Surgery: May 17, 2022 Location: [facility]  HPI: Longstanding anterior hip pain with hockey and ADLs despite rehab, meds, relative rest.  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  Stable and unchanged for many years.     MEDICATIONS:  Current Outpatient Medications   Medication Sig Dispense Refill     escitalopram (LEXAPRO) 10 MG tablet Take 1 tablet (10 mg) by mouth daily 90 tablet 3     escitalopram (LEXAPRO) 20 MG tablet Take 1 tablet (20 mg) by mouth daily 30 tablet 3   -Takes Lexapro 30mg qday    ALLERGIES  Amoxicillin and Penicillin g  Voltaren pills:HIVES  Contrast dye for eye imaging:  Hives, tight chest      SOCIAL HISTORY  Social History     Tobacco Use     Smoking status: Never Smoker     Smokeless tobacco: Never Used   Substance Use Topics     Alcohol use: Never     Drug use: Never       FAMILY HISTORY  Grandma with DM    Family History of Anesthesia Reaction: No  Family History of Bleeding Disorder:  No    REVIEW OF SYSTEMS  CONSTITUTIONAL: NEGATIVE for fever, chills, change in  "weight  INTEGUMENTARY/SKIN: NEGATIVE for worrisome rashes, moles or lesions  EYES: NEGATIVE for vision changes or irritation  ENT/MOUTH: NEGATIVE for ear, mouth and throat problems  RESP: NEGATIVE for significant cough or SOB  BREAST: NEGATIVE for masses, tenderness or discharge  CV: NEGATIVE for chest pain, palpitations or peripheral edema  GI: NEGATIVE for nausea, abdominal pain, heartburn, or change in bowel habits  : NEGATIVE for frequency, dysuria, or hematuria  MUSCULOSKELETAL: NEGATIVE for significant arthralgias or myalgia  NEURO: NEGATIVE for weakness, dizziness or paresthesias  ENDOCRINE: NEGATIVE for temperature intolerance, skin/hair changes  HEME: NEGATIVE for bleeding problems  PSYCHIATRIC: NEGATIVE for changes in mood or affect    PHYSICAL EXAM  /75   Pulse 103   Ht 1.753 m (5' 9\")   Wt 78 kg (172 lb)   LMP 04/27/2022 (Exact Date)   BMI 25.40 kg/m    General Appearance: Normal  Skin:  Normal  Head:  Normal  Eyes: Normal  Ears: Normal  Nose: Normal  Mouth/Teeth: Normal  Throat: Normal  Neck: Normal  Chest/Lungs: Normal  Heart/Vascular: Normal  Abdomen: Normal    Skeletal: Normal except for B hips.  See exam per Dr. Freed  Lymphoid: Normal  Blood Vessels: Normal  Neuromuscular: Normal  Other: Normal    LAB/RADIOLOGY RESULTS  Impression:  HEalthy female with RIGHT hip BABATUNDE and labral tearing  Other comments:    PLAN  This patient has been examined by me this day and has been found to be an acceptable candidate for surgery with apppropriate anesthesia.  Lacey Coon MD   5/16/2022            "

## 2022-05-16 NOTE — PROGRESS NOTES
NAME: Manda Lincoln      AGE: 22 year old      SEX: female  Chief Complaint/Reason for Procedure: LEFT hip arthroscopy and labral repair and BABATUNDE Indications:  Pain and failed non-surgical measures  Surgeon:  Dr. Freed Date of Surgery: May 17, 2022 Location: [facility]  HPI: Longstanding anterior hip pain with hockey and ADLs despite rehab, meds, relative rest.  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  Stable and unchanged for many years.     MEDICATIONS:  Current Outpatient Medications   Medication Sig Dispense Refill     escitalopram (LEXAPRO) 10 MG tablet Take 1 tablet (10 mg) by mouth daily 90 tablet 3     escitalopram (LEXAPRO) 20 MG tablet Take 1 tablet (20 mg) by mouth daily 30 tablet 3   -Takes Lexapro 30mg qday    ALLERGIES  Amoxicillin and Penicillin g  Voltaren pills:HIVES  Contrast dye for eye imaging:  Hives, tight chest      SOCIAL HISTORY  Social History     Tobacco Use     Smoking status: Never Smoker     Smokeless tobacco: Never Used   Substance Use Topics     Alcohol use: Never     Drug use: Never       FAMILY HISTORY  Grandma with DM    Family History of Anesthesia Reaction: No  Family History of Bleeding Disorder:  No    REVIEW OF SYSTEMS  CONSTITUTIONAL: NEGATIVE for fever, chills, change in weight  INTEGUMENTARY/SKIN: NEGATIVE for worrisome rashes, moles or lesions  EYES: NEGATIVE for vision changes or irritation  ENT/MOUTH: NEGATIVE for ear, mouth and throat problems  RESP: NEGATIVE for significant cough or SOB  BREAST: NEGATIVE for masses, tenderness or  "discharge  CV: NEGATIVE for chest pain, palpitations or peripheral edema  GI: NEGATIVE for nausea, abdominal pain, heartburn, or change in bowel habits  : NEGATIVE for frequency, dysuria, or hematuria  MUSCULOSKELETAL: NEGATIVE for significant arthralgias or myalgia  NEURO: NEGATIVE for weakness, dizziness or paresthesias  ENDOCRINE: NEGATIVE for temperature intolerance, skin/hair changes  HEME: NEGATIVE for bleeding problems  PSYCHIATRIC: NEGATIVE for changes in mood or affect    PHYSICAL EXAM  /75   Pulse 103   Ht 1.753 m (5' 9\")   Wt 78 kg (172 lb)   LMP 04/27/2022 (Exact Date)   BMI 25.40 kg/m    General Appearance: Normal  Skin:  Normal  Head:  Normal  Eyes: Normal  Ears: Normal  Nose: Normal  Mouth/Teeth: Normal  Throat: Normal  Neck: Normal  Chest/Lungs: Normal  Heart/Vascular: Normal  Abdomen: Normal    Skeletal: Normal except for B hips.  See exam per Dr. Freed  Lymphoid: Normal  Blood Vessels: Normal  Neuromuscular: Normal  Other: Normal    LAB/RADIOLOGY RESULTS  Impression:  HEalthy female with RIGHT hip BABATUNDE and labral tearing  Other comments:    PLAN  This patient has been examined by me this day and has been found to be an acceptable candidate for surgery with apppropriate anesthesia.  Lacey Coon MD   5/16/2022          "

## 2022-06-13 NOTE — PROGRESS NOTES
[facility] ATHLETICS  [facility] AT[facility] follow-up note  Date of service performed: 4/5/2022    Concern/injury: Bilateral hip pain    Assessment/plan: Dr. Freed's office called Alta today and told her they could move her R hip surgery up to Thursday (4/7). She was able to get her pre-op physical with Dr. Coon today, which was faxed over to [facility]. No covid test required prior to surgery. Her mother will drive her to surgery in [place] on Thursday and then she will go back home with her for the evening to recover.    Kenya Meredith, ATC

## 2022-06-14 DIAGNOSIS — F41.8 DEPRESSION WITH ANXIETY: ICD-10-CM

## 2022-06-14 RX ORDER — ESCITALOPRAM OXALATE 20 MG/1
20 TABLET ORAL DAILY
Qty: 90 TABLET | Refills: 1 | Status: SHIPPED | OUTPATIENT
Start: 2022-06-14 | End: 2022-11-30

## 2022-06-14 NOTE — PROGRESS NOTES
Lexapro 20mg refilled as patient takes a total of 30mg daily and was out of 20mg pills.     Sol Palomo MD  Primary Care Sports Medicine Fellow

## 2022-07-01 NOTE — PROGRESS NOTES
[facility] ATHLETICS  Eliel ATC follow-up note  Date of service performed: 4/8/2022    Concern/injury: Right hip labrum post-op  DOS: 4/7/2022    Alta comes in today for dressings to be changed. This was done by ATC Guadalupe Tomlin. Incisions look good with no signs of infection. She also did some circumduction PROM with her hip. Alta will go home for the weekend with her mother. She remains on crutches. Will advance rehab next week as dictated by Dr. Freed's rehab protocol.      Kenya Meredith ATC

## 2022-07-01 NOTE — PROGRESS NOTES
[facility] ATHLETICS  Eliel rehab calendar    Manda Lincoln  Injury requiring rehab: Right hip post-op  DOS: 4/7/2022    Below is the rehab calendar for the week of 4/25-4/29/2022.    Alta was weaned off of crutches this week. She is having tightness/soreness in her hip flexor, but overall is doing well. Continue to strengthen her overall lower body. She continues to do basic pre-hab on her left hip prior to surgery on 5/17.    Mon 4/25 Tues 4/26 Wed 4/27 Thurs 4/28 Fri 4/29     BFR - single leg glute bridge 15/12/12/12    BFR - standing hip flexion 30/15/15/15    BFR - sidelying hip circles 12/10/10/10    Had pain with hip flexion when seated so discontinued and switched to standing, which was better.   Bike 5 min    Glute bridge with band abduction 2x15    Side plank clamshell 2x15    Quadruped alternating arm, alternating leg lift 2x8 each    Swiss ball hamstring curl 2x10    Lateral band walk x2    Monster walk x2    Crunch with legs 90/90 resisted flexion 2x20 sec    Mini squat 2x8    Massage, PROM, stretch   Bike 5 min    BFR - glute bridge with band abduction 30/15/15/15    BFR - quadruped alt. Arm/leg lift 12/10/10/10    BFR - prone hamstring curl 30/15/15/15    BFR - seated leg extension 20/15/15/15    Side plank clamshell 2x15    Sidelying hip [place] 2x15 each    Standing hip flexion 2x15   Ball adduction squeezes with knee extension 2x5    Side plank clamshell 2x15    Step up 2x15    Step down 2x10    Lateral band walk x2    Monster walk x2    Plank 2x15 sec    Mini squat 2x10   BFR - bike 10 min    BFR - swiss ball hamstring curl 30/15/15/15    BFR Step Up 30/15/15/15    BFR - Mini squat 30/15/15/15    BFR - prone SLR with knee bent 30/15/15/15       Kenya Meredith, ATC

## 2022-07-01 NOTE — PROGRESS NOTES
[facility] ATHLETICS  [facility] ATC follow-up note  Date of service performed: 4/5/2022    Concern/injury: Concussion    Alta saw Dr. Coon and Dr. Palomo today for final clearance for her concussion. She has completed full return to play except full contact due to being out of season with no opportunity for full contact. She has gotten her + with no issues. Her SCAT5 and ImPACT are back to baseline.     Assessment/plan: Concussion resolved. Fully cleared with no restrictions on activity. She is having hip surgery on 4/7/2022, which will limit her activity. Should she have any concussion symptoms return, she will contact me immediately. [facility] concussion management plan was followed.    Kenya Meredith, ATC

## 2022-07-01 NOTE — PROGRESS NOTES
[facility] ATHLETICS  Eliel ATC follow-up note  Date of service performed: 4/18/2022    Concern/injury: Right hip post-op  DOS: 4/7/2022    Alta was supposed to have her f/u appointment with Dr. Freed today, but she was sick. We did a covid test that was negative, but we still cancelled the in person appointment at [facility] ([place] I took out her stitches. Incision sites were closed with no bleeding. We will follow up with Dr. Freed about her right hip when she has her left hip surgery in May.    She will continue with same rehab this week:  - Bike 5-10 mins for range of motion  - Russian stim on quad 10' 10 sec on/30 sec off  - PROM - circumduction  - Stomach stretch  - Quadruped rocking  - Posterior pelvic tilt  - Quad sets  - Iso glute squeezes  - Ball adduction (between knees) 2x20  - Ball adduction (between ankles) 2x20    Will start to wean off of crutches next week.      Kenya Meredith, ATC

## 2022-07-01 NOTE — PROGRESS NOTES
[facility] ATHLETICS  Eliel ATC follow-up note  Date of service performed: 4/15/2022    Concern/injury: Right hip post-op  DOS: 4/7/2022    Alta remains on crutches. She is doing relatively well. She is not in much pain and has discontinued use of narcotics. She continues to take naproxen as prescribed by Dr. Freed after surgery. She comes in daily for the following rehab exercises this week (4/11-4/15):    - Bike 5-10 mins for range of motion  - Russian stim on quad 10' 10 sec on/30 sec off  - PROM - circumduction  - Stomach stretch  - Quadruped rocking  - Posterior pelvic tilt  - Quad sets  - Iso glute squeezes  - Ball adduction (between knees) 2x20  - Ball adduction (between ankles) 2x20    She tolerated all exercises well. Have been doing some soft tissue around incision sites.     Kenya Meredith, ATC

## 2022-07-01 NOTE — PROGRESS NOTES
[facility] ATHLETICS  Eliel rehab calendar    Manda Lincoln  Injury requiring rehab: Right hip post-op  DOS: 4/7/2022    Below is the rehab calendar for the week of 5/2-5/6/2022.    Mon 5/2 Tues 5/3 Wed  5/4 Thurs 5/5 Fri 5/6     Bike 10 min    Ball adduction squeeze with knee extension 2x10 marches    SL glute bridge 2x10    Side plank clamshell 2x15    Sidelying hip [place] 2x15 each    Prone hamstring curl 2x15    Step up 2x10    Lateral band walk x2    Monster walk x2    Plank 2x15 sec    Seated leg extension 2x15    SLB 3x15 sec   BFR - glute bridge with band abduction 30/15/15/15    BFR - prone hamstring curl 30/15/15/15    BFR - sidelying adduction 30/15/15/15    BFR - seated leg extension 30/15/15/15    SLB eyes closed 3x20 sec    Leg press 3x10    Deadbug iso hold 3x15 sec   Bike 10 min    Sidelying hip [place] 2x10    Quad alternating arm 2x5 with foam roller    Stool scoot x2    Step down 2x10    Seated hip flexion 2x10    Lateral band walk x2    Monster walk x2    Squat 2x12    SLB eye closed 3x30 sec Sore hip flexor.   - Heat/stim  - Combo  - Graston   - Stretch  - PROM   Bike 10 min    Deadbug with swiss ball 2x20 sec    Side plank clamshell 2x15    Swiss ball iso hold hamstring curl 2x30 sec    Prone SLR with knee bent 2x15    Seated hip flexion 2x10    Lateral band walk x2    Monster walk x2    Plank 2x20 sec    Crunch with legs 90/90 - resisted flexion 2x20 sec    SLB on bosu ball 3x30 sec       Kenya Meredith, ATC

## 2022-07-01 NOTE — PROGRESS NOTES
[facility] ATHLETICS  Eliel rehab calendar    Manda Lincoln  Injury requiring rehab: Right hip post-op  DOS: 4/7/2022    Below is the rehab calendar for the week of 5/9-5/13/2022. Alta's right hip is doing extremely well. Her gait has returned to normal and she is experiencing no pain in ADLs. She does have some tightness/pain in her right hip flexor still. She is scheduled for her left hip surgery next week on Tuesday 5/17.    Tues 5/10 Wed 5/11 Thurs 5/12 Fri 5/13     PROM - circles    AROM - CARs    Bike 10 min    Glute bridge SL 2x15 hold last rep    Quadruped alternating arm 2x5 with foam roller    Prone SLR with knee bent 2x15 hold last rep    Lateral band walk x2    Monster walk x2    Seated leg extension 2x10    SLB on bosu ball 3x30 sec    Chiro appointment with Dr. Jh Navas. Dry needling hip flexor.   Bike 10 min    Swiss ball dead bug 2x20 sec hold    Swiss ball glute bridge 2x10 hold last rep    Swiss ball hamstring curl iso hold 3x30 sec    Reverse step up 2x12    Lateral band walk x2    Monster walk x2    Plank 2x20 sec    Wall sit 2x30 sec    [place]aston adductors   BFR - single leg glute bridge 30/15/15/15 hold last rep    BFR - side plank clamshell 30/15/15/15 hold last rep    Prone hamstring curl 5# 30/15/15/15    BFR - seated leg extension 5# 30/15/15/15   Swiss ball dead bug 2x20 sec iso hold    Ball adduction with knee extension 2x5 each    SB glute bridge 2x10 hold last rep    Side plank clam shell 2x15 hold last rep    Sidelying hip [place] 2x10    Hamstring marches 2x15 hold last rep    Seated hip flexion 2x10    Monster walk x2 with ball    Lateral band walk x2 with ball    Plank + side plank 2x20 sec each    Squat 2x12    Leg press 2x15    Bosu SLB 3x30 sec       Kenya Meredith, ATC

## 2022-07-01 NOTE — PROGRESS NOTES
[facility] ATHLETICS  Eliel ATC follow-up note  Date of service performed: 5/17/2022    Concern/injury: Left hip arthroscopy  DOS: 5/17/2022    Alta had surgery on her left hip today to repair labrum and shave down bone. She had the same procedure on her right hip on 4/7/2022. She is recovering at home with her mother. She will come in in two days for dressings to be changed and to resume right hip rehab.     Kenya Meredith, ATC

## 2022-07-01 NOTE — PROGRESS NOTES
[facility] ATHLETICS  Eliel ATC initial assessment note  Date of service performed: 4/7/2022    Concern: Right hip post-op  Body part: Hip  Description: Right  Injury: Labrum post-op  Type: Athletics related  Date of surgery: 4/7/2022    Alta had surgery on her right hip to repair her labrum and reshape her bone with Dr. Freed at [facility] in [place]. Surgery went well. She will now be on crutches for three weeks. She will be staying at home with her mother for the next couple of days. She will come in tomorrow for dressings to be changed.    Kenya Meredith, ATC

## 2022-08-08 ENCOUNTER — DOCUMENTATION ONLY (OUTPATIENT)
Dept: FAMILY MEDICINE | Facility: CLINIC | Age: 22
End: 2022-08-08

## 2022-08-08 NOTE — PROGRESS NOTES
"[facility] ATHLETIC MEDICINE  [facility]   Sport Psychology Progress Note      Location of Visit: [facility] Athletic Department   Date of Visit: 8/8/22  Duration of Session: 50 minutes  Referred by:     Self-reported Concerns/Symptoms:  Anxiety/worry  Transition/adjustment  Other: Relationship break-up     \"I don't handle the unknown very well.\"     Suicide and Risk Assessment:  Recent suicidal thoughts: No  Past suicidal thoughts: No  Recent homicidal thoughts: No  Any attempts in the past: No  Any family/friends/loved ones die by suicide: No  Plan or considering various methods: No  Access to guns: No  Protective factors: no h/o suicide attempt, no plan or intent, no h/o risky impulsive behavior, no access to lethal means, h/o seeking help when needed, future oriented, feeling hopeful, commitment to family, good social support   and stable housing    Alta denies current urges to self-harm, homicidal ideation, suicidal ideation, means, plans, or intent.    Mental Status & Observations:  Alta appeared generally alert and oriented. Dress was appropriate to the weather and occasion. Grooming and hygiene were appropriate. Eye contact was good. Speech was of normal volume and normal. Thought processes were relevant, logical and goal-directed. Thought content was within normal limits with no evidence of psychotic or paranoid features. Memory appeared intact. She exhibited normal motor activity during the appointment. Mood was appropriate with congruent affect. Insight and judgment appeared age appropriate with good focus in session.  Behavior was candid and open    Observations & Response:   Alta reports that she is taking 30mg Lexapro (). She reports that she noticed an increase in her depression and anxiety symptoms in recent weeks due to some tension with a close friend/teammate/roommate, her grandfather suffering a stroke and her boyfriend being " "long-distance for the summer. She reports that she is also rehabbing from 2 hip surgeries from the spring. She reports noticing increasing quietness, self-blame, feelings of guilt, noticing the \"negative\" and irritability. 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      Historically, she has noticed anxiety, low mood,  irritability, fatigue, indecisiveness, self-criticalness, loss of enjoyment/pleasure, sadness, loss of appetite and feeling down. She reports feeling agitated and restless often, and at at times feels so overwhelmed that she \" legs hard to where they bruise.\" She reports feeling as though she worries most days and feels it's negatively impacting her relationships. She experiences food allergies (e.g., eggs, dairy, chicken, gluten) and some anxiety particularly with social interactions. Known history of mental health in self: Yes, No previous mental health treatment but describes previous symptoms or experiences including tearfulness/crying, feeling low, panic attacks, anxiety about \"lots of things\" including performance/tests, and self-worth concerns. Ct also reports self-criticalness. CT reports that she notices crying multiple times x week and panic attacks 1 x per week where she feels chest tightness, difficulties breathing and \"can't control my thoughts.\" (Fall 2020) Ct also reports impatience with experiences at times where \"if I get an idea, I want to do it right away or I get discouraged.\"    Known history of mental health in family member(s): Yes: Brother was dx with depression and participated in counseling; ADHD; Grandfather- depression    Intervention:   Processed her recent stressors including dynamics with roommate, long-distance relationship and hip recoveries. Validated her in her emotions and experiences, and discussed validating herself as well. Discussed self-care, 02 mask metaphor and " "prioritizing/advocating for self. Also discussed mindfulness/present focus, practicing gratitude, leaning on social support and acting in accordance w/ her character/values/goals rather than emotions at times with her roommate. Emphasized showing herself judy/compassion and also acknowledging her own awareness and insights into how her environment/situation/perspectives impact her own mental health.     Coping skills, strengths and supports:   Communication skills, Exercise, Family support, Insight and sensitivity, Maturity and judgment, Motivation for change, Yarsani/spirituality , Socioeconomic stability, Social support system and Use of available services    Goals for counseling:  Ct reports that she does not \"handle the unknown very well\" and would like to get better at coping with uncertainty.1) \"Learn to embrace the present more and not worry about the future as much.\" 2) \"Learn ways to cope when I'm feeling anxious/panicky.\"    Clinical impressions or Other:  300.02 (F41.4) Generalized Anxiety Disorder  F.33 Major Depressive Disorder, recurrent     Therapy objectives/goals:  Assist with transition into college  Build resilience and response to adversity  Decrease anxiety symptoms  Decrease depressive symptoms  Decrease perceived stress  Enhance self-advocacy  Enhance self-care  Increase assertiveness  Increase mindfulness and the ability to be present  Increase self-awareness  Increase self-esteem and self-worth  Increase willingness to be vulnerable and experience emotions  Improve mood  Provide support  Teach and improve coping skills    Therapy follow-up plan:  Individual counseling sessions weekly  Follow up with sports medicine physician  Participation with [employer] online mental health resource      Jag Ross, PhD LP, [facility]  "

## 2022-08-15 NOTE — PROGRESS NOTES
[facility] ATHLETICS  Eliel ATC follow-up note  Date of service performed: 6/27-6/30/2022    Concern/injury: Bilateral hip post-op  RIGHT DOS: 4/7/2022  LEFT DOS: 5/17/2022    Alta is completely off of crutches and doing well. We are progressing her rehab in the training room.     MONDAY TUESDAY WEDNESDAY THURSDAY 6/27 6/28 6/29 6/30     Right side BFR:  1. 2-1 leg press 20/15/15/15  2. Swiss ball tantrums 30/15/15/15  3. Lateral band walk 30/15/15/15  4. Squat 30/15/15/15    Left side BFR:  1. 2-1 leg press 20/15/15/15  2. Swiss ball hamstring isos 30/15/15/15  3. Monster walk 30/15/15/15  4. Wall Sit 30/15/15/15   Right side:  ? Straight leg raise 2x10 2#  ? Clamshells - knee up & ankle up 2x20 each  ? Reverse Step Up 2x10  ? 2-1 leg press 3x15  ? Swiss ball hamstring curl 2x10  ? Single leg glute bridge 3x12  ? Single leg squat to seat 3x8  ? Single leg balance on pad with catch 3x30 seconds  ? Lunge 3x8    Left side:  ? Quad sets 2x20  ? 2-1 leg press 3x15  ? Swiss ball hamstring isos 10x10 sec hold  ? Single leg glute bridge 2x10  ? Single leg squat to seat 3x5  ? Clamshells - knee up 2x20  ? Single leg balance on pad 3x30 seconds  ? Squat 2x15    Both sides:  ? Plank 2x30 seconds  ? Side plank x30 sec each side  ? Lateral band walk x2  ? Monster walk x2 Right side BFR:  5. 2-1 leg press 20/15/15/15  6. Swiss ball tantrums 30/15/15/15  7. Lateral band walk 30/15/15/15  8. Squat 30/15/15/15    Left side BFR:  5. 2-1 leg press 20/15/15/15  6. Swiss ball hamstring isos 30/15/15/15  7. Monster walk 30/15/15/15  8. Wall Sit 30/15/15/15   Right side:  ? Straight leg raise 2x10 2#  ? Clamshells - knee up & ankle up 2x20 each  ? Reverse Step Up 2x10  ? 2-1 leg press 3x15  ? Swiss ball hamstring curl 2x10  ? Single leg glute bridge 3x12  ? Single leg squat to seat 3x8  ? Single leg balance on pad with catch 3x30 seconds  ? Lunge 3x8    Left side:  ? Quad sets 2x20  ? 2-1 leg press 3x15  ? Swiss ball  hamstring isos 10x10 sec hold  ? Single leg glute bridge 2x10  ? Single leg squat to seat 3x5  ? Clamshells - knee up 2x20  ? Single leg balance on pad 3x30 seconds  ? Squat 2x15    Both sides:  ? Plank 2x30 seconds  ? Side plank x30 sec each side  ? Lateral band walk x2  ? Monster walk x2         Kenya Meredith, ATC

## 2022-08-15 NOTE — PROGRESS NOTES
[facility] ATHLETICS  Eliel ATC follow-up note  Date of service performed: 5/23-5/27/2022    Concern/injury: Bilateral hip post-op  RIGHT DOS: 4/7/2022  LEFT DOS: 5/17/2022 MONDAY TUESDAY WEDNESDAY THURSDAY FRIDAY 5/23 5/24 5/25 5/26 5/27     RIGHT:  ? Banded resisted clamshell abduction 2x10  ? Quadruped alternating arm 2x6 each  ? Hamstring curl 2x10  ? Prone SLR with knee bent 2x10  ? Seated band hip flexion 2x10  ? Seated leg extension 2x10  ? SL squat to seat 2x5  ? SLB 3x20 sec       LEFT:  ? Russian stim 10  10/30  ? PROM - circles  ? Stomach stretch  ? Quadruped rocking  ? Posterior pelvic tilts  ? Quad sets  ? Iso glute squeezes  ? Stomach stretch  ? Ball adduction squeeze (between knees) 2x20  ? Ball adduction squeeze (between ankles) 2x20      RIGHT:   ? Side plank clamshell (banded resisted) 2x10  ? Hamstring curl 6# 2x10  ? Prone SLR with knee bent 6# 2x10  ? Stool scoot x2  ? SL squat to seat 2x5  ? SLB on pad 3x20 sec  ? Quad extensions 6# 2x10      LEFT:  ? Russian stim 10  10/30  ? PROM - circles  ? Stomach stretch  ? Quadruped rocking  ? Posterior pelvic tilts  ? Quad sets  ? Iso glute squeezes  ? Stomach stretch  ? Ball adduction squeeze (between knees) 2x20  ? Ball adduction squeeze (between ankles) 2x20   RIGHT:  ? Banded resisted clamshell abduction 2x10  ? Quadruped alternating arm 2x6 each  ? Hamstring curl 2x10  ? Prone SLR with knee bent 2x10  ? Seated band hip flexion 2x10  ? Seated leg extension 2x10  ? SL squat to seat 2x5  ? SLB 3x20 sec       LEFT:  ? Russian stim 10  10/30  ? PROM - circles  ? Stomach stretch  ? Quadruped rocking  ? Posterior pelvic tilts  ? Quad sets  ? Iso glute squeezes  ? Stomach stretch  ? Ball adduction squeeze (between knees) 2x20  ? Ball adduction squeeze (between ankles) 2x20   RIGHT:   ? Side plank clamshell (banded resisted) 2x10  ? Hamstring curl 6# 2x10  ? Prone SLR with knee bent 6# 2x10  ? Stool scoot x2  ? SL squat to seat 2x5  ?  SLB on pad 3x20 sec  ? Quad extensions 6# 2x10      LEFT:  ? Russian stim 10  10/30  ? PROM - circles  ? Stomach stretch  ? Quadruped rocking  ? Posterior pelvic tilts  ? Quad sets  ? Iso glute squeezes  ? Stomach stretch  ? Ball adduction squeeze (between knees) 2x20  ? Ball adduction squeeze (between ankles) 2x20   RIGHT:  ? Banded resisted clamshell abduction 2x10  ? Quadruped alternating arm 2x6 each  ? Hamstring curl 2x10  ? Prone SLR with knee bent 2x10  ? Seated band hip flexion 2x10  ? Seated leg extension 2x10  ? SL squat to seat 2x5  ? SLB 3x20 sec       LEFT:  ? Russian stim 10  10/30  ? PROM - circles  ? Stomach stretch  ? Quadruped rocking  ? Posterior pelvic tilts  ? Quad sets  ? Iso glute squeezes  ? Stomach stretch  ? Ball adduction squeeze (between knees) 2x20  ? Ball adduction squeeze (between ankles) 2x20         Kenya Meredith, ATC

## 2022-08-15 NOTE — PROGRESS NOTES
[facility] ATHLETICS  Eliel ATC follow-up note  Date of service performed: 8/1-8/5/2022    Concern/injury: Bilateral hip post-op  RIGHT DOS: 4/7/2022  LEFT DOS: 5/17/2022    MONDAY TUESDAY WEDNESDAY THURSDAY FRIDAY   8/1 8/2 8/3 8/4 8/5        RIGHT BFR:  ? Clamshells 30/15/15/15  ? Lateral band walk 30/15/15/15  ? Swiss ball hamstring curl 30/15/15/15  ? Seated leg extensions 30/15/15/15      LEFT BFR:  ? Clamshells 30/15/15/15  ? Lateral band walk 30/15/15/15  ? Swiss ball hamstring curl 30/15/15/15  ? Seated leg extensions 30/15/15/15   RIGHT:  ? Single leg leg press hops 3x15  ? Single leg 3-way taps 3x8  ? Swiss ball hamstring iso hold 5x10 seconds  ? Assisted reverse lunge 2x15  ? Curtsy lunge 2x10  ? Eccentric 2-1 calf raise 3x15  ? Skater hops 3x10  ? Single leg hops forwards/backwards 2x20 seconds  ? Single leg hops side to side 2x20 seconds  ? Single leg hop forward for controlled landing x8 total    LEFT:   ? Single leg leg press hop 3x10  ? Single leg 3-way taps 3x8  ? Assisted squat to box 3x8  ? Curtsy lunge 2x10  ? Pendulum lunge 2x8   ? Cup  3x5  ? Swiss ball hamstring iso hold 5x10 seconds  ?  Eccentric 2-1 calf raises  ? Single leg balance on slant board 2x30 seconds each direction   Out of town   Out of town         Kenya Meredith, ATC

## 2022-08-15 NOTE — PROGRESS NOTES
[facility] ATHLETICS  Eliel ATC follow-up note  Date of service performed: 8/8-8/12/2022    Concern/injury: Bilateral hip post-op  RIGHT DOS: 4/7/2022  LEFT DOS: 5/17/2022    MONDAY TUESDAY WEDNESDAY THURSDAY FRIDAY   8/8 8/9 8/10 8/11 8/12   Skated for first time for ~10-15 minutes. No pain in either hip.    RIGHT:  ? Single leg leg press 3x12  ? Assisted single leg squat 3x6  ? Assisted reverse lunge 2x8  ? Antirotational squat 2x10 each direction  ? Lateral band walk 2x  ? Monster walk 2x  ? Eccentric 2-1 calf raises 3x15  ? Single leg balance on BOSU 2x40 seconds      LEFT:   ? Single leg leg press 3x12  ? Assisted single leg squat 3x6  ? Assisted reverse lunge 2x8  ? Antirotational squat 2x10 each direction  ? Glider stride 2x12 each  ? Deadlift 2x15  ? Eccentric 2-1 calf raises 3x15  ? Single leg balance on BOSU 2x40 seconds   Skated for 10-15 minutes.    RIGHT BFR:  ? Chilean ball hamstring iso curl 30/15/15/15  ? Single leg 3-way tap 15/10/10/10  ? Curtsy lunge 30/15/15/15  ? Standing fire hydrant 30/15/15/15      LEFT BFR:  ? Chilean ball hamstring iso curl 30/15/15/15  ? Glider -   [place] 20/15/15/15  ? Glider stride lunge 15/10/10/10  ? Standing fire hydrant 30/15/15/15   RIGHT:  ? Single leg leg press hops 3x15  ? Single leg 3-way taps 3x8  ? Hamstring tantrum 3x30 sec  ? Assisted reverse lunge 2x15  ? Curtsy lunge 2x10  ? Eccentric 2-1 calf raise 3x15  ? Skater hops 3x10  ? Single leg hops forwards/backwards 2x20 seconds  ? Single leg hops side to side 2x20 seconds  ? Single leg hop forward for controlled landing x8 total    LEFT:   ? Single leg leg press hop 3x10  ? Single leg 3-way taps 3x8  ? Assisted squat to box 3x8  ? Assisted reverse lunge 2x8  ? Curtsy lunge 2x10  ? Single leg cross body reach 2x8  ?  Eccentric 2-1 calf raises  ? Single leg balance on slant board 2x30 seconds each direction   RIGHT BFR:  ? Single leg leg press hop 30/15/15/15 (go to 2-1 or double leg leg press when  fatigued)  ? Sidelying rainbows 30/15/15/15  ? Glider   circles 30/15/15/15  ? Antirotational squat 20/10/10/10      LEFT BFR:  ? Belarusian split squat 20/10/10/10  ? Stool scoots 30/15/15/15  ? Calf raises 30/15/15/15  ? Assisted single leg squat to seat 15/8/8/8     RIGHT:   ? Single leg leg press hops 3x15  ? Lateral lunge 2x10  ? Single leg hops forwards/backwards 2x20 seconds  ? Single leg side to side hops 2x20 seconds  ? Single leg hop forward for controlled landing x10 total  ? Single leg box jumps 3x5  ? Calf raises 2x20  ? Cup  2x8      LEFT:  ? Single leg leg press hops 3x15  ? Lateral lunge 2x10  ? Double leg hops forwards 4x6  ? Double leg box jump 3x5  ? Double leg lateral hops 3x15 seconds  ? Double leg forward/backward hops 3x15 seconds  ? Cup  2x8         Kenya Meredith, ATC

## 2022-08-15 NOTE — PROGRESS NOTES
[facility] ATHLETICS  Eliel ATC follow-up note  Date of service performed: 7/25-7/29/2022    Concern/injury: Bilateral hip post-op  RIGHT DOS: 4/7/2022  LEFT DOS: 5/17/2022 MONDAY TUESDAY WEDNESDAY THURSDAY FRIDAY 7/25 7/26 7/27 7/28 7/29     RIGHT:  BFR:    Single leg leg press hop 30/15/15/15    Lateral step up 30/15/15/15    Hamstring curl 30/15/15/15    Glider - stride 30/15/15/15    LEFT:  BFR:     Step Up 30/15/15/15    Prone hamstring curl 30/15/15/15    Glider - stride 30/15/15/15    Standing hip flexion with band 30/15/15/15       RIGHT:    2-1 single leg leg press 3x12    Assisted reverse lunge 2x10    90/90 back leg lift 3x12    2-1 squat 3x8    Reverse step up 2x12    Lateral band walk 2x    Monster walk 2x    Clamshells - ankle move 2x25 hold last rep; knee move 2x25 hold last rep    Single leg balance on slant board x30 seconds each direction    LEFT:      Single leg leg press 3x12    Portuguese split squat 3x6    Single leg squat to seat 3x6    Banded hyperextensions 2x15    Clamshells - ankle move 2x25 hold last rep; knee move 2x25 hold last rep    Single leg balance on slant board x30 seconds each direction    Antirotation squat 2x10 each direction    Antirotation press 2x10 each direction     RIGHT:  BFR:    Single leg leg press 30/15/15/15    Decline squat 30/15/15/15    Prone hyperextension + band pull apart 30/15/15/15    Lateral band walk 30/15/15/15    LEFT:  BFR:    Single leg leg press 30/15/15/15    Deadlift (double leg) 30/15/15/15    Monster walk 30/15/15/15    Alternating lunge (assisted with band) 20/12/12/12   RIGHT:    Hamstring iso hold into bosu ball 3x30 seconds    Eccentric knee extensions 3x10 (quick on the way up, slow on the way down)    Eccentric squats (quick on the way up, slow on the way down) 3x10    Walking lunge 2x10    2-1 decline squat 2x8    Clamshells - ankle move 2x25 hold last rep; knee move 2x25 hold last rep    Single leg balance on slant board  x30 seconds each direction    LEFT:      Hamstring iso hold into bosu ball 3x30 seconds    Eccentric knee extensions 3x10 (quick on the way up, slow on the way down)    Eccentric squats (quick on the way up, slow on the way down) 3x10    Clamshells - ankle move 2x25 hold last rep; knee move 2x25 hold last rep    Single leg balance on slant board x30 seconds each direction    Antirotation squat 2x10 each direction    Antirotation press 2x10 each direction     RIGHT:  BFR:    Assisted single leg squat 12/8/8/8    Stool scoot 30/15/15/15    Single leg glute bridge 30/15/15/15    Glider -   circles 30/15/15/15    LEFT:  BFR:    Single leg squat to seat 12/10/10/10    Stool scoot 30/15/15/15    Glider -   circles 30/15/15/15    Cross body reach 15/10/10/10           Kenya Meredith, ATC

## 2022-08-15 NOTE — PROGRESS NOTES
[facility] ATHLETICS  Eliel ATC follow-up note  Date of service performed: 5/31/2022    Concern/injury: Bilateral hip post-op  RIGHT DOS: 4/7/2022  LEFT DOS: 5/17/2022    ? Bike 15 minutes  ? Posterior Pelvic Tilt x10, with right leg extension x10  ? Ball squeeze 2x20  ? Straight leg raise (right leg) 2x10  ? Single leg glute bridge (right side) 2x15  ? Single leg hamstring curl (right side) 2x15  ? Seated leg extension (right side) 2x15  ? Seated hip flexion (right side ) 2x15  ? Single leg squat to seat (right side) 3x6  ? Quadruped rocking  ? Quadruped opposite arm extension 3x5 each side  ? Isometric hamstring hold (left side) 2x20 - 2 second hold each  ? Isometric glute squeeze (single leg left side - push foot into table, don t have to lift butt off of table)  2x20 - 2 second hold   ? Isometric quad set (left side) 2x20   ? Prone pendulum (on stomach, bend at knee and move leg side to side) 2x10 each way each leg  ? Sidelying hip abduction (lie on either side and lift top leg in air) 3x10 each leg    Alta can start working on proper gait without crutches - doing exaggerated high knee for steps and putting pressure on left side. Still use crutches for walking longer distances and/or if not walking properly    Kenya Meredith, ATC

## 2022-08-15 NOTE — PROGRESS NOTES
[facility] ATHLETICS  Eliel ATC follow-up note  Date of service performed: 6/6-6/10/2022    Concern/injury: Bilateral hip post-op  RIGHT DOS: 4/7/2022  LEFT DOS: 5/17/2022    MONDAY TUESDAY WEDNESDAY THURSDAY FRIDAY   6/6 6/7 6/8 6/9 6/10            RIGHT:  BFR:   ? Prone hamstring curl 30/15/15/15  ? Single leg to seat 15/10/10/10  ? Seated hip flexion 30/15/15/15  ? Seated knee extension 30/15/15/15  ? Single leg glute bridge on box 30/15/15/15  Without BFR:  ? Stool scoots 2x  ? Clamshells 2x15  ? PPT with arm extension 2x8  ? SLB 3x30 sec on pad      LEFT:  BFR:   ? Standing/seated hip flexion 30/15/15/15  ? SL (or DL) glute bridge 20/15/15/15  ? Prone hip extension 30/15/15/15  ? Seated knee extensions 30/15/15/15  Without BFR:  ? Sidelying hip circles 2x15 each direction  ? Clamshells 2x15      Work on gait/walking             RIGHT:  ? Swiss ball hamstring curl 2x12  ? Prone SLR with knee bent 2x12  ? SL to seat 2x8  ? SLR - foot up and out 2x10 each   ? Single leg leg press 2x10  ? Seated hip flexion 2x15  ? Clamshells 2x15  ? PPT with arm extension 2x8      LEFT:  ? Glute bridge with band abduction 2x15  ? Clamshells 2x15  ? Prone hamstring curl 2x15  ? Seated knee extensions 2x15  ? Lateral band walks x2  ? Monster walks x2  ? Mini squats 2x8   RIGHT:  BFR:   ? Mauritanian ball hamstring curl 30/15/15/15  ? Single leg to seat 15/10/10/10  ? Seated knee extension 30/15/15/15  ? Single leg glute bridge on box 30/15/15/15  Without BFR:  ? Seated hip flexion 2x15  ? Stool scoots 2x  ? Clamshells 2x15  ? PPT with arm extension 2x8  ? SLB 3x30 sec on pad      LEFT:  BFR:   ? Standing/seated hip flexion 30/15/15/15  ? SL (or DL) glute bridge 20/15/15/15  ? Prone hip extension 30/15/15/15  ? Seated knee extensions 30/15/15/15  Without BFR:  ? Sidelying hip circles 2x15 each direction  ? Clamshells 2x15     RIGHT:  ? Swiss ball hamstring curl 2x12  ? Prone SLR with knee bent 2x12  ? SL to seat 2x8  ? SLR -  foot up and out 2x10 each   ? Single leg leg press 2x10  ? Seated hip flexion 2x15  ? Clamshells 2x15  ? PPT with arm extension 2x8      LEFT:  ? Glute bridge with band abduction 2x15  ? Clamshells 2x15  ? Prone hamstring curl 2x15  ? Seated knee extensions 2x15  ? Lateral band walks x2  ? Monster walks x2  ? Mini squats 2x8     Alta is slowly getting off of crutches. She is doing well with her rehab. She is noticing some discomfort in her hip flexor on her left side.    Kenya Meredith, ATC

## 2022-08-15 NOTE — PROGRESS NOTES
[facility] ATHLETICS  [facility] ATC follow-up note  Date of service performed: 6/1/2022    Concern/injury: Bilateral hip post-op  RIGHT DOS: 4/7/2022  LEFT DOS: 5/17/2022    Assessment/plan: Dr. Abdiaziz Jarrett's PA, today. All looks well. Stitches were removed. No signs of infection. Gradually wean off of crutches and strengthen bilateral hips. She may skate at 3 months if ROM and strength are back to normal. Will follow up with Dr. Freed in August.    Kenya Meredith, ATC

## 2022-08-15 NOTE — PROGRESS NOTES
[facility] ATHLETICS  Eliel ATC follow-up note  Date of service performed: 5/16-5/20/2022    Concern/injury: Bilateral hip post-op  RIGHT DOS: 4/7/2022  LEFT DOS: 5/17/2022 MONDAY TUESDAY WEDNESDAY THURSDAY FRIDAY 5/16 5/17 5/18 5/19 5/20     BFR:   ? Glute bridge 30/15/15/15 - hold last rep    ? Side plank clamshell 30/15/15/15 -hold last rep    ? Prone hamstring curl 5# 30/15/15/15 - hold last rep    ? Lateral band walks with ball x4    ? Leg press double leg 30/15/15/15    Without BFR:  ? Quadruped alternating arm lift 2x8    ? Prone SLR with knee bent 2x10, hold last rep    ? Stool scoot x2    ? Seated hip flexion 2x12    ? Squat on bosu 2x12    ? SLB 3x30 sec on bosu ball        Surgery    RIGHT:   ? Side plank clamshell (banded resisted) 2x10  ? Hamstring curl 6# 2x10  ? Prone SLR with knee bent 6# 2x10  ? Stool scoot x2  ? SL squat to seat 2x5  ? SLB on pad 3x20 sec  ? Quad extensions 6# 2x10      LEFT:  ? Russian stim 10  10/30  ? PROM - circles  ? Stomach stretch  ? Quadruped rocking  ? Posterior pelvic tilts  ? Quad sets  ? Iso glute squeezes  ? Stomach stretch  ? Ball adduction squeeze (between knees) 2x20  ? Ball adduction squeeze (between ankles) 2x20   Did rehab on own    RIGHT:  ? Banded resisted clamshell abduction 2x10  ? Quadruped alternating arm 2x6 each  ? Hamstring curl 2x10  ? Prone SLR with knee bent 2x10  ? Seated band hip flexion 2x10  ? Seated leg extension 2x10  ? SL squat to seat 2x5  ? SLB 3x20 sec       LEFT:  ? Russian stim 10  10/30  ? PROM - circles  ? Stomach stretch  ? Quadruped rocking  ? Posterior pelvic tilts  ? Quad sets  ? Iso glute squeezes  ? Stomach stretch  ? Ball adduction squeeze (between knees) 2x20  ? Ball adduction squeeze (between ankles) 2x20         Kenya Meredith, ATC

## 2022-08-24 ENCOUNTER — DOCUMENTATION ONLY (OUTPATIENT)
Dept: FAMILY MEDICINE | Facility: CLINIC | Age: 22
End: 2022-08-24

## 2022-08-24 NOTE — PROGRESS NOTES
"[facility] ATHLETIC MEDICINE  [facility]   Sport Psychology Progress Note      Location of Visit: [facility] Athletic Department   Date of Visit: 8/24/22  Duration of Session: 50 minutes  Referred by:     Self-reported Concerns/Symptoms:  Anxiety/worry  Transition/adjustment  Other: Relationship break-up     \"I don't handle the unknown very well.\"     Suicide and Risk Assessment:  Recent suicidal thoughts: No  Past suicidal thoughts: No  Recent homicidal thoughts: No  Any attempts in the past: No  Any family/friends/loved ones die by suicide: No  Plan or considering various methods: No  Access to guns: No  Protective factors: no h/o suicide attempt, no plan or intent, no h/o risky impulsive behavior, no access to lethal means, h/o seeking help when needed, future oriented, feeling hopeful, commitment to family, good social support   and stable housing    Alta denies current urges to self-harm, homicidal ideation, suicidal ideation, means, plans, or intent.    Mental Status & Observations:  Alta appeared generally alert and oriented. Dress was appropriate to the weather and occasion. Grooming and hygiene were appropriate. Eye contact was good. Speech was of normal volume and normal. Thought processes were relevant, logical and goal-directed. Thought content was within normal limits with no evidence of psychotic or paranoid features. Memory appeared intact. She exhibited normal motor activity during the appointment. Mood was appropriate with congruent affect. Insight and judgment appeared age appropriate with good focus in session.  Behavior was candid and open    Observations & Response:   Alta reports that she is taking 30mg Lexapro () and has her physical tomorrow with her team physician. She reports that she noticed an increase in her depression and anxiety symptoms in recent weeks due to some tension with a close friend/teammate/roommate, her " "grandfather suffering a stroke and her boyfriend being long-distance for the summer. She reports that she is also rehabbing from 2 hip surgeries from the spring, but notes that her rehabilitation is going well. She reports noticing increasing quietness, self-blame, feelings of guilt, noticing the \"negative\" and irritability. 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      Historically, she has noticed anxiety, low mood,  irritability, fatigue, indecisiveness, self-criticalness, loss of enjoyment/pleasure, sadness, loss of appetite and feeling down. She reports feeling agitated and restless often, and at at times feels so overwhelmed that she \" legs hard to where they bruise.\" She reports feeling as though she worries most days and feels it's negatively impacting her relationships. She experiences food allergies (e.g., eggs, dairy, chicken, gluten) and some anxiety particularly with social interactions. Known history of mental health in self: Yes, No previous mental health treatment but describes previous symptoms or experiences including tearfulness/crying, feeling low, panic attacks, anxiety about \"lots of things\" including performance/tests, and self-worth concerns. Ct also reports self-criticalness. CT reports that she notices crying multiple times x week and panic attacks 1 x per week where she feels chest tightness, difficulties breathing and \"can't control my thoughts.\" (Fall 2020) Ct also reports impatience with experiences at times where \"if I get an idea, I want to do it right away or I get discouraged.\"    Known history of mental health in family member(s): Yes: Brother was dx with depression and participated in counseling; ADHD; Grandfather- depression    Intervention:   Introduced and provided psychoeducation on Acceptance-Commitment Therapy (ACT) principle of responding to situations in ways that align with her " "goals and values. Explored her relationship with her roommate/teammate and the complexities of their dynamics. Assisted Alta increasing her own awareness regarding her intuition, goals of the relationship and values. She shared wanting to spend time with others and be more assertive/\"stand-up\" for herself, but feels \"stuck\" in how to do this when she historically has been very close to this teammate/friend. She shared ideas and how she wants to broach communication as well. Validated and normalized Alta in her experiences with this situation/friend and worked to empower her in how she chooses to handle it for herself.     Coping skills, strengths and supports:   Communication skills, Exercise, Family support, Insight and sensitivity, Maturity and judgment, Motivation for change, Sabianist/spirituality , Socioeconomic stability, Social support system and Use of available services    Goals for counseling:  Ct reports that she does not \"handle the unknown very well\" and would like to get better at coping with uncertainty.1) \"Learn to embrace the present more and not worry about the future as much.\" 2) \"Learn ways to cope when I'm feeling anxious/panicky.\"    Clinical impressions or Other:  300.02 (F41.4) Generalized Anxiety Disorder  F.33 Major Depressive Disorder, recurrent     Therapy objectives/goals:  Assist with transition into college  Build resilience and response to adversity  Decrease anxiety symptoms  Decrease depressive symptoms  Decrease perceived stress  Enhance self-advocacy  Enhance self-care  Increase assertiveness  Increase mindfulness and the ability to be present  Increase self-awareness  Increase self-esteem and self-worth  Increase willingness to be vulnerable and experience emotions  Improve mood  Provide support  Teach and improve coping skills    Therapy follow-up plan:  Individual counseling sessions weekly  Follow up with sports medicine physician  Participation with Learn to Live online " mental health resource      Jag Ross, PhD LP, CMPC

## 2022-09-08 ENCOUNTER — DOCUMENTATION ONLY (OUTPATIENT)
Dept: FAMILY MEDICINE | Facility: CLINIC | Age: 22
End: 2022-09-08

## 2022-09-08 NOTE — PROGRESS NOTES
"[facility] ATHLETIC MEDICINE  [facility]   Sport Psychology Progress Note      Location of Visit: [facility] Athletic Department   Date of Visit: 9/8/22  Duration of Session: 45 minutes  Referred by:     Self-reported Concerns/Symptoms:  Anxiety/worry  Transition/adjustment  Other: Relationship break-up     \"I don't handle the unknown very well.\"     Suicide and Risk Assessment:  Recent suicidal thoughts: No  Past suicidal thoughts: No  Recent homicidal thoughts: No  Any attempts in the past: No  Any family/friends/loved ones die by suicide: No  Plan or considering various methods: No  Access to guns: No  Protective factors: no h/o suicide attempt, no plan or intent, no h/o risky impulsive behavior, no access to lethal means, h/o seeking help when needed, future oriented, feeling hopeful, commitment to family, good social support   and stable housing    Alta denies current urges to self-harm, homicidal ideation, suicidal ideation, means, plans, or intent.    Mental Status & Observations:  Alta appeared generally alert and oriented. Dress was appropriate to the weather and occasion. Grooming and hygiene were appropriate. Eye contact was good. Speech was of normal volume and normal. Thought processes were relevant, logical and goal-directed. Thought content was within normal limits with no evidence of psychotic or paranoid features. Memory appeared intact. She exhibited normal motor activity during the appointment. Mood was appropriate with congruent affect. Insight and judgment appeared age appropriate with good focus in session.  Behavior was candid and open    Observations & Response:   Alta reports that she is taking 30mg Lexapro (). She reports that she noticed an increase in her depression and anxiety symptoms in recent weeks. She reports some perceived stress with her coaches and teammates not understanding her injury recovery process at times.  She " "reports noticing increasing quietness, self-blame, feelings of guilt, noticing the \"negative\" and irritability at times. 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      Historically, she has noticed anxiety, low mood,  irritability, fatigue, indecisiveness, self-criticalness, loss of enjoyment/pleasure, sadness, loss of appetite and feeling down. She reports feeling agitated and restless often, and at at times feels so overwhelmed that she \" legs hard to where they bruise.\" She reports feeling as though she worries most days and feels it's negatively impacting her relationships. She experiences food allergies (e.g., eggs, dairy, chicken, gluten) and some anxiety particularly with social interactions. Known history of mental health in self: Yes, No previous mental health treatment but describes previous symptoms or experiences including tearfulness/crying, feeling low, panic attacks, anxiety about \"lots of things\" including performance/tests, and self-worth concerns. Ct also reports self-criticalness. CT reports that she notices crying multiple times x week and panic attacks 1 x per week where she feels chest tightness, difficulties breathing and \"can't control my thoughts.\" (Fall 2020) Ct also reports impatience with experiences at times where \"if I get an idea, I want to do it right away or I get discouraged.\"    Known history of mental health in family member(s): Yes: Brother was dx with depression and participated in counseling; ADHD; Grandfather- depression    Intervention:   Discussed recent changes to her rehabilitation and rejoining the team in training. She notes some difficulties with feeling supported by coaches in her recovery process. Revisited themes of self-advocacy, communication and asserting what she feels she needs for herself to be successful. She notes the decision-making responsibility being on her, which feels new and " "challenging at times.     Coping skills, strengths and supports:   Communication skills, Exercise, Family support, Insight and sensitivity, Maturity and judgment, Motivation for change, Yarsani/spirituality , Socioeconomic stability, Social support system and Use of available services    Goals for counseling:  Ct reports that she does not \"handle the unknown very well\" and would like to get better at coping with uncertainty.1) \"Learn to embrace the present more and not worry about the future as much.\" 2) \"Learn ways to cope when I'm feeling anxious/panicky.\"    Clinical impressions or Other:  300.02 (F41.4) Generalized Anxiety Disorder  F.33 Major Depressive Disorder, recurrent     Therapy objectives/goals:  Assist with transition into college  Build resilience and response to adversity  Decrease anxiety symptoms  Decrease depressive symptoms  Decrease perceived stress  Enhance self-advocacy  Enhance self-care  Increase assertiveness  Increase mindfulness and the ability to be present  Increase self-awareness  Increase self-esteem and self-worth  Increase willingness to be vulnerable and experience emotions  Improve mood  Provide support  Teach and improve coping skills    Therapy follow-up plan:  Individual counseling sessions weekly  Follow up with sports medicine physician  Participation with Learn to Live online mental health resource      Jag Ross, PhD LP, CMPC  "

## 2022-10-05 ENCOUNTER — OFFICE VISIT (OUTPATIENT)
Dept: ORTHOPEDICS | Facility: CLINIC | Age: 22
End: 2022-10-05
Payer: COMMERCIAL

## 2022-10-05 VITALS
BODY MASS INDEX: 26.42 KG/M2 | WEIGHT: 178.4 LBS | DIASTOLIC BLOOD PRESSURE: 81 MMHG | HEART RATE: 94 BPM | SYSTOLIC BLOOD PRESSURE: 115 MMHG | HEIGHT: 69 IN

## 2022-10-05 DIAGNOSIS — R21 RASH AND NONSPECIFIC SKIN ERUPTION: Primary | ICD-10-CM

## 2022-10-05 RX ORDER — PREDNISONE 20 MG/1
40 TABLET ORAL DAILY
Qty: 10 TABLET | Refills: 0 | Status: SHIPPED | OUTPATIENT
Start: 2022-10-05 | End: 2022-10-10

## 2022-10-05 NOTE — LETTER
"  10/5/2022      RE: Manda Lincoln  [address]  [place] [address]     Dear Colleague,    Thank you for referring your patient, Manda Lincoln, to the [facility]UDENT [facility]THLETIC [facility]. Please see a copy of my visit note below.    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  Alta noticed a raised, slightly itchy rash throughout her left ankle, this developed over the course of this week.  No systemic symptoms, no previous rash like this.  She has tried hydrocortisone cream, unfortunately this has not helped very much.    Alta does recall using a lidocaine roll-on, although she has done well with lidocaine patches in the past.    Additional history: as documented    MEDICAL HISTORY  There is no problem list on file for this patient.      Current Outpatient Medications   Medication Sig Dispense Refill     escitalopram (LEXAPRO) 10 MG tablet Take 1 tablet (10 mg) by mouth daily 90 tablet 3     escitalopram (LEXAPRO) 20 MG tablet Take 1 tablet (20 mg) by mouth daily 90 tablet 1       Allergies   Allergen Reactions     Amoxicillin      Penicillin G        No family history on file.    Additional medical/Social/Surgical histories reviewed in [facility] and updated as appropriate.        PHYSICAL EXAM  Vitals:    10/05/22 0920   BP: 115/81   Pulse: 94   Weight: 80.9 kg (178 lb 6.4 oz)   Height: 1.753 m (5' 9\")     Physical Exam  Vitals reviewed. Exam conducted with a chaperone present.   Constitutional:       Appearance: Normal appearance.   HENT:      Mouth/Throat:      Pharynx: Oropharynx is clear.   Eyes:      Pupils: Pupils are equal, round, and reactive to light.   Pulmonary:      Effort: Pulmonary effort is normal.   Skin:     General: Skin is warm and dry.      Findings: Rash present. No erythema or wound. Rash is papular. Rash is not crusting, nodular or purpuric.          Neurological: "      Mental Status: She is alert.   Psychiatric:         Mood and Affect: Mood normal.         Thought Content: Thought content normal.            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    The etiology of her rash is difficult to determine, this does share clinical characteristics of contact dermatitis.    This does not seem infectious or otherwise dangerous.    However, given that this is spreading to her other ankle we did discuss treating with oral prednisone, as this is becoming more bothersome.  I also recommend that she remove any other potential offenders, such as not using the new lidocaine roll-on.    If this is not improving over the course of the week we should follow-up.    It was a pleasure seeing Alta today.    Alexandre Richard DO, CAM  Primary Care Sports Medicine      This note was constructed using Dragon dictation software, please excuse any minor errors in spelling, grammar, or syntax.

## 2022-10-05 NOTE — PROGRESS NOTES
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  Alta noticed a raised, slightly itchy rash throughout her left ankle, this developed over the course of this week.  No systemic symptoms, no previous rash like this.  She has tried hydrocortisone cream, unfortunately this has not helped very much.    Alta does recall using a lidocaine roll-on, although she has done well with lidocaine patches in the past.    Additional history: as documented    MEDICAL HISTORY  There is no problem list on file for this patient.      Current Outpatient Medications   Medication Sig Dispense Refill     escitalopram (LEXAPRO) 10 MG tablet Take 1 tablet (10 mg) by mouth daily 90 tablet 3     escitalopram (LEXAPRO) 20 MG tablet Take 1 tablet (20 mg) by mouth daily 90 tablet 1       Allergies   Allergen Reactions     Amoxicillin      Penicillin G        No family history on file.    Additional medical/Social/Surgical histories reviewed in [facility] and updated as appropriate.        PHYSICAL EXAM  Vitals:    10/05/22 0920   BP: 115/81   Pulse: 94   Weight: 80.9 kg (178 lb 6.4 oz)   Height: 1.753 m (5' 9\")     Physical Exam  Vitals reviewed. Exam conducted with a chaperone present.   Constitutional:       Appearance: Normal appearance.   HENT:      Mouth/Throat:      Pharynx: Oropharynx is clear.   Eyes:      Pupils: Pupils are equal, round, and reactive to light.   Pulmonary:      Effort: Pulmonary effort is normal.   Skin:     General: Skin is warm and dry.      Findings: Rash present. No erythema or wound. Rash is papular. Rash is not crusting, nodular or purpuric.          Neurological:      Mental Status: She is alert.   Psychiatric:         Mood and Affect: Mood normal.         Thought Content: Thought content normal.            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    The etiology of her rash is difficult to determine, this does share clinical characteristics of contact dermatitis.    This does not seem infectious or otherwise dangerous.    However, given that this is spreading to her other ankle we did discuss treating with oral prednisone, as this is becoming more bothersome.  I also recommend that she remove any other potential offenders, such as not using the new lidocaine roll-on.    If this is not improving over the course of the week we should follow-up.    It was a pleasure seeing Alta today.    Alexandre Richard DO, [facility]  Primary Care Sports Medicine      This note was constructed using Dragon dictation software, please excuse any minor errors in spelling, grammar, or syntax.

## 2022-10-06 ENCOUNTER — DOCUMENTATION ONLY (OUTPATIENT)
Dept: FAMILY MEDICINE | Facility: CLINIC | Age: 22
End: 2022-10-06

## 2022-10-06 NOTE — PROGRESS NOTES
"[facility] ATHLETIC MEDICINE  [facility]   Sport Psychology Progress Note      Location of Visit: [facility] Athletic Department   Date of Visit: 10/6/22  Duration of Session: 45 minutes  Referred by:     Self-reported Concerns/Symptoms:  Anxiety/worry  Transition/adjustment  Other: Relationship break-up     \"I don't handle the unknown very well.\"     Suicide and Risk Assessment:  Recent suicidal thoughts: No  Past suicidal thoughts: No  Recent homicidal thoughts: No  Any attempts in the past: No  Any family/friends/loved ones die by suicide: No  Plan or considering various methods: No  Access to guns: No  Protective factors: no h/o suicide attempt, no plan or intent, no h/o risky impulsive behavior, no access to lethal means, h/o seeking help when needed, future oriented, feeling hopeful, commitment to family, good social support   and stable housing    Alta denies current urges to self-harm, homicidal ideation, suicidal ideation, means, plans, or intent.    Mental Status & Observations:  Alta appeared generally alert and oriented. Dress was appropriate to the weather and occasion. Grooming and hygiene were appropriate. Eye contact was good. Speech was of normal volume and normal. Thought processes were relevant, logical and goal-directed. Thought content was within normal limits with no evidence of psychotic or paranoid features. Memory appeared intact. She exhibited normal motor activity during the appointment. Mood was crying with congruent affect. Insight and judgment appeared age appropriate with good focus in session.  Behavior was candid and open    Observations & Response:   Alta reports that she is taking 30mg Lexapro () but does not feel her medication is working to alleviate her anxiety/depression as well as it used to. She notes continued increase in her depression and anxiety symptoms in past 2 months. She notes noticing wanting to sleep, " "tearfulness and feeling inadequate and under-valued. She reports noticing quietness, self-blame, feelings of guilt, noticing the \"negative\" and irritability at timesShe reports feeling hopeless/helpless and \"lost\" at times. She denies suicidal thoughts. She reports being told today that she will be sitting in the stands for the game \"scratched\" for the first time. She reports that it feels demoralizing to her. She reports significant tension with her teammate/roommate to the point where she states feeling very unhappy, miserable and looking for options to move. She reports that her grandfather recently was readmitted to the hospital as well. She notes feeling very supported by her parents.  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    Historically, she has noticed anxiety, low mood,  irritability, fatigue, indecisiveness, self-criticalness, loss of enjoyment/pleasure, sadness, loss of appetite and feeling down. She reports feeling agitated and restless often, and at at times feels so overwhelmed that she \" legs hard to where they bruise.\" She reports feeling as though she worries most days and feels it's negatively impacting her relationships. She experiences food allergies (e.g., eggs, dairy, chicken, gluten) and some anxiety particularly with social interactions. Known history of mental health in self: Yes, No previous mental health treatment but describes previous symptoms or experiences including tearfulness/crying, feeling low, panic attacks, anxiety about \"lots of things\" including performance/tests, and self-worth concerns. Ct also reports self-criticalness. CT reports that she notices crying multiple times x week and panic attacks 1 x per week where she feels chest tightness, difficulties breathing and \"can't control my thoughts.\" (Fall 2020) Ct also reports impatience with experiences at times where \"if I get an idea, I want to do it right away or I get discouraged.\"    Known history " "of mental health in family member(s): Yes: Brother was dx with depression and participated in counseling; ADHD; Grandfather- depression    Intervention:   Processed her reactions/emotions and perspectives on recent situational stressors/events including her position on the hockey team and the tension with teammate/roommate. Listened and validated Alta in her emotional experiences. Considered steps she can take cognitively with persepctive-taking and self-care, as well as coping skills when elements of life are not within her control. She asked about wanting to talk with Dr. Najera about her medication type/dose and described feeling as though she does not feel it is effective anymore. Encouraged her to meet with Dr. Najera to discuss her concerns and observations.      Coping skills, strengths and supports:   Communication skills, Exercise, Family support, Insight and sensitivity, Maturity and judgment, Motivation for change, Mandaen/spirituality , Socioeconomic stability, Social support system and Use of available services    Goals for counseling:  Ct reports that she does not \"handle the unknown very well\" and would like to get better at coping with uncertainty.1) \"Learn to embrace the present more and not worry about the future as much.\" 2) \"Learn ways to cope when I'm feeling anxious/panicky.\"    Clinical impressions or Other:  300.02 (F41.4) Generalized Anxiety Disorder  F.33 Major Depressive Disorder, recurrent     Therapy objectives/goals:  Assist with transition into college  Build resilience and response to adversity  Decrease anxiety symptoms  Decrease depressive symptoms  Decrease perceived stress  Enhance self-advocacy  Enhance self-care  Increase assertiveness  Increase mindfulness and the ability to be present  Increase self-awareness  Increase self-esteem and self-worth  Increase willingness to be vulnerable and experience emotions  Improve mood  Provide support  Teach and improve coping " skills    Therapy follow-up plan:  Individual counseling sessions weekly  Follow up with sports medicine physician  Participation with Learn to Live online mental health resource      Jag Ross, PhD LP, CMPC

## 2022-10-08 ENCOUNTER — OFFICE VISIT (OUTPATIENT)
Dept: ORTHOPEDICS | Facility: CLINIC | Age: 22
End: 2022-10-08
Payer: COMMERCIAL

## 2022-10-08 DIAGNOSIS — F41.8 DEPRESSION WITH ANXIETY: Primary | ICD-10-CM

## 2022-10-08 NOTE — LETTER
10/8/2022      RE: Manda Lincoln  [address]  [place] [address]     Dear Colleague,    Thank you for referring your patient, Manda Lincoln, to the [facility]. Please see a copy of my visit note below.    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 She has been taking lexapro 30 mg daily with good effect until the last couple of months where she has felt more depressed and anxious. She says this seems to happen where she starts taking the medicine and feels better, but then needs a higher dose as if she is building up a tolerance to the medication. Her current roommate/teammate no longer talks to her and they used to be very close, so this has been a big stressor for her. She ends up staying in her room alone a lot. She has started working with Dr. Chow again which has been helpful. She plans on starting the Learn to Live program. Denies SI/SH. Sleep ok. School going well.          Medications:     Current Outpatient Medications   Medication Sig     escitalopram (LEXAPRO) 10 MG tablet Take 1 tablet (10 mg) by mouth daily     escitalopram (LEXAPRO) 20 MG tablet Take 1 tablet (20 mg) by mouth daily     predniSONE (DELTASONE) 20 MG tablet Take 2 tablets (40 mg) by mouth daily for 5 days     No current facility-administered medications for this visit.             Allergies:      Allergies   Allergen Reactions     Amoxicillin      Penicillin G             Review of Systems:   A comprehensive 10 point review of systems (constitutional, ENT, cardiac, peripheral vascular, respiratory, GI, , Musculoskeletal, skin, Neurological) was performed and found to be negative except as described in this note.           Physical Exam:   COMPLETE  EXAMINATION:   VITAL SIGNS: There were no vitals taken for this visit.  GEN: Alert, well-nourished, and in no distress.   HEENT:   Head: Normocephalic and atraumatic.   Eyes: PERRLA, EOMI  NEURO: Alert and oriented to person, place, and time. Gait normal.   SKIN: No rash, warmth or erythema  PSYCH: Mood appears down, memory, affect and judgment normal.           Assessment:     Depression/Anxiety        Plan:     1. Discussed diagnosis and all treatment options.  2. Continue sports psych  3. She would like to continue Lexapro 30 mg daily for now, work with Dr. Chow and engage more with the Learn to Live program and monitor symptoms for now. We discussed option of changing to different selective serotonin reuptake inhibitor such as Prozac or switching to Wellbutrin. She will consider these options if symptoms fail to improve. Follow up in 2 weeks, sooner prn.    Karina Najera M.D.    DELMY Meredith was present for the entire visit.

## 2022-10-10 NOTE — PROGRESS NOTES
"[facility] ATHLETICS  [facility] follow-up note  Date of service performed: 10/8/2022    Concern/injury: Left hip  DOS: 5/17/2022    Alta texted me this morning saying her hips were very painful after the bike workout yesterday. We took her out of non-dresser workout today (4 corners) and she came into ATR. We discussed more what she is feeling in her left hip. She says her right hip feels 100%, but her left hip never felt 100%. She has had issues with her hip flexor/psoas since surgery, but cannot pinpoint exactly what is wrong. She is able to do a straight leg raise, but says she has discomfort in her hip flexor area. Compared to her right side, it is clear that her left side isn't as smooth. She says she noticed yesterday that her left hip \"slips out\". When asked to explain further, she said that when she juts her right hip out (leaning towards the left), it feels like there is a stopping point. But, when she juts her left hip out (leaning towards the right), it feels like it keeps going. She says she feels pain deep in the joint.    Her ROM and strength are good. She does have some limitation with hip flexion with SLR on the left side, but she is able to do one. She was unable before surgery to perform the exercise smoothly.     She did BFR rehab bilaterally:   - Bodyweight squats 30/15/15/15  - Glider 1/2 circles (pain with going into deeper single leg squat on left side) 30/15/15/15 (split in half each side)  - Step Up 30/15/15/15  - Glide Board 4x30 sec    Assessment/plan: Will contact Dr. Freed on Monday. At this time she can do activity as tolerated on the ice. She should modify in the weight room - limit hip flexion, running, jumping.    Kenya Meredith, [facility]      "

## 2022-10-10 NOTE — PROGRESS NOTES
"[facility] ATHLETICS  [facility] follow-up note  Date of service performed: 10/7/2022    Concern/injury: Left hip  DOS: 5/17/2022    Alta comes in today c/o left hip pain and something \"not feeling right\". She asked about getting an MRI, and I told her we would have her follow up with Dr. Freed.    Assessment/plan: Alat did not play in games this weekend and was a healthy scratch. She did a bike workout with other non-dressers today in weight room. 30 mins, keeping >100 rpm.    Kenya Meredith, ATC      "

## 2022-10-10 NOTE — PROGRESS NOTES
"[facility] ATHLETICS  [facility] follow-up note  Concern/injury: Bilateral hips post-op  DOS: Right: 4/7/2022; Left: 5/17/2022    Date of service performed: 9/26/2022  Alta did lift and then immediately did practice afterwards. After that, she rated her hip pain as 7-8/10.     Date of service performed: 9/27/2022  Alta reports her hips are feeling better today. R>L groin tightness. Heat before practice; ice/stim after.    Date of service performed: 9/28/2022  Bilateral groins painful after lift & skate.   Treatment: cupping on quads, dry needle B groin/lateral hip (6 needles 2\" 10 mins)    Date of service performed: 9/30/2022  Alta played in exhibition game vs. [employer] today.  "

## 2022-10-10 NOTE — PROGRESS NOTES
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 She has been taking lexapro 30 mg daily with good effect until the last couple of months where she has felt more depressed and anxious. She says this seems to happen where she starts taking the medicine and feels better, but then needs a higher dose as if she is building up a tolerance to the medication. Her current roommate/teammate no longer talks to her and they used to be very close, so this has been a big stressor for her. She ends up staying in her room alone a lot. She has started working with Dr. Chow again which has been helpful. She plans on starting the Learn to Live program. Denies SI/SH. Sleep ok. School going well.          Medications:     Current Outpatient Medications   Medication Sig     escitalopram (LEXAPRO) 10 MG tablet Take 1 tablet (10 mg) by mouth daily     escitalopram (LEXAPRO) 20 MG tablet Take 1 tablet (20 mg) by mouth daily     predniSONE (DELTASONE) 20 MG tablet Take 2 tablets (40 mg) by mouth daily for 5 days     No current facility-administered medications for this visit.             Allergies:      Allergies   Allergen Reactions     Amoxicillin      Penicillin G             Review of Systems:   A comprehensive 10 point review of systems (constitutional, ENT, cardiac, peripheral vascular, respiratory, GI, , Musculoskeletal, skin, Neurological) was performed and found to be negative except as described in this note.           Physical Exam:   COMPLETE EXAMINATION:   VITAL SIGNS: There were no vitals taken for this visit.  GEN: Alert, well-nourished, and in no distress.   HEENT:   Head: Normocephalic and atraumatic.   Eyes: PERRLA, EOMI  NEURO: Alert and oriented to person, place, and time. Gait normal.    SKIN: No rash, warmth or erythema  PSYCH: Mood appears down, memory, affect and judgment normal.           Assessment:     Depression/Anxiety        Plan:     1. Discussed diagnosis and all treatment options.  2. Continue sports psych  3. She would like to continue Lexapro 30 mg daily for now, work with Dr. Chow and engage more with the Learn to Live program and monitor symptoms for now. We discussed option of changing to different selective serotonin reuptake inhibitor such as Prozac or switching to Wellbutrin. She will consider these options if symptoms fail to improve. Follow up in 2 weeks, sooner prn.    Karina Najera M.D.    DELMY Meredith was present for the entire visit.

## 2022-10-10 NOTE — PROGRESS NOTES
"[facility] ATHLETICS  [facility] ATC follow-up note  Date of service performed: 10/10/2022    Concern/injury: Left hip  DOS: 5/17/2022    I texted Dr. Freed today and talked to his PA, Eduardo, on the phone about Alta's left hip pain. Plan to get her in to see Eduardo tomorrow since Dr. Freed is out of town. Will do x-rays at the appointment.     Alta did practice today and do speed circuit in weight room. After practice did dry needling (4 needles 2\") in her hip flexor/groin and did stim for 12 mins. She liked this treatment.     Kenya Meredith, ATC      "

## 2022-10-10 NOTE — PROGRESS NOTES
[facility] ATHLETICS  [facility] ATC follow-up note  Concern/injury: Bilateral hip post-op  DOS: Right: 4/7/2022; Left: 5/17/2022    Date of service performed: 9/12/2022  Alta will remain in non-contact jersey for practices this week, as team gets to work with coaches. Will allow her full contact when she feels more confident. She has full strength and ROM of both hips. She does notice some discomfort in her left hip flexor. Dry needling hip adductor/hip flexor/groin helps this the most. She heats prior to practices but has stopped daily rehab. She does do some ROM/CARS prior to practice and does some maintenance rehab (clamshells, lateral band walks, monster walks) occasionally.     Date of service performed: 9/14/2022  Treatment: dry needle B hip flexors, pectineus, quad. Great response to pectineus in left hip.    Date of service performed: 9/15/2022  Treatment: B quads cupping, soft tissue masage    Date of service performed: 9/16/2022  Treatment: dry needle B hip flexors, pectineus, quad. Great response to pectineus in left hip.    Alta did complete full practice (non-contact) the entire week. She is still modified in weight room - limiting jumping, running, and hip flexion.      Kenya Meredith, ATC

## 2022-10-10 NOTE — PROGRESS NOTES
[facility] ATHLETICS  [facility] follow-up note  Date of service performed: 8/15-8/19/2022    Concern/injury: Bilateral hip post-op  DOS: Right: 4/7/2022; Left: 5/17/2022    MONDAY TUESDAY WEDNESDAY THURSDAY FRIDAY   8/15 8/16 8/17 8/18 8/19     RIGHT:  ? Single leg leg press 3x12  ? Assisted single leg squat 3x6  ? Assisted reverse lunge 2x8  ? Antirotational squat 2x10 each direction  ? Lateral band walk 2x  ? Monster walk 2x  ? Eccentric 2-1 calf raises 3x15  ? Single leg balance on BOSU 2x40 seconds      LEFT:   ? Single leg leg press 3x12  ? Assisted single leg squat 3x6  ? Assisted reverse lunge 2x8  ? Antirotational squat 2x10 each direction  ? Glider stride 2x12 each  ? Deadlift 2x15  ? Eccentric 2-1 calf raises 3x15  ? Single leg balance on BOSU 2x40 seconds     RIGHT BFR:  ? [place] ball hamstring iso curl 30/15/15/15  ? Single leg 3-way tap 15/10/10/10  ? Curtsy lunge 30/15/15/15  ? Standing fire hydrant 30/15/15/15      LEFT BFR:  ? [place] ball hamstring iso curl 30/15/15/15  ? Glider -   [place] 20/15/15/15  ? Glider stride lunge 15/10/10/10  ? Standing fire hydrant 30/15/15/15 Dr. Freed appointment      RIGHT:  ? Single leg leg press hops 3x15  ? Single leg 3-way taps 3x8  ? Hamstring tantrum 3x30 sec  ? Assisted reverse lunge 2x15  ? Curtsy lunge 2x10  ? Eccentric 2-1 calf raise 3x15  ? Skater hops 3x10  ? Single leg hops forwards/backwards 2x20 seconds  ? Single leg hops side to side 2x20 seconds  ? Single leg hop forward for controlled landing x8 total    LEFT:   ? Single leg leg press hop 3x10  ? Single leg 3-way taps 3x8  ? Assisted squat to box 3x8  ? Assisted reverse lunge 2x8  ? Curtsy lunge 2x10  ? Single leg cross body reach 2x8  ?  Eccentric 2-1 calf raises  ? Single leg balance on slant board 2x30 seconds each direction   RIGHT BFR:  ? Single leg leg press hop 30/15/15/15 (go to 2-1 or double leg leg press when fatigued)  ? Sidelying rainbows 30/15/15/15  ? Armond hansen  30/15/15/15  ? Antirotational squat 20/10/10/10      LEFT BFR:  ? [place] Island[place] split squat 20/10/10/10  ? Stool scoots 30/15/15/15  ? Calf raises 30/15/15/15  ? Assisted single leg squat to seat 15/8/8/8     RIGHT:   ? Single leg leg press hops 3x15  ? Lateral lunge 2x10  ? Single leg hops forwards/backwards 2x20 seconds  ? Single leg side to side hops 2x20 seconds  ? Single leg hop forward for controlled landing x10 total  ? Single leg box jumps 3x5  ? Calf raises 2x20  ? Cup  2x8      LEFT:  ? Single leg leg press hops 3x15  ? Lateral lunge 2x10  ? Double leg hops forwards 4x6  ? Double leg box jump 3x5  ? Double leg lateral hops 3x15 seconds  ? Double leg forward/backward hops 3x15 seconds  ? Cup  2x8         Kenya Meredith, ATC

## 2022-10-10 NOTE — PROGRESS NOTES
"[facility] ATHLETICS  [facility] ATC follow-up note  Date of service performed: 9/7/2022    Concern/injury: Bilateral hip post op   DOS: Right: 4/7/2022; Left: 5/17/2022    Alons hips have been doing well overall. She has been diligent with her rehab, doing 5 days per week of rehab. Her ROM and strength have returned to above pre-surgery measurements. She is now able to do hip flexion and straight leg raises without any pain. She does still notice some \"discomfort\" in her left hip, especially around her hip flexor and adductor. We have been doing dry needling to both her hip flexor and adductor/groin. She notices much improvement especially after dry needling her groin. She has been skating 2-3x/week and doing well. She has not done more than 45-60 minutes at a time. We have discussed we may need to modify her starting out with practices, as time and intensity will increase.     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 We will allow her to do a full practice but will take her out early or give her an extra rest day if needed.     Kenya Meredith, ATC      "

## 2022-10-10 NOTE — PROGRESS NOTES
"[facility] ATHLETICS  [facility] ATC follow-up note  Concern/injury: Bilateral hips post-op  DOS: Right: 4/7/2022; Left: 5/17/2022    Date of service performed: 9/19/2022  Alta practiced full contact today and did well.   Treatment: bilateral quad cupping, bilateral hip combo prior to practice.    Date of service performed: 9/20/2022  Treatment: Bilateral quad/adductor needling (Five 2\" needles each side for 10 mins) post practice    Date of service performed: 9/21/2022  Treatment: heat, bilateral hip combo    Date of service performed: 9/22/2022  Treatment: Bilateral hip combo 5 mins each before practice; Bilateral quad/pectinues dry needling (six 2\" needles each side for 10 mins) post practice    Date of service performed: 9/23/2022  Treatment: Bilateral hip combo 5 mins each before practice    Date of service performed: 9/24/2022  Alta did play in exhibition game today vs [facility] with no issues with either hip.   Treatment: Bilateral hip combo 5 mins each before exhibition game.        Kenya Meredith, ATC      "

## 2022-10-10 NOTE — PROGRESS NOTES
[facility] ATHLETICS  [facility] follow-up note  Date of service performed: 8/22-8/26/2022    Concern/injury: Bilateral hip post op  DOS: Right: 4/7/2022; Left: 5/17/2022    Alta continues to skate on her own 2x/week, not on back to back days. She has noticed some soreness after skating, but this does not last long. She is slowly increasing her time on ice, doing about 25-30 mins per day. Overall, she is doing well.    MONDAY TUESDAY WEDNESDAY THURSDAY FRIDAY 8/22 8/23 8/24 8/25 8/26     RIGHT:  ? Single leg leg press 3x12  ? Assisted single leg squat 3x6  ? Assisted reverse lunge 2x8  ? Antirotational squat 2x10 each direction  ? Lateral band walk 2x  ? Monster walk 2x  ? Eccentric 2-1 calf raises 3x15  ? Single leg balance on BOSU 2x40 seconds      LEFT:   ? Single leg leg press 3x12  ? Assisted single leg squat 3x6  ? Assisted reverse lunge 2x8  ? Antirotational squat 2x10 each direction  ? Glider stride 2x12 each  ? Deadlift 2x15  ? Eccentric 2-1 calf raises 3x15  ? Single leg balance on BOSU 2x40 seconds     RIGHT BFR:  ? [place] ball hamstring iso curl 30/15/15/15  ? Single leg 3-way tap 15/10/10/10  ? Curtsy lunge 30/15/15/15  ? Standing fire hydrant 30/15/15/15      LEFT BFR:  ? [place] ball hamstring iso curl 30/15/15/15  ? Glider -   [place] 20/15/15/15  ? Glider stride lunge 15/10/10/10  ? Standing fire hydrant 30/15/15/15   RIGHT:   ? Single leg leg press hops 3x15  ? Lateral lunge 2x10  ? Single leg hops forwards/backwards 2x20 seconds  ? Single leg side to side hops 2x20 seconds  ? Single leg hop forward for controlled landing x10 total  ? Single leg box jumps 3x5  ? Calf raises 2x20  ? Cup  2x8      LEFT:  ? Single leg leg press hops 3x15  ? Lateral lunge 2x10  ? Single leg hop forward for controlled landing x10 total  ? Double leg box jump 3x5  ? Single leg lateral hops 3x15 seconds  ? Single leg forward/backward hops 3x15 seconds  ? Cup  2x8   Mobility     RIGHT BFR:   ?  Reverse Lunge on Box 20/10/10/10  ? Tantrums (double leg) 30/15/15/15  ? Curtsy lunge 30/15/15/15  ? 2-1 leg press 30/15/15/15    RIGHT WITHOUT BFR:  ? Single leg side to side hops 2x20 seconds  ? Single leg hop forward for controlled landing x10 total  ? Single leg box jumps 3x5      LEFT BFR:  ? Assisted single leg squat to box 15/8/8/8  ? Hyperextensions (no band) 30/15/15/15  ? Curtsy lunge 30/15/15/15  ? 2-1 leg press 30/15/15/15        LEFT WITHOUT BFR  ? Single leg hops side to side 3x10 seconds  ? Single leg hops forwards/backwards 3x10 seconds  ? Single leg hop forward for controlled landing x10 total  ? Single leg box jumps 3x5           Kenya Meredith, ATC

## 2022-10-19 ENCOUNTER — DOCUMENTATION ONLY (OUTPATIENT)
Dept: FAMILY MEDICINE | Facility: CLINIC | Age: 22
End: 2022-10-19

## 2022-10-19 NOTE — PROGRESS NOTES
[facility] ATHLETICS  [facility] initial assessment note  Date of service performed: 10/19/2022    Concern: Head injury  Body part: Head  Description: Concussion  Injury: Concussion  Type: Athletics related  Date of injury: 10/19/2022    S: Alta was participating in practice today when she got hit in the left side of the face/neck by a teammate falling on her. She says teammate came from the left side (which she has no peripheral vision in) so she did not see her coming. She was hit in the let jaw/neck, and her head whipped to the right. She does not think her head hit the ice. She was able to get off the ice on her own. She was tearful in the ATR during evaluation. She previously had a concussion in March 2022 that took about 1 month to recover from. She is currently taking Lexapro 30 mg for anxiety and depression and seeing Dr. Chow in sports psych regularly.    O: Tenderness over left SCM, left TMJ. No tenderness over c-spine. Full cervical ROM with pain on left side when turning head towards right.     Symptom Inventory:   Headache = 2  Pressure in head = 2  Neck Pain = 3/4  Nausea/Vomiting = 0  Dizziness = 0  Blurred vision = 0  Balance problems = 0  Sensitivity to light = 1  Sensitivity to noise = 2  Feeling slowed down = 2  Feeling in a fog = 2  Don't feel right = 2  Difficulty concentrating = 1  Difficulty remembering = 0  Fatigue/low energy = 0  Confusion = 0  Drowsiness = 0  More emotional = 1  Irritability = 0  Sadness = 0  Nervous/anxious = 0  Trouble falling asleep = 0    Balance: WNL  Convergence: WNL  VOMS: Smooth Pursuit: WNL  Saccades horizontal: WNL  Saccades vertical: WNL  VOR horizontal: dizziness + pain in back of neck  VOR vertical: slight dizziness      A: Concussion    P: Pulled from practice and did not continue physical activity. Her and roommate given home instructions. Will not go to class today. RTL letter level 3 sent out to , athlete, coaches, and team  physician. Will have her see physician for evaluation on Friday. Will begin RTP protocol once symptoms subside. [facility] concussion management plan is being followed.    Kenya Meredith, ATC

## 2022-10-20 NOTE — PROGRESS NOTES
[facility] ATHLETICS  [facility] AT[facility] follow-up note  Date of service performed: 10/20/2022    Concern/injury: Concussion    Alta comes in today for check in. She did go to class this morning. She said that the screens may have caused a slight increase in symptoms but did not feel too bad. She reports continued neck pain L>R. She said she feels that her talking is slowed down and it takes her longer to come up with the words. I noticed that she seems more reserved but speech seemed normal. She was able to sleep last night. She did not take any tylenol or other medication to help with neck soreness.    SCAT5 performed today.   Symptom Inventory:   Headache = 3  Pressure in head = 2  Neck Pain = 2/3  Nausea/Vomiting = 0  Dizziness = 1  Blurred vision = 0  Balance problems = 0/1  Sensitivity to light = 2  Sensitivity to noise = 1  Feeling slowed down = 2  Feeling in a fog = 2  Don't feel right = 2  Difficulty concentrating = 0  Difficulty remembering = 0/1  Fatigue/low energy = 2  Confusion = 0  Drowsiness = 0  More emotional = 0  Irritability = 1  Sadness = 0  Nervous/anxious = 0  Trouble falling asleep = 0    Total symptoms: 13 (up from 10 yesterday)  Severity score: 23 (up from 19 yesterday)    Cognitive screening:  Orientation: 5/5  Immediate memory: 5/5  Concentration: 5/5    Neuro exam: WNL  Balance: 0 errors  Delayed recall: 3/5    Assessment/plan: Concussion, slightly worsening symptoms compared to yesterday.    Treatment: heat pack, soft tissue work to neck and base of skull. This improved pain and ROM of the neck.     Stay level 3 of RTL. Not cleared for any activity at this time. She did not attend practice due to sensitivity to light and noise.     She will see Dr. Morales tomorrow for initial physician visit and follow up with Dr. Najera at home game on Saturday as well. Discussed with Dr. Najera and Dr. Coon. Will keep an eye on mental health. Will wait to have her see Dr. Mc until  at least 1 week post injury to let symptoms resolve. She was trying to get in to Dr. 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     [facility] concussion management plan is being followed.    Kenya Meredith, ATC

## 2022-10-21 ENCOUNTER — OFFICE VISIT (OUTPATIENT)
Dept: FAMILY MEDICINE | Facility: CLINIC | Age: 22
End: 2022-10-21
Payer: COMMERCIAL

## 2022-10-21 VITALS
BODY MASS INDEX: 26.26 KG/M2 | SYSTOLIC BLOOD PRESSURE: 136 MMHG | HEART RATE: 88 BPM | HEIGHT: 69 IN | DIASTOLIC BLOOD PRESSURE: 77 MMHG | WEIGHT: 177.3 LBS

## 2022-10-21 DIAGNOSIS — S06.0X0A CONCUSSION WITHOUT LOSS OF CONSCIOUSNESS, INITIAL ENCOUNTER: Primary | ICD-10-CM

## 2022-10-21 NOTE — LETTER
"  10/21/2022      RE: Manda Lincoln  [address]  [place] [address]     Dear Colleague,    Thank you for referring your patient, Manda Lincoln, to the [facility] STUDENT ATHLETIC CLINIC. Please see a copy of my visit note below.    SUBJECTIVE:  Manda Lincoln is a 22 year old female who is seen for a concussion  Had a concussion earlier this year in March that took about a month to resolve  History of prolonged recovery from concussions  Hit during hockey on left side of face/neck on helmet  Occurred on 10/19/22.  Continues to have symptoms    Symptoms at this visit:  # symptoms=10/22  Severity symptoms=15/132 on SCAT        Patient's past medical, surgical, social and family histories reviewed:  No significant medical history, and history of mental health disorders see notes      REVIEW OF SYSTEMS:  CONSTITUTIONAL:NEGATIVE for fever, chills, change in weight  INTEGUMENTARY/SKIN: NEGATIVE for worrisome rashes, moles or lesions  MUSCULOSKELETAL:See HPI above  NEURO: NEGATIVE for weakness, dizziness or paresthesias      /77   Pulse 88   Ht 1.753 m (5' 9\")   Wt 80.4 kg (177 lb 4.8 oz)   BMI 26.18 kg/m          EXAM:  GENERAL APPEARANCE: healthy, alert and no distress   SKIN: no suspicious lesions or rashes  NEURO: Normal strength and tone, mentation intact and speech normal  PSYCH:  mentation appears normal and affect normal/bright    HEENT:    Tympanic Membranes:Pearly  External Ear Canal:Normal  Oropharynx:Atraumatic  Reflexes: Normal  NECK:  supple, non-tender, FULL ROM    Neurologic:  Cranial Nerves 2-12:  intact  ANTONI:Yes  EOMI:Yes  Nystagmus: NO    Painful Eye movements: No  Convergence Testing: Normal (</= 6 cm)  Visual Field Testing: normal    Strength:  Shoulder shrug (C5):5/5  Deltoid (C5): 5/5  Bicep (C6):5/5  Wrist Extension (C6): 5/5  Tricep (C7):5/5  Wrist Flexion (C7): 5/5  Finger Flexion (C8/T1):5/5      ASSESSMENT/PLAN  21 yo female with history of concussions, " recent concussion, new symptoms    Concussion.  Recommended rest from physical and cognitive activities. We discussed in depth what patient should avoid. Discussed worrisome signs and symptoms and reasons to go to the ED  F/u with ATC and primary care team within the week  Cont. [facility] concussion protocol        Again, thank you for allowing me to participate in the care of your patient.      Sincerely,    Alexandre Morales MD

## 2022-10-21 NOTE — PROGRESS NOTES
"SUBJECTIVE:  Manda Lincoln is a 22 year old female who is seen for a concussion  Had a concussion earlier this year in March that took about a month to resolve  History of prolonged recovery from concussions  Hit during hockey on left side of face/neck on helmet  Occurred on 10/19/22.  Continues to have symptoms    Symptoms at this visit:  # symptoms=10/22  Severity symptoms=15/132 on SCAT        Patient's past medical, surgical, social and family histories reviewed:  No significant medical history, and history of mental health disorders see notes      REVIEW OF SYSTEMS:  CONSTITUTIONAL:NEGATIVE for fever, chills, change in weight  INTEGUMENTARY/SKIN: NEGATIVE for worrisome rashes, moles or lesions  MUSCULOSKELETAL:See HPI above  NEURO: NEGATIVE for weakness, dizziness or paresthesias      /77   Pulse 88   Ht 1.753 m (5' 9\")   Wt 80.4 kg (177 lb 4.8 oz)   BMI 26.18 kg/m          EXAM:  GENERAL APPEARANCE: healthy, alert and no distress   SKIN: no suspicious lesions or rashes  NEURO: Normal strength and tone, mentation intact and speech normal  PSYCH:  mentation appears normal and affect normal/bright    HEENT:    Tympanic Membranes:Pearly  External Ear Canal:Normal  Oropharynx:Atraumatic  Reflexes: Normal  NECK:  supple, non-tender, FULL ROM    Neurologic:  Cranial Nerves 2-12:  intact  ANTONI:Yes  EOMI:Yes  Nystagmus: NO    Painful Eye movements: No  Convergence Testing: Normal (</= 6 cm)  Visual Field Testing: normal    Strength:  Shoulder shrug (C5):5/5  Deltoid (C5): 5/5  Bicep (C6):5/5  Wrist Extension (C6): 5/5  Tricep (C7):5/5  Wrist Flexion (C7): 5/5  Finger Flexion (C8/T1):5/5      ASSESSMENT/PLAN  21 yo female with history of concussions, recent concussion, new symptoms    Concussion.  Recommended rest from physical and cognitive activities. We discussed in depth what patient should avoid. Discussed worrisome signs and symptoms and reasons to go to the ED  F/u with ATC and primary care team " within the week  Cont. [facility] concussion protocol

## 2022-10-24 NOTE — PROGRESS NOTES
[facility] ATHLETICS  Eliel ATC follow-up note  Date of service performed: 10/24/2022    Concern/injury: Beryl Holm comes in today for follow up. She attended team meal on Friday and Saturday but did not travel Friday and did not attend home game on Saturday due to symptoms. She did come in during first intermission to see Dr. Coon. This appointment was not completed due to urgent medical matter with a teammate, so she will finish appointment on Tuesday.     She reports she feels headache is getting worse, but other symptoms are staying about the same. She has been napping longer than normal, but sleep at night is normal (about 8 hours). She has not had any class since Thursday and has not had any homework. She does find that computer screens increase symptoms, but using her phone with a blue light background does not. She is able to read a physical book with no increase in symptoms. Light and noise still cause symptoms.     Symptom Inventory:   Headache = 3  Pressure in head = 2  Neck Pain = 3  Nausea/Vomiting = 0  Dizziness = 0  Blurred vision = 0  Balance problems = 0  Sensitivity to light = 1  Sensitivity to noise = 1  Feeling slowed down = 2  Feeling in a fog = 2  Don't feel right = 2  Difficulty concentrating = 0  Difficulty remembering = 1  Fatigue/low energy = 0  Confusion = 0  Drowsiness = 0  More emotional = 0  Irritability = 1  Sadness = 0  Nervous/anxious = 0  Trouble falling asleep = 0     Total symptoms: 10 (down from 13)  Severity score: 18 (down from 23)       Assessment/plan: Concussion. She will attend class tomorrow. Will remain at RTL level 3 at this time. Did allow her to ride the bike in ATR very light, not increasing HR, for 15 minutes. This did cause some dizziness and was discontinued as symptoms increased. She is not cleared for any RTP activity yet. She will see Dr. Coon tomorrow afternoon to finish appointment from Saturday. [facility] concussion management plan is  being followed.    Kenya Meredith, ATC

## 2022-10-24 NOTE — PROGRESS NOTES
[facility] ATHLETICS  Eliel ATC follow-up note  Date of service performed: 10/21/2022    Concern/injury: Concussion    Alta saw Dr. Morales today for evaluation. She continues to have moderate symptoms. Worse symptoms are neck pain and headache in back of head.     Assessment/plan: Ongoing concussion. Remain at RTL level 3. No activity until symptoms begin to resolve. Will transfer care to Dr. Coon who managed her previous concussion. [facility] concussion management plan is being followed.    Kenya Meredith, ATC

## 2022-10-25 ENCOUNTER — OFFICE VISIT (OUTPATIENT)
Dept: FAMILY MEDICINE | Facility: CLINIC | Age: 22
End: 2022-10-25
Payer: COMMERCIAL

## 2022-10-25 VITALS
WEIGHT: 177 LBS | BODY MASS INDEX: 26.22 KG/M2 | HEART RATE: 63 BPM | SYSTOLIC BLOOD PRESSURE: 119 MMHG | HEIGHT: 69 IN | DIASTOLIC BLOOD PRESSURE: 71 MMHG

## 2022-10-25 DIAGNOSIS — S06.0X0A CONCUSSION WITHOUT LOSS OF CONSCIOUSNESS, INITIAL ENCOUNTER: Primary | ICD-10-CM

## 2022-10-25 NOTE — LETTER
"  10/25/2022      RE: Manda Lincoln  [address]  [place] [address]     Dear Colleague,    Thank you for referring your patient, Manda Lincoln, to the [facility] STUDENT ATHLETIC CLINIC. Please see a copy of my visit note below.    [facility] Athletic Medicine Clinic            SUBJECTIVE:     Manda Lincoln is a 22 year old female presenting to clinic today for subsequent concussion eval.     SCAT today and on history reveals still with mild headache, fogginess, dizziness. Now one week out from concussion. Still on stage three for return to learn, ut school has not been overly challenging to keep up with assignments. Question regarding how many concussive events \"are allowed\" till one might have to consider retiring from sport.    PMH, Medications and Allergies were reviewed and updated as needed.    ROS:  As noted above otherwise negative.    There is no problem list on file for this patient.      Current Outpatient Medications   Medication Sig Dispense Refill     escitalopram (LEXAPRO) 10 MG tablet Take 1 tablet (10 mg) by mouth daily 90 tablet 3     escitalopram (LEXAPRO) 20 MG tablet Take 1 tablet (20 mg) by mouth daily 90 tablet 1              OBJECTIVE:     Vitals:   Vitals:    10/25/22 1400   BP: 119/71   Pulse: 63   Weight: 80.3 kg (177 lb)   Height: 1.753 m (5' 9\")     BMI: Body mass index is 26.14 kg/m .    Gen:  Well nourished and in no acute distress  HEENT: Extraocular movement intact. TM in tact. Mild swelling over left cheekbone.  Neck: supple  Skin: No rash, erythema, ecchymosis or obvious abnormality  Psych: Euthymic   Neuro: Alert and oriented.    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  Facial sensation symmetric.   Visual field diminished on left (baseline)  No proptosis, TTP, edema, ptosis.  No Afferent pupillary defect b/l  No strabismus  Funduscopic exam " unremarkable.  VOMS b/l HA: 5, Dizzy 2, Nausea 0, Foggy 4  Unchanged with smooth pursuits.  Saccade Horiz: 4,2,0,5  Saccade Vert: 5,3,0,4  VOR Horiz: 5,5,0,4  VOR Vert: 5,5,0,4  Visual motor: 5,6,0,5  Accomodation: 6 cm, 5 cm, 5 cm. Sx 5,0,0,5            ASSESSMENT & PLAN:      There are no diagnoses linked to this encounter.    21 yo  with concussion 1 week ago presenting for re-eval/sx check. Main symptoms currently are headache, fogginess, dizziness, and difficulty concentrating. Still with some worsening of symptoms with voms with high baseline rating, though eye movements are appropriate. Attempted biking yesterday but unable to complete due to symptoms. Will re-attempt tomorrow. Counseled on no set number of concussions before should consider long term, but rather length of recovery and frequency of concussions. Will also refer to neuro-optometry given significant visual changes one week post concussion and previous concussion hx with neuro-ocular dysfunction. .    - continue concussion protocol  - refer to neurooptometry  - re-eval in one week  -Continue with RTL Level 3    The patient was managed according to the  Athletic Medicine Concussion Management Protocol.      Options for treatment and/or follow-up care were reviewed with the patient and , Kenya Meredith. Patient was actively involved in the decision making process. Patient verbalized understanding and was in agreement with the plan.    Matt Renner DO  Primary Care Sports Medicine Fellow  Department of Family Medicine and [facility]    Attending Note:   I have examined this patient and have reviewed the clinical presentation and progress note with the fellow. I agree with the treatment plan as outlined. The plan was formulated with the fellow on the day of the patient's visit.   Lacey Coon MD, CAQ, CCD  [facility]  Sports Medicine and [facility]

## 2022-10-26 NOTE — PROGRESS NOTES
"[facility] Athletic Medicine Clinic            SUBJECTIVE:     Manda Lincoln is a 22 year old female presenting to clinic today for subsequent concussion eval.     SCAT today and on history reveals still with mild headache, fogginess, dizziness. Now one week out from concussion. Still on stage three for return to learn, ut school has not been overly challenging to keep up with assignments. Question regarding how many concussive events \"are allowed\" till one might have to consider retiring from sport.    PMH, Medications and Allergies were reviewed and updated as needed.    ROS:  As noted above otherwise negative.    There is no problem list on file for this patient.      Current Outpatient Medications   Medication Sig Dispense Refill     escitalopram (LEXAPRO) 10 MG tablet Take 1 tablet (10 mg) by mouth daily 90 tablet 3     escitalopram (LEXAPRO) 20 MG tablet Take 1 tablet (20 mg) by mouth daily 90 tablet 1              OBJECTIVE:     Vitals:   Vitals:    10/25/22 1400   BP: 119/71   Pulse: 63   Weight: 80.3 kg (177 lb)   Height: 1.753 m (5' 9\")     BMI: Body mass index is 26.14 kg/m .    Gen:  Well nourished and in no acute distress  HEENT: Extraocular movement intact. TM in tact. Mild swelling over left cheekbone.  Neck: supple  Skin: No rash, erythema, ecchymosis or obvious abnormality  Psych: Euthymic   Neuro: Alert and oriented.    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  Facial sensation symmetric.   Visual field diminished on left (baseline)  No proptosis, TTP, edema, ptosis.  No Afferent pupillary defect b/l  No strabismus  Funduscopic exam unremarkable.  VOMS b/l HA: 5, Dizzy 2, Nausea 0, Foggy 4  Unchanged with smooth pursuits.  Saccade Horiz: 4,2,0,5  Saccade Vert: 5,3,0,4  VOR Horiz: 5,5,0,4  VOR Vert: 5,5,0,4  Visual motor: 5,6,0,5  Accomodation: 6 cm, 5 cm, 5 cm. Sx 5,0,0,5            ASSESSMENT & " PLAN:      There are no diagnoses linked to this encounter.    23 yo  with concussion 1 week ago presenting for re-eval/sx check. Main symptoms currently are headache, fogginess, dizziness, and difficulty concentrating. Still with some worsening of symptoms with voms with high baseline rating, though eye movements are appropriate. Attempted biking yesterday but unable to complete due to symptoms. Will re-attempt tomorrow. Counseled on no set number of concussions before should consider FCI, but rather length of recovery and frequency of concussions. Will also refer to neuro-optometry given significant visual changes one week post concussion and previous concussion hx with neuro-ocular dysfunction. .    - continue concussion protocol  - refer to neurooptometry  - re-eval in one week  -Continue with RTL Level 3    The patient was managed according to the  Athletic Medicine Concussion Management Protocol.      Options for treatment and/or follow-up care were reviewed with the patient and , Keyna Meredith. Patient was actively involved in the decision making process. Patient verbalized understanding and was in agreement with the plan.    Matt Renner DO  Primary Care Sports Medicine Fellow  Department of Family Medicine and [facility]  [facility]

## 2022-10-29 NOTE — PROGRESS NOTES
Attending Note:   I have examined this patient and have reviewed the clinical presentation and progress note with the fellow. I agree with the treatment plan as outlined. The plan was formulated with the fellow on the day of the patient's visit.   Lacey Coon MD, CAQ, CCD  [facility]  Sports Medicine and Bone Health

## 2022-11-02 NOTE — PROGRESS NOTES
[facility] ATHLETICS  Eliel ATC follow-up note  Date of service performed: 11/1/2022    Concern/injury: Beryl Holm comes in today for follow up. She did not travel to [facility] with the team. She reports she woke up Friday with a headache that did not improve so she did not bike. She says Saturday she felt better so she rode the bike at a light level for 20 minutes in her apartment complex gym. She said her boyfriend was in there working out as well, and moving her head while on the bike did increase symptoms a little bit.     She reports classes are going well and she needs no accommodations. She continues to have neck tightness, headache, and fogginess.    Symptom Inventory:   Headache = 1  Pressure in head = 2  Neck Pain = 2  Nausea/Vomiting = 0  Dizziness = 0  Blurred vision = 0  Balance problems = 0  Sensitivity to light = 1  Sensitivity to noise = 1  Feeling slowed down = 1  Feeling in a fog = 1  Don't feel right = 1  Difficulty concentrating = 0  Difficulty remembering = 1  Fatigue/low energy = 1  Confusion = 0  Drowsiness = 0  More emotional = 0  Irritability = 1  Sadness = 0  Nervous/anxious = 0  Trouble falling asleep = 0     Total symptoms: 11 (down from 12)  Severity score: 12 (down from 19)    Assessment/plan: Beryl Holm rode the bike for 20 mins with BFR today. She tolerated that well. She had an appointment with Dr. Mc in the afternoon. He prescribed her new glasses.     Moved to level 5 of RTL. Email sent out to appropriate staff members.     Continue light activity, and progress through RTP protocol once symptoms start to improve. Will do bike again tomorrow if her symptoms tolerate it.     Kenya Meredith, ATC

## 2022-11-02 NOTE — PROGRESS NOTES
[facility] ATHLETICS  Eliel ATC follow-up note  Date of service performed: 11/2/2022    Concern/injury: Concussion    Alta comes in today for check in. She reports that yesterday after her appointment with Dr. Mc her symptoms increased a great deal. She is feeling better today. Still has neck pain and fogginess.    Assessment/plan: Biked for 15 minutes light level - symptoms remained the same. Massage neck/back of head. [facility] Concussion management plan is being followed.    Kenya Meredith, ATC

## 2022-11-02 NOTE — PROGRESS NOTES
[facility] ATHLETICS  [facility] ATC follow-up note  Date of service performed: 10/25/2022    Concern/injury: Concussion    Alta saw Dr. Coon today for follow up. She continues to have mild symptoms, mainly headache, neck pain, fogginess, feeling slowed down. She has not been taking any pain medication because she wants to know how her symptoms really are.     Assessment/plan: Concussion.     She remains at RTL level 3. She remains at level 1 of RTP - just leisurely bike in ATR under supervision of ATC.     Refer to Dr. Rivera Mc now that it has been 1 week post-injury. Will email him to get an appointment scheduled. [facility] concussion management plan is being followed.    Kenya Meredith, ATC

## 2022-11-02 NOTE — PROGRESS NOTES
[facility] ATHLETICS  [facility] ATC follow-up note  Date of service performed: 10/26/2022    Concern/injury: Concussion    Alta comes in today for biking and follow up.     Symptom Inventory:   Headache = 2  Pressure in head = 2  Neck Pain = 2  Nausea/Vomiting = 0  Dizziness = 0  Blurred vision = 0  Balance problems = 0  Sensitivity to light = 1  Sensitivity to noise = 2  Feeling slowed down = 1  Feeling in a fog = 2  Don't feel right = 2  Difficulty concentrating = 0  Difficulty remembering = 1  Fatigue/low energy = 2  Confusion = 0  Drowsiness = 2  More emotional = 0  Irritability = 1  Sadness = 0  Nervous/anxious = 0  Trouble falling asleep = 0     Total symptoms: 12 (up from 10)  Severity score: 19 (up from 18)    She biked today for 20 minutes with light resistance. Symptoms increased 1-2 points on the symptom scale during biking.     Assessment/plan: Continue following [facility] concussion management plan. Will try biking again on Friday if her symptoms stay the same or improve. She is not traveling with team to [facility] this weekend so will go into [facility] for biking on Friday.     Kenya Meredith, ATC

## 2022-11-08 ENCOUNTER — OFFICE VISIT (OUTPATIENT)
Dept: FAMILY MEDICINE | Facility: CLINIC | Age: 22
End: 2022-11-08
Payer: COMMERCIAL

## 2022-11-08 VITALS
DIASTOLIC BLOOD PRESSURE: 72 MMHG | BODY MASS INDEX: 26.22 KG/M2 | HEART RATE: 67 BPM | SYSTOLIC BLOOD PRESSURE: 115 MMHG | HEIGHT: 69 IN | WEIGHT: 177 LBS

## 2022-11-08 DIAGNOSIS — S06.0X0D CONCUSSION WITHOUT LOSS OF CONSCIOUSNESS, SUBSEQUENT ENCOUNTER: Primary | ICD-10-CM

## 2022-11-08 NOTE — LETTER
"  11/8/2022      RE: Manda Lincoln  [address]  [place] [address]     Dear Colleague,    Thank you for referring your patient, Manda Lincoln, to the [facility] STUDENT ATHLETIC CLINIC. Please see a copy of my visit note below.    [facility] Athletic Medicine Clinic              SUBJECTIVE:      Manda Lincoln is a 22 year old female presenting to clinic today for subsequent concussion eval.        Interval Hx:  Doing better  Biking for up to 25 min while reading without difficulty  Her main symptoms currently include: neck pain, fatigue as the day progresses and that is improving, MOMIN  Ready to try skating by herself.  No weights yet  Saw Dr. Rivera Baugh:  Prescribed new glasses for indoors, at class to help with convergence but didn't prescribe eye rehab.  Glasses are coming this week.  School:  RTL 5 and isn't behind in any classes  Feeling more energetic like her normal self  No meds for HA or neck. Seeing Dr. Navas for adjustments.    Tightness at the head and neck junction      PMH, Medications and Allergies were reviewed and updated as needed.     ROS:  As noted above otherwise negative.     There is no problem list on file for this patient.        Current Outpatient Prescriptions          Current Outpatient Medications   Medication Sig Dispense Refill     escitalopram (LEXAPRO) 10 MG tablet Take 1 tablet (10 mg) by mouth daily 90 tablet 3     escitalopram (LEXAPRO) 20 MG tablet Take 1 tablet (20 mg) by mouth daily 90 tablet 1                    OBJECTIVE:      Vitals: /72   Pulse 67   Ht 1.753 m (5' 9\")   Wt 80.3 kg (177 lb)   BMI 26.14 kg/m          Gen:  Well nourished and in no acute distress  Psych: Euthymic   Neuro: Alert and oriented.     Neck:  FROM with tightness over the subocciptal region, nttp over the SP       ASSESSMENT & PLAN:            21 yo  with concussion with neuro-oc symptoms: making good progress  -Myofascial " cervical pain  -s/p concussion and previous concussion hx with neuro-ocular dysfunction earlier in 2022.   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        PLAN:   - continue concussion protocol  - f/u with neurooptometry as directed.  New glasses will be available soon.   -RTC in 2-3 weeks or sooner if progressing well  -Continue with RTL Level 5  -Continue cardio   -Declines NSAIDs for neck.  Discussed trigger point injections in the sub-occiptal region as one option  -Continue modalites and Chiro for myofascial pain     The patient was managed according to the  Athletic Medicine Concussion Management Protocol.        Options for treatment and/or follow-up care were reviewed with the patient and , Kenya Meredith. Patient was actively involved in the decision making process. Patient verbalized understanding and was in agreement with the plan.    Matt Renner DO, Sports Medicine Fellow saw and examined the patient as well.      Lacey Coon MD, CAQ, FACSM, CCD  [facility]  Sports Medicine and Bone Health  Team Physician;  Athletics

## 2022-11-08 NOTE — PROGRESS NOTES
"[facility] Athletic Medicine Clinic              SUBJECTIVE:      Manda Lincoln is a 22 year old female presenting to clinic today for subsequent concussion eval.        Interval Hx:  Doing better  Biking for up to 25 min while reading without difficulty  Her main symptoms currently include: neck pain, fatigue as the day progresses and that is improving, MOMIN  Ready to try skating by herself.  No weights yet  Saw Dr. Rivera Baugh:  Prescribed new glasses for indoors, at class to help with convergence but didn't prescribe eye rehab.  Glasses are coming this week.  School:  RTL 5 and isn't behind in any classes  Feeling more energetic like her normal self  No meds for HA or neck. Seeing Dr. Navas for adjustments.    Tightness at the head and neck junction      PMH, Medications and Allergies were reviewed and updated as needed.     ROS:  As noted above otherwise negative.     There is no problem list on file for this patient.        Current Outpatient Prescriptions          Current Outpatient Medications   Medication Sig Dispense Refill     escitalopram (LEXAPRO) 10 MG tablet Take 1 tablet (10 mg) by mouth daily 90 tablet 3     escitalopram (LEXAPRO) 20 MG tablet Take 1 tablet (20 mg) by mouth daily 90 tablet 1                    OBJECTIVE:      Vitals: /72   Pulse 67   Ht 1.753 m (5' 9\")   Wt 80.3 kg (177 lb)   BMI 26.14 kg/m          Gen:  Well nourished and in no acute distress  Psych: Euthymic   Neuro: Alert and oriented.     Neck:  FROM with tightness over the subocciptal region, nttp over the SP       ASSESSMENT & PLAN:            21 yo  with concussion with neuro-oc symptoms: making good progress  -Myofascial cervical pain  -s/p concussion and previous concussion hx with neuro-ocular dysfunction earlier in 2022.   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        PLAN:   - continue concussion protocol  - f/u with neurooptometry as directed.  New glasses will be available soon.   -RTC in 2-3 weeks or sooner if progressing well  -Continue with RTL Level 5  -Continue cardio   -Declines NSAIDs for neck.  Discussed trigger point injections in the sub-occiptal region as one option  -Continue modalites and Chiro for myofascial pain     The patient was managed according to the  Athletic Medicine Concussion Management Protocol.        Options for treatment and/or follow-up care were reviewed with the patient and , Kenya Meredith. Patient was actively involved in the decision making process. Patient verbalized understanding and was in agreement with the plan.    Matt Renner DO, Sports Medicine Fellow saw and examined the patient as well.      Lacey Coon MD, CAQ, FACSM, CCD  [facility]  Sports Medicine and Bone Health  Team Physician;  Athletics

## 2022-11-09 ENCOUNTER — DOCUMENTATION ONLY (OUTPATIENT)
Dept: FAMILY MEDICINE | Facility: CLINIC | Age: 22
End: 2022-11-09

## 2022-11-09 NOTE — PROGRESS NOTES
"[facility] ATHLETIC MEDICINE  [facility]   Sport Psychology Progress Note      Location of Visit: [facility] Athletic Department   Date of Visit: 11/9/22  Duration of Session: 30 minutes - Alta shared that she needed to leave her appointment early.   Referred by:     Self-reported Concerns/Symptoms:  Anxiety/worry  Transition/adjustment  Other: Relationship break-up     \"I don't handle the unknown very well.\"     Suicide and Risk Assessment:  Recent suicidal thoughts: No  Past suicidal thoughts: No  Recent homicidal thoughts: No  Any attempts in the past: No  Any family/friends/loved ones die by suicide: No  Plan or considering various methods: No  Access to guns: No  Protective factors: no h/o suicide attempt, no plan or intent, no h/o risky impulsive behavior, no access to lethal means, h/o seeking help when needed, future oriented, feeling hopeful, commitment to family, good social support   and stable housing    Alta denies current urges to self-harm, homicidal ideation, suicidal ideation, means, plans, or intent.    Mental Status & Observations:  Alta appeared generally alert and oriented. Dress was appropriate to the weather and occasion. Grooming and hygiene were appropriate. Eye contact was good. Speech was of normal volume and normal. Thought processes were relevant, logical and goal-directed. Thought content was within normal limits with no evidence of psychotic or paranoid features. Memory appeared intact. She exhibited normal motor activity during the appointment. Mood was improved with congruent affect. Insight and judgment appeared age appropriate with good focus in session.  Behavior was candid and open    Observations & Response:   Alta reports that she is taking 30mg Lexapro (). She reports that she recently suffered a concussion. She continues to reports depression and anxiety symptoms in episodic nature. She notes noticing wanting to " "sleep, tearfulness and feeling inadequate and under-valued. She reports noticing quietness, self-blame, feelings of guilt, noticing the \"negative\" and irritability at times. She reports feeling hopeless/helpless and \"lost\" at times. She denies suicidal thoughts. She reports being told today that she will be sitting in the stands for the game \"scratched\" for the first time. She reports that it feels demoralizing to her. She reports significant tension with her teammate/roommate to the point where she states feeling very unhappy, miserable and looking for options to move. She reports that her grandfather recently was readmitted to the hospital as well. She notes feeling very supported by her parents.  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    Historically, she has noticed anxiety, low mood,  irritability, fatigue, indecisiveness, self-criticalness, loss of enjoyment/pleasure, sadness, loss of appetite and feeling down. She reports feeling agitated and restless often, and at at times feels so overwhelmed that she \" legs hard to where they bruise.\" She reports feeling as though she worries most days and feels it's negatively impacting her relationships. She experiences food allergies (e.g., eggs, dairy, chicken, gluten) and some anxiety particularly with social interactions. Known history of mental health in self: Yes, No previous mental health treatment but describes previous symptoms or experiences including tearfulness/crying, feeling low, panic attacks, anxiety about \"lots of things\" including performance/tests, and self-worth concerns. Ct also reports self-criticalness. CT reports that she notices crying multiple times x week and panic attacks 1 x per week where she feels chest tightness, difficulties breathing and \"can't control my thoughts.\" (Fall 2020) Ct also reports impatience with experiences at times where \"if I get an idea, I want to do it right away or I get " "discouraged.\"    Known history of mental health in family member(s): Yes: Brother was dx with depression and participated in counseling; ADHD; Grandfather- depression    Intervention:   Further assessed mental health symptoms including administering a screening questionnaire for bipolar symptoms. While she endorsed common symptoms of anxiety and depression, she denied symptoms common with hypomania. Processed her reactions to suffering another injury with her recent concussion.  Encouraged her to meet with Dr. Najera for follow-up .      Coping skills, strengths and supports:   Communication skills, Exercise, Family support, Insight and sensitivity, Maturity and judgment, Motivation for change, Pentecostalism/spirituality , Socioeconomic stability, Social support system and Use of available services    Goals for counseling:  Ct reports that she does not \"handle the unknown very well\" and would like to get better at coping with uncertainty.1) \"Learn to embrace the present more and not worry about the future as much.\" 2) \"Learn ways to cope when I'm feeling anxious/panicky.\"    Clinical impressions or Other:  300.02 (F41.4) Generalized Anxiety Disorder  F.33 Major Depressive Disorder, recurrent     Therapy objectives/goals:  Assist with transition into college  Build resilience and response to adversity  Decrease anxiety symptoms  Decrease depressive symptoms  Decrease perceived stress  Enhance self-advocacy  Enhance self-care  Increase assertiveness  Increase mindfulness and the ability to be present  Increase self-awareness  Increase self-esteem and self-worth  Increase willingness to be vulnerable and experience emotions  Improve mood  Provide support  Teach and improve coping skills    Therapy follow-up plan:  Individual counseling sessions weekly  Follow up with sports medicine physician  Participation with [employer] online mental health resource      Jag Ross, PhD LP, [facility]  "

## 2022-11-10 NOTE — PROGRESS NOTES
"[facility] ATHLETICS  [facility] ATC follow-up note  Date of service performed: 11/7/2022    Concern/injury: Concussion    Alta comes in today saying she feels good and is \"itching to get back on the ice\". She continues to have some neck discomfort, but this is likely more muscular in nature due to tight cervical muscles, rather than specifically concussion symptoms. She says she is no longer having a headache, but does notice some tension at the base of her skull stemming from her neck tightness.     Assessment/plan: Biked 20 mins. Will follow up with Dr. Coon tomorrow.    Kenya Meredith, ATC      "

## 2022-11-10 NOTE — PROGRESS NOTES
[facility] ATHLETICS  [facility] ATC follow-up note  Date of service performed: 11/4/2022    Concern/injury: Concussion    Alta comes in today saying her headache has improved. Her symptoms overall are improving. Today she reports 7 symptoms, the worst of which is neck pain. SHe is not taking any pain medication at this time. She went to get her new glasses, which should be in sometime next week, which she is very excited for.     Alta biked with BFR on her left leg for 18 minutes today, at which time she became more symptomatic, including nausea, which has not been an issue for her most of the time. She discontinued biking when the symptoms got worse.     Assessment/plan: Alta did come to the game tonight, and was able to stay the entire time. She will come to the game tomorrow but will not do any physical activity tomorrow. Will have her come in for check in on Monday. [facility] concussion management plan is being followed.    Kenya Meredith, ATC

## 2022-11-10 NOTE — PROGRESS NOTES
[facility] ATHLETICS  [facility] ATC follow-up note  Date of service performed: 11/3/2022    Concern/injury: Concussion    Alta texted me today that she had a headache since she woke up. Decided to not have her come in to see me today and instead rest. Will follow up with her tomorrow.      Kenya Meredith, ATC

## 2022-11-10 NOTE — PROGRESS NOTES
[facility] ATHLETICS  [facility] ATC follow-up note  Date of service performed: 11/8/2022    Concern/injury: Concussion    Alta saw Dr. Coon today for concussion follow up. She is doing well. She is full in classes. She has been doing some physical activity (mainly biking) and not having an increase in symptoms since last week. She does notice some neck tightness/soreness.     Assessment/plan: Dr. Coon is pleased with Alta's progress. Continue to progress through RTP as tolerated. Alta will skate on her own tomorrow. [facility] concussion management plan is being followed.    Kenya Meredith, ATC

## 2022-11-10 NOTE — PROGRESS NOTES
[facility] ATHLETICS  [facility] ATC follow-up note  Date of service performed: 11/9/2022    Concern/injury: Concussion    Alta comes in today reporting no symptoms except for some muscular neck pain. She did get her glasses yesterday. So far she is liking them. She would like to try skating on her own today. Completed level 2 of RTP - did some skating and light stickhandling on her own for about 20 minutes with no increase in symptoms.     Assessment/plan: Continue progressing through RTP protocol. Will get her back into the weight room next week if she remains asymptomatic today.    She did get a massage today at [facility] and said that helped with her neck/base of skull tightness.    [facility] concussion management plan is being followed.    Kenya Meredith, ATC

## 2022-11-10 NOTE — PROGRESS NOTES
[facility] ATHLETICS  [facility] ATC follow-up note  Date of service performed: 11/10/2022    Concern/injury: Concussion    Alta comes in today for check in and to do more biking/skating. She reports feeling really good and back to her normal self. Symptom inventory completed:  Headache = 0  Pressure in head = 0  Neck Pain = 1 (**believed to be more muscle tightness than a concussion symptom)  Nausea/Vomiting = 0  Dizziness = 0  Blurred vision = 0  Balance problems = 0  Sensitivity to light = 0  Sensitivity to noise = 0  Feeling slowed down = 0  Feeling in a fog = 0  Don't feel right = 0  Difficulty concentrating = 0  Difficulty remembering = 0  Fatigue/low energy = 0  Confusion = 0  Drowsiness = 0  More emotional = 0  Irritability = 0  Sadness = 0  Nervous/anxious = 0  Trouble falling asleep = 0    She completed 20 mins of biking with BFR on left leg followed by 25 minutes of on ice one-on-one work with . She has successfully completed level 2 of RTP without any symptoms.       Assessment/plan: Will complete impact test. Discussed RTP progression with Dr. Coon, who agreed if impact test looks good then can progress to non-contact and then to full contact practices next week.    Alta will come in tomorrow for a bike workout, as team has off the next three days of practice.     Kenya Meredith, ATC

## 2022-11-16 ENCOUNTER — DOCUMENTATION ONLY (OUTPATIENT)
Dept: FAMILY MEDICINE | Facility: CLINIC | Age: 22
End: 2022-11-16

## 2022-11-16 NOTE — PROGRESS NOTES
[facility] ATHLETIC MEDICINE  [facility]   Sport Psychology No Show Note      Manda Lincoln late cancelled her Sport Psychology appointment scheduled to take place at [facility] on 11/16/22. The student-athlete was notified via email of their missed sport psychology appointment, a description of the No-Show policy within Athletics, and guidance on how to reschedule, if interested.      Jag Ross, PhD LP , [facility]

## 2022-11-20 NOTE — PROGRESS NOTES
[facility] ATHLETICS  [facility] ATC follow-up note  Date of service performed: 11/15/2022    Concern/injury: Concussion    Alta completed 70 minute non-contact practice with the team again today. No issues.     Assessment/plan: Progress to full contact practice tomorrow.     Kenya Meredith, ATC

## 2022-11-20 NOTE — PROGRESS NOTES
[facility] ATHLETICS  [facility] ATC follow-up note  Date of service performed:11/16/2022    Concern/injury: Concussion    Alta completed 80 minute full contact practice with the team today. She had no issues and no symptoms before, during, or after activity.     Assessment/plan: Full contact practice tomorrow again.     Kenya Meredith, ATC

## 2022-11-20 NOTE — PROGRESS NOTES
[facility] ATHLETICS  [facility] ATC follow-up note  Date of service performed: 11/14/2022    Concern/injury: Concussion    Alta completed her impact test on 11/10/2022 which is back to baseline. SCAT5 is back to baseline.    Today, she completed full non-contact practice with the team. This lasted 75 minutes. She reports no symptoms before, during, or after activity.     Assessment/plan: Will do non-contact practice again tomorrow with team and then move to full contact on Wednesday if she does well. [facility] concussion management plan is being followed.    Kenya Meredith, ATC

## 2022-11-20 NOTE — PROGRESS NOTES
[facility] ATHLETICS  [facility] ATC follow-up note  Date of service performed: 11/17/2022    Concern/injury: Concussion    Alta completed full contact practice (60 mins) today with the team. She has successfully completed all RTP steps with no symptoms. She is not traveling with the team this weekend to [place], so we will wait until Monday to have her see Dr. Coon for final clearance.     Assessment/plan: Concussion resolved. [facility] concussion management plan has been followed.    Kenya Meredith, ATC

## 2022-11-21 ENCOUNTER — OFFICE VISIT (OUTPATIENT)
Dept: FAMILY MEDICINE | Facility: CLINIC | Age: 22
End: 2022-11-21
Payer: COMMERCIAL

## 2022-11-21 VITALS
HEIGHT: 69 IN | HEART RATE: 65 BPM | DIASTOLIC BLOOD PRESSURE: 75 MMHG | BODY MASS INDEX: 26.56 KG/M2 | SYSTOLIC BLOOD PRESSURE: 120 MMHG | WEIGHT: 179.3 LBS

## 2022-11-21 DIAGNOSIS — S06.0X0D CONCUSSION WITHOUT LOSS OF CONSCIOUSNESS, SUBSEQUENT ENCOUNTER: Primary | ICD-10-CM

## 2022-11-21 NOTE — LETTER
"  11/21/2022      RE: Manda Lincoln  [address]  [place] [address]     Dear Colleague,    Thank you for referring your patient, Manda Lincoln, to the [facility]UDENT A[facility]LETIC [facility]. Please see a copy of my visit note below.    [facility] Athletic Medicine Clinic              SUBJECTIVE:      Manda Lincoln is a 22 year old female presenting to clinic today for subsequent concussion eval.         Interval Hx:    Doing better: able to skate and do lifting.   Full hockey training now including scrimmages with contact. No games yet.   Has had new glasses from Dr. Rivera Baugh for about two weeks and uses them for reading essentially  School:  RTL 5   Feels fully recovered except for a little hesitant about full contact yet.  She had this same feeling after her hip surgery.    No meds for HA or neck. Seeing Dr. Navas for adjustments.    Neck tightness has resolved.        PMH, Medications and Allergies were reviewed and updated as needed.     ROS:  As noted above otherwise negative.     There is no problem list on file for this patient.        Current Outpatient Prescriptions               Current Outpatient Medications   Medication Sig Dispense Refill     escitalopram (LEXAPRO) 10 MG tablet Take 1 tablet (10 mg) by mouth daily 90 tablet 3     escitalopram (LEXAPRO) 20 MG tablet Take 1 tablet (20 mg) by mouth daily 90 tablet 1                    OBJECTIVE:      Vitals: /72   Pulse 67   Ht 1.753 m (5' 9\")   Wt 80.3 kg (177 lb)   BMI 26.14 kg/m          Gen:  Well nourished and in no acute distress  Psych: Euthymic   Neuro: Alert and oriented.     Neck:  FROM with tightness over the subocciptal region, nttp over the SP       ASSESSMENT & PLAN:             23 yo  with concussion with neuro-oc symptoms: resolved but with concerns over contact play  -Myofascial cervical pain: resolved  -s/p concussion and previous concussion hx with neuro-ocular " dysfunction earlier in 2022.   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         PLAN:   - Continue her current RTP level without full clearance for games yet.   -Continue to use the  new glasses for reading  -RTC in 2-3 weeks   -Continue with RTL Level 5  -Likely just needs more time in contact situations before playing in a game.  Suggested discussed with Sports Psychologist as well at her next visit which is next week.          The patient was managed according to the  Athletic Medicine Concussion Management Protocol.        Options for treatment and/or follow-up care were reviewed with the patient and , Kenya Meredith. Patient was actively involved in the decision making process. Patient verbalized understanding and was in agreement with the plan.     Matt Renner DO, Sports Medicine Fellow saw and examined the patient as well.      Lacey Coon MD, CAQ, FACSM, CCD  [facility]  Sports Medicine and Bone Health  Team Physician;  Athletics

## 2022-11-21 NOTE — PROGRESS NOTES
"[facility]              SUBJECTIVE:      Manda Lincoln is a 22 year old female presenting to clinic today for subsequent concussion eval.         Interval Hx:    Doing better: able to skate and do lifting.   Full hockey training now including scrimmages with contact. No games yet.   Has had new glasses from Dr. Rivera Baugh for about two weeks and uses them for reading essentially  School:  RTL 5   Feels fully recovered except for a little hesitant about full contact yet.  She had this same feeling after her hip surgery.    No meds for HA or neck. Seeing Dr. Navas for adjustments.    Neck tightness has resolved.        PMH, Medications and Allergies were reviewed and updated as needed.     ROS:  As noted above otherwise negative.     There is no problem list on file for this patient.        Current Outpatient Prescriptions               Current Outpatient Medications   Medication Sig Dispense Refill     escitalopram (LEXAPRO) 10 MG tablet Take 1 tablet (10 mg) by mouth daily 90 tablet 3     escitalopram (LEXAPRO) 20 MG tablet Take 1 tablet (20 mg) by mouth daily 90 tablet 1                    OBJECTIVE:      Vitals: /72   Pulse 67   Ht 1.753 m (5' 9\")   Wt 80.3 kg (177 lb)   BMI 26.14 kg/m          Gen:  Well nourished and in no acute distress  Psych: Euthymic   Neuro: Alert and oriented.     Neck:  FROM with tightness over the subocciptal region, nttp over the SP       ASSESSMENT & PLAN:             23 yo  with concussion with neuro-oc symptoms: resolved but with concerns over contact play  -Myofascial cervical pain: resolved  -s/p concussion and previous concussion hx with neuro-ocular dysfunction earlier in 2022.   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         PLAN:   - Continue her current RTP " level without full clearance for games yet.   -Continue to use the  new glasses for reading  -RTC in 2-3 weeks   -Continue with RTL Level 5  -Likely just needs more time in contact situations before playing in a game.  Suggested discussed with Sports Psychologist as well at her next visit which is next week.          The patient was managed according to the  Athletic Medicine Concussion Management Protocol.        Options for treatment and/or follow-up care were reviewed with the patient and , Kenya Meredith. Patient was actively involved in the decision making process. Patient verbalized understanding and was in agreement with the plan.     Matt Renner DO, Sports Medicine Fellow saw and examined the patient as well.      Lacey Coon MD, CAQ, FACSM, CCD  [facility]  Sports Medicine and Bone Health  Team Physician;  Athletics

## 2022-11-30 ENCOUNTER — OFFICE VISIT (OUTPATIENT)
Dept: ORTHOPEDICS | Facility: CLINIC | Age: 22
End: 2022-11-30
Payer: COMMERCIAL

## 2022-11-30 VITALS
WEIGHT: 178.6 LBS | SYSTOLIC BLOOD PRESSURE: 116 MMHG | DIASTOLIC BLOOD PRESSURE: 73 MMHG | HEART RATE: 69 BPM | HEIGHT: 69 IN | BODY MASS INDEX: 26.45 KG/M2

## 2022-11-30 DIAGNOSIS — F41.8 DEPRESSION WITH ANXIETY: ICD-10-CM

## 2022-11-30 DIAGNOSIS — S06.0X0D CONCUSSION WITHOUT LOSS OF CONSCIOUSNESS, SUBSEQUENT ENCOUNTER: Primary | ICD-10-CM

## 2022-11-30 RX ORDER — ESCITALOPRAM OXALATE 20 MG/1
20 TABLET ORAL DAILY
Qty: 90 TABLET | Refills: 1 | Status: SHIPPED | OUTPATIENT
Start: 2022-11-30 | End: 2023-03-29

## 2022-11-30 NOTE — LETTER
"  11/30/2022      RE: Manda Lincoln  [address]  [place] [address]     Dear Colleague,    Thank you for referring your patient, Manda Lincoln, to the [facility] STUDENT [facility]THLETIC [facility]IN[facility]. Please see a copy of my visit note below.    [facility] CONCUSSION FOLLOW UP    Manda Lincoln MRN# [id number]   Age: 22 year old YOB: 2000           Chief Complaint:     Follow up concussion          History of Present Illness:     Manda Lincoln is here for f/u of concussion.  Has been going through and advancing through our [facility] concussion program with no issues.  No symptoms at this point. Tolerating full practices. Feels back to normal self and now confident to return to full play.  No concerns or other issues.            Medications:     Current Outpatient Medications   Medication Sig     escitalopram (LEXAPRO) 10 MG tablet Take 1 tablet (10 mg) by mouth daily     escitalopram (LEXAPRO) 20 MG tablet Take 1 tablet (20 mg) by mouth daily     No current facility-administered medications for this visit.             Allergies:      Allergies   Allergen Reactions     Amoxicillin      Penicillin G             Review of Systems:   A comprehensive 10 point review of systems (constitutional, ENT, cardiac, peripheral vascular, respiratory, GI, , Musculoskeletal, skin, Neurological) was performed and found to be negative except as described in this note.           Physical Exam:   COMPLETE EXAMINATION:   VITAL SIGNS: /73   Pulse 69   Ht 1.753 m (5' 9\")   Wt 81 kg (178 lb 9.6 oz)   BMI 26.37 kg/m    GEN: healthy, alert and no distress   HEENT:   Head: Normocephalic and atraumatic.   Eyes: PERRLA, EOMI, Nystagmus: Yes ; Painful Eye movements: No  Mouth/Throat: MMM, No erythema or exudate.   Neck:  supple, non-tender, FULL ROM  CV: S1S2 normal, RRR, no murmur  CHEST: CTA, Easy effort, No rales or wheezes  ABD: Soft. Nontender/nondistended, no HSM/mass, no " rebound/guarding.  SKIN: No rash, warmth or erythema  PSYCH:mentation appears normal and affect normal/bright  NEURO: Normal strength and tone, mentation intact and speech normal  Gait: Walk in hallway at normal speed: Able   Coordination:  Finger to Nose: normal    Rapid Alternating Movements:normal  Balance Testing:         Double leg stance eyes closed: normal                  Single leg stance eyes closed:normal        Feet Tandem eyes closed: normal  Convergence Testing: Normal (6-8cm)    VOMS: Smooth Pursuit: normal    Saccades horizontal: normal    Saccades vertical: normal    VORx1: normal    VOR cancellation: normal      ImPACT Testing Scores: See scanned form normal           Assessment:     Concussion, resolved  Depression/anxiety        Plan:     1. After advancing through the [facility] Concussion protocol and passing all levels, athlete may return to full activity and sports participation without restriction.  2. Refilled Lexapro.  3. Follow up prn    DELMY Meredith was present for the entire visit.        Again, thank you for allowing me to participate in the care of your patient.      Sincerely,    Karina Najera MD

## 2022-12-01 NOTE — PROGRESS NOTES
"[facility] CONCUSSION FOLLOW UP    Manda Lincoln MRN# [id number]   Age: 22 year old YOB: 2000           Chief Complaint:     Follow up concussion          History of Present Illness:     Manda Lincoln is here for f/u of concussion.  Has been going through and advancing through our [facility] concussion program with no issues.  No symptoms at this point. Tolerating full practices. Feels back to normal self and now confident to return to full play.  No concerns or other issues.            Medications:     Current Outpatient Medications   Medication Sig     escitalopram (LEXAPRO) 10 MG tablet Take 1 tablet (10 mg) by mouth daily     escitalopram (LEXAPRO) 20 MG tablet Take 1 tablet (20 mg) by mouth daily     No current facility-administered medications for this visit.             Allergies:      Allergies   Allergen Reactions     Amoxicillin      Penicillin G             Review of Systems:   A comprehensive 10 point review of systems (constitutional, ENT, cardiac, peripheral vascular, respiratory, GI, , Musculoskeletal, skin, Neurological) was performed and found to be negative except as described in this note.           Physical Exam:   COMPLETE EXAMINATION:   VITAL SIGNS: /73   Pulse 69   Ht 1.753 m (5' 9\")   Wt 81 kg (178 lb 9.6 oz)   BMI 26.37 kg/m    GEN: healthy, alert and no distress   HEENT:   Head: Normocephalic and atraumatic.   Eyes: PERRLA, EOMI, Nystagmus: Yes ; Painful Eye movements: No  Mouth/Throat: MMM, No erythema or exudate.   Neck:  supple, non-tender, FULL ROM  CV: S1S2 normal, RRR, no murmur  CHEST: CTA, Easy effort, No rales or wheezes  ABD: Soft. Nontender/nondistended, no HSM/mass, no rebound/guarding.  SKIN: No rash, warmth or erythema  PSYCH:mentation appears normal and affect normal/bright  NEURO: Normal strength and tone, mentation intact and speech normal  Gait: Walk in hallway at normal speed: Able   Coordination:  Finger to Nose: normal    Rapid " Alternating Movements:normal  Balance Testing:         Double leg stance eyes closed: normal                  Single leg stance eyes closed:normal        Feet Tandem eyes closed: normal  Convergence Testing: Normal (6-8cm)    VOMS: Smooth Pursuit: normal    Saccades horizontal: normal    Saccades vertical: normal    VORx1: normal    VOR cancellation: normal      ImPACT Testing Scores: See scanned form normal           Assessment:     Concussion, resolved  Depression/anxiety        Plan:     1. After advancing through the [facility] Concussion protocol and passing all levels, athlete may return to full activity and sports participation without restriction.  2. Refilled Lexapro.  3. Follow up prn    DELMY Meredith was present for the entire visit.

## 2022-12-09 DIAGNOSIS — J11.1 INFLUENZA: Primary | ICD-10-CM

## 2022-12-09 RX ORDER — OSELTAMIVIR PHOSPHATE 75 MG/1
75 CAPSULE ORAL 2 TIMES DAILY
Qty: 10 CAPSULE | Refills: 0 | Status: SHIPPED | OUTPATIENT
Start: 2022-12-09 | End: 2022-12-14

## 2022-12-09 NOTE — PROGRESS NOTES
Alta developed fever yesterday and tested positive for influenza yesterday.    Tamiflu prescribed. Reviewed R/B/SE.    Karina Najera MD

## 2022-12-15 NOTE — PROGRESS NOTES
[facility] ATHLETICS  Eliel ATC follow-up note  Date of service performed: 12/3/2022    Concern/injury: Concussion    Alta played in game today and had no issues. Concussion resolved. [facility] Concussion Management Plan was followed.    Kenya Meredith, ATC

## 2022-12-15 NOTE — PROGRESS NOTES
[facility] ATHLETICS  Eliel ATC follow-up note  Date of service performed: 11/30/2022    Concern/injury: Concussion    Alta came to me yesterday asking to see a physician for full clearance, as she feels mentally ready to go back into games.     She saw Dr. Najera today for final clearance. She is cleared for all activity with no restrictions. Should she develop any symptoms in the future, she should report them immediately to me.     Kenya Meredith, ATC

## 2023-02-18 DIAGNOSIS — F41.8 DEPRESSION WITH ANXIETY: ICD-10-CM

## 2023-02-18 RX ORDER — ESCITALOPRAM OXALATE 10 MG/1
10 TABLET ORAL DAILY
Qty: 30 TABLET | Refills: 0 | Status: SHIPPED | OUTPATIENT
Start: 2023-02-18 | End: 2023-03-29

## 2023-03-29 DIAGNOSIS — F41.8 DEPRESSION WITH ANXIETY: ICD-10-CM

## 2023-03-29 RX ORDER — ESCITALOPRAM OXALATE 10 MG/1
10 TABLET ORAL DAILY
Qty: 30 TABLET | Refills: 0 | Status: SHIPPED | OUTPATIENT
Start: 2023-03-29 | End: 2023-04-12

## 2023-03-29 RX ORDER — ESCITALOPRAM OXALATE 20 MG/1
20 TABLET ORAL DAILY
Qty: 30 TABLET | Refills: 0 | Status: SHIPPED | OUTPATIENT
Start: 2023-03-29 | End: 2023-04-12

## 2023-03-29 RX ORDER — ESCITALOPRAM OXALATE 20 MG/1
20 TABLET ORAL DAILY
Qty: 90 TABLET | Refills: 1 | Status: SHIPPED | OUTPATIENT
Start: 2023-03-29 | End: 2023-03-29

## 2023-04-12 ENCOUNTER — OFFICE VISIT (OUTPATIENT)
Dept: ORTHOPEDICS | Facility: CLINIC | Age: 23
End: 2023-04-12
Payer: COMMERCIAL

## 2023-04-12 VITALS
WEIGHT: 191.7 LBS | HEIGHT: 69 IN | HEART RATE: 77 BPM | DIASTOLIC BLOOD PRESSURE: 75 MMHG | BODY MASS INDEX: 28.39 KG/M2 | SYSTOLIC BLOOD PRESSURE: 121 MMHG

## 2023-04-12 DIAGNOSIS — F41.8 DEPRESSION WITH ANXIETY: ICD-10-CM

## 2023-04-12 RX ORDER — ESCITALOPRAM OXALATE 20 MG/1
20 TABLET ORAL DAILY
Qty: 90 TABLET | Refills: 0 | Status: SHIPPED | OUTPATIENT
Start: 2023-04-12 | End: 2023-09-13

## 2023-04-12 RX ORDER — ESCITALOPRAM OXALATE 10 MG/1
10 TABLET ORAL DAILY
Qty: 90 TABLET | Refills: 0 | Status: SHIPPED | OUTPATIENT
Start: 2023-04-12 | End: 2023-04-28

## 2023-04-12 NOTE — LETTER
"  4/12/2023      RE: Manda Lincoln  [address]  [place] [address]     Dear Colleague,    Thank you for referring your patient, Manda Lincoln, to the [facility] STUDENT ATHLETIC CLINIC. Please see a copy of my visit note below.    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 She has been on lexapro 30 mg for quite some time and feels like this continues to help. She wishes to stay on the medication. No side effects, sleeping ok. Appetite normal. Has had some recent weight gain that she was surprised to see, but she feels good about her body. No SI/SH.          Medications:     Current Outpatient Medications   Medication Sig    escitalopram (LEXAPRO) 10 MG tablet Take 1 tablet (10 mg) by mouth daily    escitalopram (LEXAPRO) 20 MG tablet Take 1 tablet (20 mg) by mouth daily     No current facility-administered medications for this visit.             Allergies:      Allergies   Allergen Reactions    Amoxicillin     Penicillin G             Review of Systems:   A comprehensive 10 point review of systems (constitutional, ENT, cardiac, peripheral vascular, respiratory, GI, , Musculoskeletal, skin, Neurological) was performed and found to be negative except as described in this note.           Physical Exam:   COMPLETE EXAMINATION:   VITAL SIGNS: /75   Pulse 77   Ht 1.753 m (5' 9\")   Wt 87 kg (191 lb 11.2 oz)   BMI 28.31 kg/m    GEN: Alert, well-nourished, and in no distress.   HEENT:   Head: Normocephalic and atraumatic.   Eyes: PERRLA, EOMI  NEURO: Alert and oriented to person, place, and time. Gait normal.   PSYCH: Mood, memory, affect and judgment normal.           Assessment:     Depression/anxiety        Plan:     1. Continue escitalopram 30 mg daily.  2. Resume " sports psych.  3. Follow up in 3 months.    Karina Najera M.D.    DELMY Meredith was present for the entire visit.

## 2023-04-28 RX ORDER — ESCITALOPRAM OXALATE 10 MG/1
10 TABLET ORAL DAILY
Qty: 90 TABLET | Refills: 0 | Status: SHIPPED | OUTPATIENT
Start: 2023-04-28 | End: 2023-09-13

## 2023-05-10 ENCOUNTER — OFFICE VISIT (OUTPATIENT)
Dept: ORTHOPEDICS | Facility: CLINIC | Age: 23
End: 2023-05-10
Payer: COMMERCIAL

## 2023-05-10 VITALS
BODY MASS INDEX: 27.25 KG/M2 | HEIGHT: 69 IN | HEART RATE: 70 BPM | WEIGHT: 184 LBS | SYSTOLIC BLOOD PRESSURE: 118 MMHG | DIASTOLIC BLOOD PRESSURE: 73 MMHG

## 2023-05-10 DIAGNOSIS — M77.8 EXTENSOR CARPI ULNARIS TENDINITIS: Primary | ICD-10-CM

## 2023-05-10 NOTE — LETTER
"  5/10/2023      RE: Manda Lincoln  [address]  [place] [address]     Dear Colleague,    Thank you for referring your patient, Manda Lincoln, to the [facility] STUDENT ATHLETIC CLINIC. Please see a copy of my visit note below.    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 No obvious MICHEAL but noticed pain after a game. Localizes along ulnar dorsal aspect. Pain with gripping, stick handling and ulnar deviation. Mild swelling.            Medications:     Current Outpatient Medications   Medication Sig     escitalopram (LEXAPRO) 10 MG tablet Take 1 tablet (10 mg) by mouth daily     escitalopram (LEXAPRO) 20 MG tablet Take 1 tablet (20 mg) by mouth daily     No current facility-administered medications for this visit.             Allergies:      Allergies   Allergen Reactions     Amoxicillin      Penicillin G             Review of Systems:   A comprehensive 10 point review of systems (constitutional, ENT, cardiac, peripheral vascular, respiratory, GI, , Musculoskeletal, skin, Neurological) was performed and found to be negative except as described in this note.           Physical Exam:   COMPLETE EXAMINATION:   VITAL SIGNS: /73   Pulse 70   Ht 1.753 m (5' 9\")   Wt 83.5 kg (184 lb)   BMI 27.17 kg/m    GEN: Alert, well-nourished, and in no distress.   HEENT:   Head: Normocephalic and atraumatic.   Eyes: PERRLA, EOMI  NEURO: Alert and oriented to person, place, and time. Gait normal.  SKIN: No rash, warmth or erythema  PSYCH: Mood, memory, affect and judgment normal.   MSK: right wrist: FROM; mild swelling and TTP along ECU. Pain with active and resisted wrist and 5th finger extension, but synergy test negative. No TTP along TFCC and no pain with forced ulnar " deviation.            Assessment:     Right ECU tendonitis        Plan:     1. Wrist brace and activity modifications.  2. Rehab with ATC and may use ionto with dexamethasone.  3. If sx fail to improve; MR arthrogram wrist for further evaluation (rule out ECU vs TFCC tear).    Karina Najera M.D.    ATC Kenya Meredith was present for the entire visit.      Again, thank you for allowing me to participate in the care of your patient.      Sincerely,    Karina Najera MD

## 2023-05-10 NOTE — PROGRESS NOTES
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 No obvious MICHEAL but noticed pain after a game. Localizes along ulnar dorsal aspect. Pain with gripping, stick handling and ulnar deviation. Mild swelling.            Medications:     Current Outpatient Medications   Medication Sig     escitalopram (LEXAPRO) 10 MG tablet Take 1 tablet (10 mg) by mouth daily     escitalopram (LEXAPRO) 20 MG tablet Take 1 tablet (20 mg) by mouth daily     No current facility-administered medications for this visit.             Allergies:      Allergies   Allergen Reactions     Amoxicillin      Penicillin G             Review of Systems:   A comprehensive 10 point review of systems (constitutional, ENT, cardiac, peripheral vascular, respiratory, GI, , Musculoskeletal, skin, Neurological) was performed and found to be negative except as described in this note.           Physical Exam:   COMPLETE EXAMINATION:   VITAL SIGNS: /73   Pulse 70   Ht 1.753 m (5' 9\")   Wt 83.5 kg (184 lb)   BMI 27.17 kg/m    GEN: Alert, well-nourished, and in no distress.   HEENT:   Head: Normocephalic and atraumatic.   Eyes: PERRLA, EOMI  NEURO: Alert and oriented to person, place, and time. Gait normal.  SKIN: No rash, warmth or erythema  PSYCH: Mood, memory, affect and judgment normal.   MSK: right wrist: FROM; mild swelling and TTP along ECU. Pain with active and resisted wrist and 5th finger extension, but synergy test negative. No TTP along TFCC and no pain with forced ulnar deviation.            Assessment:     Right ECU tendonitis        Plan:     1. Wrist brace and activity modifications.  2. Rehab with ATC and may use ionto with dexamethasone.  3. If sx fail to improve; MR arthrogram wrist for further evaluation (rule out ECU vs TFCC " tear).    Karina Najera M.D.    DELMY Meredith was present for the entire visit.

## 2023-05-22 DIAGNOSIS — M25.531 CHRONIC PAIN OF RIGHT WRIST: Primary | ICD-10-CM

## 2023-05-22 DIAGNOSIS — G89.29 CHRONIC PAIN OF RIGHT WRIST: Primary | ICD-10-CM

## 2023-05-30 ENCOUNTER — ANCILLARY PROCEDURE (OUTPATIENT)
Dept: MRI IMAGING | Facility: CLINIC | Age: 23
End: 2023-05-30
Attending: PEDIATRICS
Payer: COMMERCIAL

## 2023-05-30 DIAGNOSIS — G89.29 CHRONIC PAIN OF RIGHT WRIST: ICD-10-CM

## 2023-05-30 DIAGNOSIS — M25.531 CHRONIC PAIN OF RIGHT WRIST: ICD-10-CM

## 2023-05-30 PROCEDURE — 73221 MRI JOINT UPR EXTREM W/O DYE: CPT | Mod: RT | Performed by: RADIOLOGY

## 2023-06-09 NOTE — PROGRESS NOTES
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 She has been on lexapro 30 mg for quite some time and feels like this continues to help. She wishes to stay on the medication. No side effects, sleeping ok. Appetite normal. Has had some recent weight gain that she was surprised to see, but she feels good about her body. No SI/SH.          Medications:     Current Outpatient Medications   Medication Sig     escitalopram (LEXAPRO) 10 MG tablet Take 1 tablet (10 mg) by mouth daily     escitalopram (LEXAPRO) 20 MG tablet Take 1 tablet (20 mg) by mouth daily     No current facility-administered medications for this visit.             Allergies:      Allergies   Allergen Reactions     Amoxicillin      Penicillin G             Review of Systems:   A comprehensive 10 point review of systems (constitutional, ENT, cardiac, peripheral vascular, respiratory, GI, , Musculoskeletal, skin, Neurological) was performed and found to be negative except as described in this note.           Physical Exam:   COMPLETE EXAMINATION:   VITAL SIGNS: /75   Pulse 77   Ht 1.753 m (5' 9\")   Wt 87 kg (191 lb 11.2 oz)   BMI 28.31 kg/m    GEN: Alert, well-nourished, and in no distress.   HEENT:   Head: Normocephalic and atraumatic.   Eyes: PERRLA, EOMI  NEURO: Alert and oriented to person, place, and time. Gait normal.   PSYCH: Mood, memory, affect and judgment normal.           Assessment:     Depression/anxiety        Plan:     1. Continue escitalopram 30 mg daily.  2. Resume sports psych.  3. Follow up in 3 months.    Karina Najera M.D.    DELMY Meredith was present for the entire visit.    " Island Pedicle Flap With Canthal Suspension Text: The defect edges were debeveled with a #15 scalpel blade.  Given the location of the defect, shape of the defect and the proximity to free margins an island pedicle advancement flap was deemed most appropriate.  Using a sterile surgical marker, an appropriate advancement flap was drawn incorporating the defect, outlining the appropriate donor tissue and placing the expected incisions within the relaxed skin tension lines where possible. The area thus outlined was incised deep to adipose tissue with a #15 scalpel blade.  The skin margins were undermined to an appropriate distance in all directions around the primary defect and laterally outward around the island pedicle utilizing iris scissors.  There was minimal undermining beneath the pedicle flap. A suspension suture was placed in the canthal tendon to prevent tension and prevent ectropion.

## 2023-06-26 ENCOUNTER — DOCUMENTATION ONLY (OUTPATIENT)
Dept: FAMILY MEDICINE | Facility: CLINIC | Age: 23
End: 2023-06-26

## 2023-06-26 NOTE — PROGRESS NOTES
[facility] ATHLETICS  [facility] ATC follow-up note  Date of service performed: 6/26/2023    Concern/injury: Right wrist tendinopathy    Alta had been suffering from right wrist pain since the second half of the season. She did not say anything to me until the end of the season, at which time she saw Dr. Najera for this issue. MRI was done, which showed:  1. No internal derangement of the right wrist.     2. Mild tenosynovitis of the extensor carpi radialis tendon sheaths.      3. 7 mm small synovial/ganglion cyst adjacent to the volar/ulnar  aspect of scaphotrapeziotrapezoidal joint.     Alta completed rehab for approximately 6 weeks, including 4-way wrist,  with putty, wall push ups, supination, pronation, twisting with dumbbell.    She also completed 5 days of iontophoresis over her ECU at the end of May. Tolerated this treatment well.    Assessment/plan: Alta reports her wrist pain has gone away. Case resolved.    Kenya Meredith, ATC

## 2023-09-13 DIAGNOSIS — F41.8 DEPRESSION WITH ANXIETY: ICD-10-CM

## 2023-09-13 RX ORDER — ESCITALOPRAM OXALATE 20 MG/1
20 TABLET ORAL DAILY
Qty: 90 TABLET | Refills: 0 | Status: SHIPPED | OUTPATIENT
Start: 2023-09-13 | End: 2024-01-24

## 2023-09-13 RX ORDER — ESCITALOPRAM OXALATE 10 MG/1
10 TABLET ORAL DAILY
Qty: 90 TABLET | Refills: 0 | Status: SHIPPED | OUTPATIENT
Start: 2023-09-13 | End: 2024-01-24

## 2023-10-18 ENCOUNTER — ANCILLARY PROCEDURE (OUTPATIENT)
Dept: GENERAL RADIOLOGY | Facility: CLINIC | Age: 23
End: 2023-10-18
Attending: PEDIATRICS
Payer: COMMERCIAL

## 2023-10-18 ENCOUNTER — DOCUMENTATION ONLY (OUTPATIENT)
Dept: ORTHOPEDICS | Facility: CLINIC | Age: 23
End: 2023-10-18

## 2023-10-18 ENCOUNTER — DOCUMENTATION ONLY (OUTPATIENT)
Dept: FAMILY MEDICINE | Facility: CLINIC | Age: 23
End: 2023-10-18

## 2023-10-18 DIAGNOSIS — M79.644 FINGER PAIN, RIGHT: ICD-10-CM

## 2023-10-18 DIAGNOSIS — M79.644 FINGER PAIN, RIGHT: Primary | ICD-10-CM

## 2023-10-18 PROCEDURE — 73140 X-RAY EXAM OF FINGER(S): CPT | Mod: RT | Performed by: RADIOLOGY

## 2023-10-18 NOTE — PROGRESS NOTES
[facility] ATHLETICS  Eliel ATC initial assessment note  Date of service performed: 10/18/2023    Concern: Acute injury  Body part: Finger  Description: Right index  Injury: Contusion  Type: Athletics related  Date of injury: 10/17/2023    S: Alta was playing in game on 10/17 at home vs. [place] when she took a puck to her right index finger in the second period. It immediately swelled, but she was able to continue playing. She came into ATR after the game and was looked at by Dr. An. She did have swelling at proximal phalanx. Full ROM and normal strength. Plan to re-evaluate in the morning prior to practice.     She came in today with continued swelling and pain in her right index finger.     O: Moderate swelling at proximal phalanx. No swelling at DIP or PIP joints. No bruising. Does have a palpable small bump in proximal phalanx. Some mild laxity in PIP joint compared to left finger. But no pain with laxity testing. She does have full ROM. Some pain with resisted finger extension, but full strength.     A: Right index finger bony contusion. R/o fracture.     P: She did practice today with roll on lidocaine, voltaren gel, and the finger taped for support. After practice, she said her finger hurt more. Discussed with Dr. Najera via text and agreed to get x-ray today.    Kenya Meredith, ATC

## 2023-10-18 NOTE — PROGRESS NOTES
Alta injured right index finger in hockey game last night when a puck struck her finger. She noticed pain and swelling at the PIP. Examined by Dr. An who noted full ROM, mild TTP at PIP, flexor/extensor mechanisms intact. This morning noticed more swelling and stiffness, so xrays obtained.    Narrative & Impression   XR FINGER RIGHT G/E 2 VIEWS 10/18/2023 1:41 PM      HISTORY:  Finger pain, right     COMPARISON: None.     FINDINGS:   Finger images. Joint spaces are normal.     On the lateral film there is subtle radiolucency at the palmar base  distal phalanx index finger. This is approximately 1.5 mm in mean  dimension and is of uncertain significance. Clinical correlation  recommended as to whether the patient is tender in this area and has  full strength with flexion of the DIP joint of the index finger. This  probably represents change from remote injury involving the palmar  aspect of the articular surface of the distal phalanx.     The remainder the exam is unremarkable.                                                                      IMPRESSION: 1.5 mm in the area of radiolucency at the volar base  distal phalanx index finger. This probably represents change from  remote injury. Clinical correlation as to whether this patient is has  had a previous avulsion fracture in this area.     VENKATESH ADAME MD           She has no tenderness at distal phalanx, so radiolucency here may be due to old injury and is incidental.    A: Right index finger contusion/PIP sprain  P: Berto tape, ice, activity as tolerated.    Karina Najera MD

## 2023-11-03 ENCOUNTER — DOCUMENTATION ONLY (OUTPATIENT)
Dept: FAMILY MEDICINE | Facility: CLINIC | Age: 23
End: 2023-11-03

## 2023-11-04 DIAGNOSIS — M25.512 PAIN IN JOINT OF LEFT SHOULDER: Primary | ICD-10-CM

## 2023-11-04 NOTE — PROGRESS NOTES
[facility] ATHLETICS  Eliel ATC initial assessment note  Date of service performed: 11/3/2023    Concern: Acute injury  Body part: Shoulder  Description: Left  Injury: Pain  Type: Athletics related  Date of injury: 11/1/2023    S: Alta aguirre c/o left shoulder pain October 13th while playing game vs. [employer] [place] [facility] [employer]. The pain was in her posterior shoulder and into her lat. She was seen by Dr. An between periods who noted her scapular dyskinesia. This pain did not inhibit Alta in practice or games.    Earlier this week, she started to notice a new pain in her left shoulder. She now reports the pain is in her lateral and posterior shoulder. She feels it is deep in her muscle. She has also noticed some clicking in her scapula. She continues to have scapular dyskinesia L>R. She has no numbness or weakness in her shoulder or upper extremities.     O: Full ROM with pain in posterior left shoulder when going across her body pr ER. MMT : 5/5 forward elevation, 5/5 abduction (*with pain), 5/5 ER (*with pain), 5/5 IR    A: Left shoulder pain    P: Alta was seen by Dr. An during and after our game on 11/3/2023. She would like to get an MRI of her left shoulder. At this time, continue taping, naproxen prn.    Kenya Meredith, ATC

## 2023-11-06 ENCOUNTER — ANCILLARY PROCEDURE (OUTPATIENT)
Dept: MRI IMAGING | Facility: CLINIC | Age: 23
End: 2023-11-06
Attending: ORTHOPAEDIC SURGERY
Payer: COMMERCIAL

## 2023-11-06 DIAGNOSIS — M25.512 PAIN IN JOINT OF LEFT SHOULDER: ICD-10-CM

## 2023-11-06 PROCEDURE — 73221 MRI JOINT UPR EXTREM W/O DYE: CPT | Mod: LT | Performed by: RADIOLOGY

## 2023-12-06 ENCOUNTER — ANCILLARY PROCEDURE (OUTPATIENT)
Dept: GENERAL RADIOLOGY | Facility: CLINIC | Age: 23
End: 2023-12-06
Attending: ORTHOPAEDIC SURGERY
Payer: COMMERCIAL

## 2023-12-06 DIAGNOSIS — M25.552 PAIN OF LEFT HIP: ICD-10-CM

## 2023-12-06 DIAGNOSIS — M25.552 PAIN OF LEFT HIP: Primary | ICD-10-CM

## 2023-12-06 PROCEDURE — 73522 X-RAY EXAM HIPS BI 3-4 VIEWS: CPT | Performed by: RADIOLOGY

## 2023-12-07 ENCOUNTER — OFFICE VISIT (OUTPATIENT)
Dept: ORTHOPEDICS | Facility: CLINIC | Age: 23
End: 2023-12-07
Payer: COMMERCIAL

## 2023-12-07 DIAGNOSIS — M76.892 HIP FLEXOR TENDINITIS, LEFT: Primary | ICD-10-CM

## 2023-12-07 RX ORDER — MELOXICAM 15 MG/1
15 TABLET ORAL DAILY
Qty: 30 TABLET | Refills: 1 | Status: SHIPPED | OUTPATIENT
Start: 2023-12-07 | End: 2024-01-24

## 2023-12-07 NOTE — PROGRESS NOTES
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 She has a history of bilateral hip arthroscopy with labral repair, cam resection, acetabuloplasty by Dr. Valdez Freed at [facility] in April 2023 (right) and May 2023 (left). She had been doing well but reports onset of left anterior groin pain over the past 6 days, following a game. Denies any trauma or change in activity. Does endorse painless snapping.     There is no problem list on file for this patient.      Current Outpatient Medications   Medication Sig Dispense Refill     escitalopram (LEXAPRO) 10 MG tablet Take 1 tablet (10 mg) by mouth daily 90 tablet 0     escitalopram (LEXAPRO) 20 MG tablet Take 1 tablet (20 mg) by mouth daily 90 tablet 0     meloxicam (MOBIC) 15 MG tablet Take 1 tablet (15 mg) by mouth daily 30 tablet 1               OBJECTIVE:     BMI: There is no height or weight on file to calculate BMI.    GENERAL:  The patient is pleasant and cooperative today and appears her stated age.   She is alert and oriented x3 and in no acute distress.    HENT:  Appearance symmetric with no discoloration or masses.  External ears and nose normal without lesion or deformity.    EYES: Pupils equal and round.  Conjunctivae and lids normal.    RESPIRATORY:  Breathing is unlabored without any audible wheezing.  NEURO/PSY: Normal affect. She shows no signs of agitation or depression.   CARDIOVASC: Well perfused extremities  SKIN: Normal temperature, turgor, and texture; no rash.  MUSCULOSKELETAL:    left Hip  Gait: Normal    Tenderness: None        Hip Range of Motion:   Flexion  Extension IR ER   Affected 120 NT 30 50   Unaffected 120 NT 30 50     Hip Strength (out of 5):   Flexion Extension Abduction Adduction   Affected 4/5 5/5 5/5 5/5   Unaffected 5/5 5/5 5/5 5/5   Iliopsoas: 4/5 left,  5/5 right    Provocative Tests:    Flexion/Adduction/Internal Rotation (FADIR) Test for Labral Tear (Impingement Sign) Positive    Stinchfield Test: Positive    Log Roll Test Negative    NIKOS Test Negative        Distally the patient's neurovascular status is  normal with sensation intact to light touch superficial peroneal, deep peroneal, sural, saphenous, and tibial nerve distributions. Motor grossly intact EHL, Tibialis anterior, gastroc-soleus complex    Limb is  well perfused with palpable pulses and brisk capillary refill <2 seconds       IMAGING:  Multiple views of the left hip dated 12/6/23 were available for my independent interpretation in our PACS system and demonstrate interval femoroplasty and acetabuloplasty, also likely left subspine decompression with no evidence of residual or recurrent impingement lesion. No significant degenerative changes, no heterotopic bone formation, fracture, or AVN          ASSESSMENT & PLAN:    23F  with recurrent left hip pain s/p arthroscopy in May 2023, exam consistent with iliopsoas tendinopathy/bursitis    Recommend short course of scheduled NSAIDs, meloxicam prescribed. Take this scheduled for 2 weeks, prn thereafter.     Work with ATC and strength team on hip flexor eccentrics. OK to continue hockey activities. If pain persists despite objective improvements in hip flexor, iliopsoas strength then may consider further imaging to assess labrum integrity.      Options for treatment and/or follow-up care were reviewed with the patient and , Kenya Meredith ATC. Patient was actively involved in the decision making process. Patient verbalized understanding and was in agreement with the plan.    Kandy Villalobos MD  Orthopedic Surgery

## 2023-12-07 NOTE — LETTER
12/7/2023      RE: Manda Lincoln  [address]  [place] [address]     Dear Colleague,    Thank you for referring your patient, Manda Lincoln, to the [facility] CLIN[facility]. Please see a copy of my visit note below.    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 She has a history of bilateral hip arthroscopy with labral repair, cam resection, acetabuloplasty by Dr. Valdez Freed at [facility] in April 2023 (right) and May 2023 (left). She had been doing well but reports onset of left anterior groin pain over the past 6 days, following a game. Denies any trauma or change in activity. Does endorse painless snapping.     There is no problem list on file for this patient.      Current Outpatient Medications   Medication Sig Dispense Refill     escitalopram (LEXAPRO) 10 MG tablet Take 1 tablet (10 mg) by mouth daily 90 tablet 0     escitalopram (LEXAPRO) 20 MG tablet Take 1 tablet (20 mg) by mouth daily 90 tablet 0     meloxicam (MOBIC) 15 MG tablet Take 1 tablet (15 mg) by mouth daily 30 tablet 1               OBJECTIVE:     BMI: There is no height or weight on file to calculate BMI.    GENERAL:  The patient is pleasant and cooperative today and appears her stated age.   She is alert and oriented x3 and in no acute distress.    HENT:  Appearance symmetric with no discoloration or masses.  External ears and nose normal without lesion or deformity.    EYES: Pupils equal and round.  Conjunctivae and lids normal.    RESPIRATORY:  Breathing is unlabored without any audible wheezing.  NEURO/PSY: Normal affect. She shows no signs of agitation or depression.   CARDIOVASC: Well perfused extremities  SKIN: Normal temperature, turgor, and texture; no rash.  MUSCULOSKELETAL:    left Hip  Gait: Normal    Tenderness: None         Hip Range of Motion:   Flexion  Extension IR ER   Affected 120 NT 30 50   Unaffected 120 NT 30 50     Hip Strength (out of 5):   Flexion Extension Abduction Adduction   Affected 4/5 5/5 5/5 5/5   Unaffected 5/5 5/5 5/5 5/5   Iliopsoas: 4/5 left, 5/5 right    Provocative Tests:    Flexion/Adduction/Internal Rotation (FADIR) Test for Labral Tear (Impingement Sign) Positive    Stinchfield Test: Positive    Log Roll Test Negative    NIKOS Test Negative        Distally the patient's neurovascular status is  normal with sensation intact to light touch superficial peroneal, deep peroneal, sural, saphenous, and tibial nerve distributions. Motor grossly intact EHL, Tibialis anterior, gastroc-soleus complex    Limb is  well perfused with palpable pulses and brisk capillary refill <2 seconds       IMAGING:  Multiple views of the left hip dated 12/6/23 were available for my independent interpretation in our PACS system and demonstrate interval femoroplasty and acetabuloplasty, also likely left subspine decompression with no evidence of residual or recurrent impingement lesion. No significant degenerative changes, no heterotopic bone formation, fracture, or AVN          ASSESSMENT & PLAN:    23F  with recurrent left hip pain s/p arthroscopy in May 2023, exam consistent with iliopsoas tendinopathy/bursitis    Recommend short course of scheduled NSAIDs, meloxicam prescribed. Take this scheduled for 2 weeks, prn thereafter.     Work with ATC and strength team on hip flexor eccentrics. OK to continue hockey activities. If pain persists despite objective improvements in hip flexor, iliopsoas strength then may consider further imaging to assess labrum integrity.      Options for treatment and/or follow-up care were reviewed with the patient and , Kenya Meredith ATC. Patient was actively involved in the decision making process. Patient verbalized understanding and was in agreement with the  plan.    Kandy Villalobos MD  Orthopedic Surgery       Again, thank you for allowing me to participate in the care of your patient.      Sincerely,    Kandy Villalobos MD

## 2024-01-02 DIAGNOSIS — M25.552 LEFT HIP PAIN: Primary | ICD-10-CM

## 2024-01-03 ENCOUNTER — ANCILLARY PROCEDURE (OUTPATIENT)
Dept: MRI IMAGING | Facility: CLINIC | Age: 24
End: 2024-01-03
Attending: ORTHOPAEDIC SURGERY
Payer: COMMERCIAL

## 2024-01-03 DIAGNOSIS — M25.552 LEFT HIP PAIN: ICD-10-CM

## 2024-01-03 PROCEDURE — 73721 MRI JNT OF LWR EXTRE W/O DYE: CPT | Mod: LT | Performed by: RADIOLOGY

## 2024-01-04 ENCOUNTER — VIRTUAL VISIT (OUTPATIENT)
Dept: ORTHOPEDICS | Facility: CLINIC | Age: 24
End: 2024-01-04
Payer: COMMERCIAL

## 2024-01-04 DIAGNOSIS — S73.192D TEAR OF LEFT ACETABULAR LABRUM, SUBSEQUENT ENCOUNTER: Primary | ICD-10-CM

## 2024-01-04 NOTE — LETTER
1/4/2024      RE: Manda Lincoln  [address]  [place] [address]     Dear Colleague,    Thank you for referring your patient, Manda Lincoln, to the [facility]. Please see a copy of my visit note below.    6-7/10 sharp jolts    [facility] Visit    This visit was completed via a video platform. Patient consented to video visit. This was completed along with her team's ATC Kenya Meredith.          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 She reports unchanged pain since last visit with continued left anterior groin pain. She has been working with Kenya Meredith ATC on rehab, taking scheduled NSAIDs, and she took 3 weeks off from skating/hockey over Winter break but still failed to notice any improvement in her pain. This does feel different than her pain before her previous surgery in that it's not so much a constant pain but rather intermittent very sharp jolts of pain with movement. She notices this with skating as well as even with walking or ADLs. The pain gets up to a 6-7/10 in severity.    There is no problem list on file for this patient.      Current Outpatient Medications   Medication Sig Dispense Refill    escitalopram (LEXAPRO) 10 MG tablet Take 1 tablet (10 mg) by mouth daily 90 tablet 0    escitalopram (LEXAPRO) 20 MG tablet Take 1 tablet (20 mg) by mouth daily 90 tablet 0    meloxicam (MOBIC) 15 MG tablet Take 1 tablet (15 mg) by mouth daily 30 tablet 1              OBJECTIVE:   Exam deferred - MRI results visit.    MRI of the left hip completed 1/3/2024 was available for my independent interpretation in PACS and demonstrates anterosuperior acetabular labral tear. Thinning of adjacent anterosuperior acetabular cartilage without any high grade or full thickness cartilage loss, no central cartilage loss, no subchondral cyst formation. Joint  effusion is present.    MRI of the left hip completed 10/20/2022 was available for my independent interpretation in [facility] PACS and demonstrates antersuperior acetabular labrum irregularity which appears to extend over a larger area than is seen on the more recent 1/3/2024 imaging study.            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 Freed in May 2023.    I reviewed the imaging findings with her. There is evidence of recurrent labral tear. No clear evidence of residual bony impingement lesions on plain radiographs or MRI, but would recommend CT scan to evaluate this more comprehensively if considering revision surgery.     Discussed the option of continued NSAIDs, rehabilitation with ATC/PT, consideration of steroid injection (including a discussion of the risks including chondrotoxicity, AVN), or revision surgery.    Explained that revision surgery would entail hip arthroscopy, revision labral repair versus allograft labral reconstruction. Possibly also femoroplasty, pending evaluation of CT scan. Based on her MRI, she does not appear to have labral deficiency or significant issues of labral quality so I would suspect revision repair is more likely, but would be prepared for labral reconstruction if labral tissue quality or size was a concern.     Reviewed recovery protocol:  3 weeks on crutches (flat foot weight bearing) for repair, 4 weeks for reconstruction  Return to skate/run typically around 3 months post-op  Full unrestricted return to sport 4-6+ months depending on functional assessments     Discussed surgery is an option, can be considered in post-season if she feels she can play with current symptom level. She will follow up TBD based on surgical decision making.    Options for treatment and/or follow-up care were reviewed with the patient and Athletic  TrainerKenya, DELMY. Patient was actively involved in the decision making process. Patient verbalized understanding and was in agreement with the plan.    Kandy Villalobos MD  Orthopedic Surgery

## 2024-01-10 ENCOUNTER — DOCUMENTATION ONLY (OUTPATIENT)
Dept: FAMILY MEDICINE | Facility: CLINIC | Age: 24
End: 2024-01-10

## 2024-01-10 ENCOUNTER — OFFICE VISIT (OUTPATIENT)
Dept: ORTHOPEDICS | Facility: CLINIC | Age: 24
End: 2024-01-10
Payer: COMMERCIAL

## 2024-01-10 ENCOUNTER — ANCILLARY PROCEDURE (OUTPATIENT)
Dept: GENERAL RADIOLOGY | Facility: CLINIC | Age: 24
End: 2024-01-10
Attending: PEDIATRICS
Payer: COMMERCIAL

## 2024-01-10 DIAGNOSIS — M25.562 ARTHRALGIA OF LEFT LOWER LEG: Primary | ICD-10-CM

## 2024-01-10 DIAGNOSIS — M25.562 ARTHRALGIA OF LEFT LOWER LEG: ICD-10-CM

## 2024-01-10 DIAGNOSIS — S80.12XA CONTUSION OF LEFT LOWER EXTREMITY, INITIAL ENCOUNTER: Primary | ICD-10-CM

## 2024-01-10 PROCEDURE — 73590 X-RAY EXAM OF LOWER LEG: CPT | Mod: LT | Performed by: RADIOLOGY

## 2024-01-10 NOTE — LETTER
1/10/2024      RE: Manda Lincoln  [address]  [place] [address]     Dear Colleague,    Thank you for referring your patient, Manda Lincoln, to the [facility]. Please see a copy of my visit note below.    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 She noticed immediate bruising and swelling and has pain weightbearing but is able to do so.           Medications:     Current Outpatient Medications   Medication Sig    escitalopram (LEXAPRO) 10 MG tablet Take 1 tablet (10 mg) by mouth daily    escitalopram (LEXAPRO) 20 MG tablet Take 1 tablet (20 mg) by mouth daily    meloxicam (MOBIC) 15 MG tablet Take 1 tablet (15 mg) by mouth daily (Patient not taking: Reported on 1/10/2024)     No current facility-administered medications for this visit.             Allergies:      Allergies   Allergen Reactions    Amoxicillin     Penicillin G             Review of Systems:   A comprehensive 10 point review of systems (constitutional, ENT, cardiac, peripheral vascular, respiratory, GI, , Musculoskeletal, skin, Neurological) was performed and found to be negative except as described in this note.           Physical Exam:   COMPLETE EXAMINATION:   VITAL SIGNS: There were no vitals taken for this visit.  GEN: Alert, well-nourished, and in no distress.   NEURO: Alert and oriented to person, place, and time. Distal NVS exam normal  SKIN: No rash, warmth or erythema  PSYCH: Mood, memory, affect and judgment normal.   MSK: left lower leg: bruising and swelling over the anteromedial distal leg and directly over the tibia, but no pain with fulcrum test of the tibia; FROM at  ankle and knee, no pain with resisted motions         Imaging:     Narrative & Impression   2 views left tibia/fibula radiographs 1/10/2024 3:00 PM     History: Arthralgia of left lower leg      Comparison: None     Findings:     AP and lateral views of the left tibia/fibula were obtained.      No acute osseous abnormality.       Knee and ankle joints are incompletely assessed but grossly congruent.     Soft tissue is unremarkable.                                                                      Impression:     No acute osseous abnormality.     FRANCISCO LIVINGSTON MD (Joe)                    Assessment:     Left lower leg contusion        Plan:     Activity modifications and sx care measures.  If she develops more pain with weight bearing, consider CAM boot.  She may play without restriction for now. Anticipate gradual improvement with time, but if pain worsens or fails to improve, will need to limit impact and consider advanced imaging to rule out occult fracture.    Karina Najera M.D.    ATC Kenya Meredith was present for the entire visit.    Again, thank you for allowing me to participate in the care of your patient.      Sincerely,    Karina Najera MD

## 2024-01-10 NOTE — PROGRESS NOTES
[facility] A[facility]LET[facility]  Eliel ATC initial assessment note  Date of service performed: 1/10/2024    Concern: Acute injury  Body part: Lower leg  Description: Left  Injury: Contusion  Type: Athletics related  Date of injury: 1/10/2024    S: Alta got hit with a puck from a teammate's slapshot during practice today. Puck hit on her left tibia directly above the top of her skate. This happened at the end of practice and she came into ATR immediately following.     She is dealing with left hip labrum pain currently and has been doing rehab for that.    O: Immediately swelling and ecchymosis from where the puck struck. Tender on the lateral aspect of tibia at site of contusion as well as above and below the site. She is non-tender in her left ankle and on fibula.    She has full ROM and strength of her ankle. She does have pain when going up on toes. She is able to fully weight bear at this time.    A: Left tibia contusion. Rule out fracture.    P: Immediate treatment included ice and stim of area with elevation of lower leg. Kinesiotape and compression sleeve applied afterwards. Discussed with Dr. Najrea and will get x-ray to r/o fracture of tibia. This is scheduled for this afternoon.     Kenya Meredith, ATC

## 2024-01-10 NOTE — PROGRESS NOTES
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 She noticed immediate bruising and swelling and has pain weightbearing but is able to do so.           Medications:     Current Outpatient Medications   Medication Sig    escitalopram (LEXAPRO) 10 MG tablet Take 1 tablet (10 mg) by mouth daily    escitalopram (LEXAPRO) 20 MG tablet Take 1 tablet (20 mg) by mouth daily    meloxicam (MOBIC) 15 MG tablet Take 1 tablet (15 mg) by mouth daily (Patient not taking: Reported on 1/10/2024)     No current facility-administered medications for this visit.             Allergies:      Allergies   Allergen Reactions    Amoxicillin     Penicillin G             Review of Systems:   A comprehensive 10 point review of systems (constitutional, ENT, cardiac, peripheral vascular, respiratory, GI, , Musculoskeletal, skin, Neurological) was performed and found to be negative except as described in this note.           Physical Exam:   COMPLETE EXAMINATION:   VITAL SIGNS: There were no vitals taken for this visit.  GEN: Alert, well-nourished, and in no distress.   NEURO: Alert and oriented to person, place, and time. Distal NVS exam normal  SKIN: No rash, warmth or erythema  PSYCH: Mood, memory, affect and judgment normal.   MSK: left lower leg: bruising and swelling over the anteromedial distal leg and directly over the tibia, but no pain with fulcrum test of the tibia; FROM at ankle and knee, no pain with resisted motions         Imaging:     Narrative & Impression   2 views left tibia/fibula radiographs 1/10/2024 3:00 PM     History: Arthralgia of left lower leg      Comparison: None     Findings:     AP and lateral  views of the left tibia/fibula were obtained.      No acute osseous abnormality.       Knee and ankle joints are incompletely assessed but grossly congruent.     Soft tissue is unremarkable.                                                                      Impression:     No acute osseous abnormality.     FRANCISCO LIVINGSTON MD (Joe)                    Assessment:     Left lower leg contusion        Plan:     Activity modifications and sx care measures.  If she develops more pain with weight bearing, consider CAM boot.  She may play without restriction for now. Anticipate gradual improvement with time, but if pain worsens or fails to improve, will need to limit impact and consider advanced imaging to rule out occult fracture.    Karina Najera M.D.    DELMY Meredith was present for the entire visit.

## 2024-01-20 ENCOUNTER — DOCUMENTATION ONLY (OUTPATIENT)
Dept: FAMILY MEDICINE | Facility: CLINIC | Age: 24
End: 2024-01-20

## 2024-01-22 NOTE — PROGRESS NOTES
Is This A New Presentation, Or A Follow-Up?: Follow Up Basal Cell Carcinoma [facility] ATHLETICS  Eliel ATC initial assessment note  Date of service performed: 1/20/2024    Concern: Acute illness  Body part: Ankle  Description: Left  Injury: Allergic Reaction  Date of onset: 1/19/2024    S: Alta began developing an allergic reaction on her left ankle in late evening after our game on 1/19/2024. She first noticed it around 10:30 pm (game ending at 8:00 pm). She developed redness, swelling, pain, and hotness on the left ankle and foot. She has been dealing with a left tibia contusion on this same leg for a couple of weeks. For this issue, during the game, she used lidocaine roll on (which she's used before). After the game, she was given a new compression sleeve that was cut from the same roll she had used the day before, and iced the injury. She says she used her prescription Triamcinolone Acetonide Cream on it multiple times but did not notice any relief.    She told me about the reaction in the morning today. I saw her at 9:45 am when she got back to the hot after the team did a morning walk outside together. Her ankle was red, swollen, and hot to touch, including the part of her ankle that was exposed outside during their walk in the cold.    O: Left ankle and foot is swollen, red, some tenderness to touch. Denies it being itchy.    Her right ankle is slightly red, but this is likely due to her being outside.    No fever or other illness symptoms.    A: Allergic reaction on left ankle and foot.    P: Discussed via text with Dr. Najera, who put in a prescription for prednisone, which her parents picked up and Alta began at 11 am. She was also given claritin over benadryl since benadryl makes her sleepy and the game started at 2 pm.     She was able to play in game even with swelling in ankle and foot. She iced her tibia/ankle following the game.    Kenya Meredith, ATC

## 2024-01-24 ENCOUNTER — OFFICE VISIT (OUTPATIENT)
Dept: ORTHOPEDICS | Facility: CLINIC | Age: 24
End: 2024-01-24
Payer: COMMERCIAL

## 2024-01-24 VITALS
WEIGHT: 188.8 LBS | HEIGHT: 69 IN | HEART RATE: 80 BPM | SYSTOLIC BLOOD PRESSURE: 117 MMHG | DIASTOLIC BLOOD PRESSURE: 73 MMHG | BODY MASS INDEX: 27.96 KG/M2

## 2024-01-24 DIAGNOSIS — S80.12XA CONTUSION OF LEFT LEG, INITIAL ENCOUNTER: Primary | ICD-10-CM

## 2024-01-24 DIAGNOSIS — F41.8 DEPRESSION WITH ANXIETY: ICD-10-CM

## 2024-01-24 RX ORDER — ESCITALOPRAM OXALATE 10 MG/1
10 TABLET ORAL DAILY
Qty: 90 TABLET | Refills: 0 | Status: SHIPPED | OUTPATIENT
Start: 2024-01-24 | End: 2024-08-01

## 2024-01-24 RX ORDER — ESCITALOPRAM OXALATE 20 MG/1
20 TABLET ORAL DAILY
Qty: 90 TABLET | Refills: 0 | Status: SHIPPED | OUTPATIENT
Start: 2024-01-24 | End: 2024-08-01

## 2024-01-24 NOTE — PROGRESS NOTES
"[facility] CLINIC FOLLOW UP    Manda Lincoln MRN# [id number]   Age: 23 year old YOB: 2000           Chief Complaint:     Left leg contusion  Med refill          History of Present Illness:     24 [facility] [employer] [facility] injured her left lower leg when a puck struck the medial distal tibia on 1/10/24. She has been keeping it wrapped and tried lidocaine roll on, but she developed a contact dermatitis that was not responding to topical steroids, so PO prednisone prescribed and rash resolved. She continues to have pain with any impact. She can skate pain free. She notes that her hip bothers her more while skating than her lower leg does.     She also needs lexapro refill. Taking 30 mg with good effect for depression/anxiety. Mood good. Sleeping well. No side effects. Wants to continue.          Medications:     Current Outpatient Medications   Medication Sig    escitalopram (LEXAPRO) 10 MG tablet Take 1 tablet (10 mg) by mouth daily    escitalopram (LEXAPRO) 20 MG tablet Take 1 tablet (20 mg) by mouth daily     No current facility-administered medications for this visit.             Allergies:      Allergies   Allergen Reactions    Amoxicillin     Penicillin G             Review of Systems:   A comprehensive 10 point review of systems (constitutional, ENT, cardiac, peripheral vascular, respiratory, GI, , Musculoskeletal, skin, Neurological) was performed and found to be negative except as described in this note.           Physical Exam:   COMPLETE EXAMINATION:   VITAL SIGNS: /73   Pulse 80   Ht 1.753 m (5' 9\")   Wt 85.6 kg (188 lb 12.8 oz)   BMI 27.88 kg/m    GEN: Alert, well-nourished, and in no distress.   HEENT:   Head: Normocephalic and atraumatic.   Eyes: PERRLA, EOMI  Nose: no congestion or discharge  NEURO: Alert and oriented to person, place, and time. Gait normal.   SKIN: No rash, warmth or erythema  PSYCH: Mood, memory, affect and judgment normal.   MSK: left lower leg: bruising " and organized hematoma over distal medial tibia with TTP along tibia shaft, no pain with fulcrum test. Normal ROM knee and ankle, no pain with resisted motions of ankle.         Imaging:     Previous xr left tib fib normal          Assessment:     Left Tibia contusion, rule out fracture  Depression/anxiety        Plan:     MRI left tibiia/fibula  CAM boot with weight bearing  Compression stocking  May continue to skate if pain free  Refill lexapro 20 + 10 mg = 30 mg daily 3 month supply    Karina Najera M.D.    DELMY Meredith was present for the entire visit.

## 2024-01-24 NOTE — LETTER
"  1/24/2024      RE: Manda Lincoln  [address]  [place] [address]     Dear Colleague,    Thank you for referring your patient, Manda Lincoln, to the [facility] CLINIC. Please see a copy of my visit note below.    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 She has been keeping it wrapped and tried lidocaine roll on, but she developed a contact dermatitis that was not responding to topical steroids, so PO prednisone prescribed and rash resolved. She continues to have pain with any impact. She can skate pain free. She notes that her hip bothers her more while skating than her lower leg does.     She also needs lexapro refill. Taking 30 mg with good effect for depression/anxiety. Mood good. Sleeping well. No side effects. Wants to continue.          Medications:     Current Outpatient Medications   Medication Sig     escitalopram (LEXAPRO) 10 MG tablet Take 1 tablet (10 mg) by mouth daily     escitalopram (LEXAPRO) 20 MG tablet Take 1 tablet (20 mg) by mouth daily     No current facility-administered medications for this visit.             Allergies:      Allergies   Allergen Reactions     Amoxicillin      Penicillin G             Review of Systems:   A comprehensive 10 point review of systems (constitutional, ENT, cardiac, peripheral vascular, respiratory, GI, , Musculoskeletal, skin, Neurological) was performed and found to be negative except as described in this note.           Physical Exam:   COMPLETE EXAMINATION:   VITAL SIGNS: /73   Pulse 80   Ht 1.753 m (5' 9\")   Wt 85.6 kg (188 lb 12.8 oz)   BMI 27.88 kg/m    GEN: Alert, well-nourished, and in no distress.   HEENT:   Head: Normocephalic and " atraumatic.   Eyes: PERRLA, EOMI  Nose: no congestion or discharge  NEURO: Alert and oriented to person, place, and time. Gait normal.   SKIN: No rash, warmth or erythema  PSYCH: Mood, memory, affect and judgment normal.   MSK: left lower leg: bruising and organized hematoma over distal medial tibia with TTP along tibia shaft, no pain with fulcrum test. Normal ROM knee and ankle, no pain with resisted motions of ankle.         Imaging:     Previous xr left tib fib normal          Assessment:     Left Tibia contusion, rule out fracture  Depression/anxiety        Plan:     MRI left tibiia/fibula  CAM boot with weight bearing  Compression stocking  May continue to skate if pain free  Refill lexapro 20 + 10 mg = 30 mg daily 3 month supply    Karina Najera M.D.    DELMY Meredith was present for the entire visit.      Again, thank you for allowing me to participate in the care of your patient.      Sincerely,    Karina Najera MD

## 2024-01-31 ENCOUNTER — ANCILLARY PROCEDURE (OUTPATIENT)
Dept: MRI IMAGING | Facility: CLINIC | Age: 24
End: 2024-01-31
Attending: PEDIATRICS
Payer: COMMERCIAL

## 2024-01-31 DIAGNOSIS — S80.12XA CONTUSION OF LEFT LEG, INITIAL ENCOUNTER: ICD-10-CM

## 2024-01-31 PROCEDURE — 73718 MRI LOWER EXTREMITY W/O DYE: CPT | Mod: LT | Performed by: RADIOLOGY

## 2024-02-01 ENCOUNTER — DOCUMENTATION ONLY (OUTPATIENT)
Dept: FAMILY MEDICINE | Facility: CLINIC | Age: 24
End: 2024-02-01

## 2024-02-01 NOTE — PROGRESS NOTES
I spoke with Alta about imaging and Dr. Najera recommendations for treatment. I went over MRI results with her as well as Dr. Najera s recommendations. Our medical recommendation is to shut down all activity for the next 4-6 weeks to allow grade 3 bone stress injury to heal. If this timeline is not possible, our next recommendation would be to have her not play for a weekend/period of time to allow bone to heal. Alta was presented with all possibilities of what could occur should she decide to continue participating in hockey given her bony injury including but not limited to: further injuring the bone and causing longer to heal, continuing to damage the bone and causing a fracture which may or may not lead to a surgery to repair fractured tibia. I talked to Alta about the medical concerns of her continuing to play on it which include further injury to bone from skating, as well as further injury to bone if she sustains another puck contusion in same area.     At this time, Alta has decided to continue on ice participation. She will be taken out of all penalty killing/high risk drills at practice that would increase her likelihood of sustaining another puck contusion. She has had her shin pad altered to extend it to the area of injury, giving it more protection. She will be wearing non-contact red jersey at all practices. She has been given a walking boot which she is to wear at all times when not skating. She is not cleared for any lower body activity in the weight room. She may do upper body activity but must wear walking boot at all times.     She understands at any point she may choose to not participate in any drill at practice and/or not participate in hockey at all to allow bone to heal. She may tell medical staff or coaches at any point she is not comfortable and would like to stop any activity. Coaches have been informed of the injury and possible further injury should she continue to  participate in hockey. Coaching staff, Alta, and myself met on 2/1/2024 for discussion of injury after practice.    All of Alta s questions have been answered. She has chosen to continue to participate in hockey at this time with full understanding of the risk of further injury with continued participation.

## 2024-02-02 ENCOUNTER — OFFICE VISIT (OUTPATIENT)
Dept: ORTHOPEDICS | Facility: CLINIC | Age: 24
End: 2024-02-02
Payer: COMMERCIAL

## 2024-02-02 DIAGNOSIS — S80.12XA CONTUSION OF LEFT LEG, INITIAL ENCOUNTER: Primary | ICD-10-CM

## 2024-02-02 NOTE — LETTER
2/2/2024      RE: Manda Lincoln  [address]  [place] [address]     Dear Colleague,    Thank you for referring your patient, Manda Lincoln, to the [facility] CLINIC. Please see a copy of my visit note below.    [facility] CLINIC FOLLOW UP    Manda Lincoln MRN# [id number]   Age: 23 year old YOB: 2000           Chief Complaint:     MRI follow up          History of Present Illness:     No new concerns. Able to skate without pain. Wearing CAM boot when off ice.          Medications:     Current Outpatient Medications   Medication Sig     escitalopram (LEXAPRO) 10 MG tablet Take 1 tablet (10 mg) by mouth daily     escitalopram (LEXAPRO) 20 MG tablet Take 1 tablet (20 mg) by mouth daily     No current facility-administered medications for this visit.             Allergies:      Allergies   Allergen Reactions     Amoxicillin      Penicillin G             Review of Systems:   A comprehensive 10 point review of systems (constitutional, ENT, cardiac, peripheral vascular, respiratory, GI, , Musculoskeletal, skin, Neurological) was performed and found to be negative except as described in this note.             Imaging:     Narrative & Impression   MR left tibia/fibula without contrast 1/31/2024 10:31 AM     Techniques: Multiplanar multisequence imaging of the left tibia/fibula  was obtained without  administration of intra-articular or intravenous  contrast using routing protocol.     History: Contusion of left leg, initial encounter     Additional History from EMR: Women's  with left lower leg  injury following puck striking the medial distal tibia.      Comparison: Left lower leg radiograph 1/10/2024     Findings:     A marker is placed at anteromedial aspect of right  leg approximately  28.3 cm and 33.6 cm from knee joint line.     Between the external markers there is skin thickening with irregular  subcutaneous edema which extends to the deep investing  fascia along  the anterior and posterior compartments of the leg with subcutaneous  edema extending to the posterior medial ankle. No drainable fluid  collection.     Osseous structures     Stress fracture: Periosteal edema with intramedullary T1 signal  hypointensity with corresponding T2 signal hyperintensity along the  distal medial tibial shaft extending for a total distance of 6.5 cm.  Prominent intramedullary vascularity within the mid to distal tibia  shaft.     Osseous structures: No avascular necrosis, or focal osseous lesion is  seen.     Muscles  Muscles: The visualized muscles are unremarkable without edema or  atrophy.     Joint/Periarticular soft tissue     Internal derangement of joint assessment are limited owing to chosen  field of view.     Other Findings:  None.                                                                      Impression:  1. Fredericson grade 3 medial tibial stress syndrome of the right  tibia centered at the level of the contusion and extending a total  distance of 6.5 cm.     2. Subcutaneous contusion between the external markers without  drainable fluid collection.      I have personally reviewed the examination and initial interpretation  and I agree with the findings.     JUTTA ELLERMANN, MD              Assessment:     Left grade 3 MTSS/contusion        Plan:     1. Reviewed MRI results with athlete and discussed treatment recommendations. Ideally, complete rest from impact and immobilization in CAM boot would be recommended. However, Alta reports no pain while skating and wants to assume potential risks of continuing to skate and play. We discussed those risks in detail today which include prolonged recovery, worsening stress reaction or fracture that could require surgery. She is willing to assume those risks and continue to play for now. She understands that if pain should develop while skating, then she needs to discontinue immediately. She may participate in on  ice practice and games, but will avoid impact to leg during lift. See ATC note from 2/1/24 for more specific activity restrictions and methods to protect bone.   2. Continue CAM boot while not skating. Bone stimulator. Vit D supplementation.  3. Follow up closely. Repeat MRI in 3 months, sooner prn.    Karina Najera M.D.    ATC Kenya Meredith was present for the entire visit.

## 2024-02-02 NOTE — PROGRESS NOTES
[facility] CLINIC FOLLOW UP    Manda Lincoln MRN# [id number]   Age: 23 year old YOB: 2000           Chief Complaint:     MRI follow up          History of Present Illness:     No new concerns. Able to skate without pain. Wearing CAM boot when off ice.          Medications:     Current Outpatient Medications   Medication Sig     escitalopram (LEXAPRO) 10 MG tablet Take 1 tablet (10 mg) by mouth daily     escitalopram (LEXAPRO) 20 MG tablet Take 1 tablet (20 mg) by mouth daily     No current facility-administered medications for this visit.             Allergies:      Allergies   Allergen Reactions     Amoxicillin      Penicillin G             Review of Systems:   A comprehensive 10 point review of systems (constitutional, ENT, cardiac, peripheral vascular, respiratory, GI, , Musculoskeletal, skin, Neurological) was performed and found to be negative except as described in this note.             Imaging:     Narrative & Impression   MR left tibia/fibula without contrast 1/31/2024 10:31 AM     Techniques: Multiplanar multisequence imaging of the left tibia/fibula  was obtained without  administration of intra-articular or intravenous  contrast using routing protocol.     History: Contusion of left leg, initial encounter     Additional History from EMR: Women's  with left lower leg  injury following puck striking the medial distal tibia.      Comparison: Left lower leg radiograph 1/10/2024     Findings:     A marker is placed at anteromedial aspect of right  leg approximately  28.3 cm and 33.6 cm from knee joint line.     Between the external markers there is skin thickening with irregular  subcutaneous edema which extends to the deep investing fascia along  the anterior and posterior compartments of the leg with subcutaneous  edema extending to the posterior medial ankle. No drainable fluid  collection.     Osseous structures     Stress fracture: Periosteal edema with intramedullary T1  signal  hypointensity with corresponding T2 signal hyperintensity along the  distal medial tibial shaft extending for a total distance of 6.5 cm.  Prominent intramedullary vascularity within the mid to distal tibia  shaft.     Osseous structures: No avascular necrosis, or focal osseous lesion is  seen.     Muscles  Muscles: The visualized muscles are unremarkable without edema or  atrophy.     Joint/Periarticular soft tissue     Internal derangement of joint assessment are limited owing to chosen  field of view.     Other Findings:  None.                                                                      Impression:  1. Fredericson grade 3 medial tibial stress syndrome of the right  tibia centered at the level of the contusion and extending a total  distance of 6.5 cm.     2. Subcutaneous contusion between the external markers without  drainable fluid collection.      I have personally reviewed the examination and initial interpretation  and I agree with the findings.     JUTTA ELLERMANN, MD              Assessment:     Left grade 3 MTSS/contusion        Plan:     1. Reviewed MRI results with athlete and discussed treatment recommendations. Ideally, complete rest from impact and immobilization in CAM boot would be recommended. However, Alta reports no pain while skating and wants to assume potential risks of continuing to skate and play. We discussed those risks in detail today which include prolonged recovery, worsening stress reaction or fracture that could require surgery. She is willing to assume those risks and continue to play for now. She understands that if pain should develop while skating, then she needs to discontinue immediately. She may participate in on ice practice and games, but will avoid impact to leg during lift. See ATC note from 2/1/24 for more specific activity restrictions and methods to protect bone.   2. Continue CAM boot while not skating. Bone stimulator. Vit D supplementation.  3.  Follow up closely. Repeat MRI in 3 months, sooner prn.    Karina Najera M.D.    DELMY Meredith was present for the entire visit.

## 2024-02-16 ENCOUNTER — TELEPHONE (OUTPATIENT)
Dept: FAMILY MEDICINE | Facility: CLINIC | Age: 24
End: 2024-02-16

## 2024-02-16 DIAGNOSIS — F41.8 DEPRESSION WITH ANXIETY: Primary | ICD-10-CM

## 2024-02-16 RX ORDER — ESCITALOPRAM OXALATE 20 MG/1
30 TABLET ORAL DAILY
Qty: 6 TABLET | Refills: 0 | Status: SHIPPED | OUTPATIENT
Start: 2024-02-16 | End: 2024-03-09

## 2024-02-16 NOTE — TELEPHONE ENCOUNTER
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 Normally takes 30 mg and having withdrawal symptoms. Will send short supply of 20mg tablets and she can take 1.5 tabs while she is on the road as she just picked up recent refill so may not have insurance coverage.     Plan communicated by DELMY Meredith.

## 2024-02-23 DIAGNOSIS — M84.362D STRESS FRACTURE OF LEFT TIBIA WITH ROUTINE HEALING, SUBSEQUENT ENCOUNTER: Primary | ICD-10-CM

## 2024-02-24 ENCOUNTER — ANCILLARY PROCEDURE (OUTPATIENT)
Dept: GENERAL RADIOLOGY | Facility: CLINIC | Age: 24
End: 2024-02-24
Attending: PEDIATRICS
Payer: COMMERCIAL

## 2024-02-24 ENCOUNTER — OFFICE VISIT (OUTPATIENT)
Dept: ORTHOPEDICS | Facility: CLINIC | Age: 24
End: 2024-02-24
Payer: COMMERCIAL

## 2024-02-24 DIAGNOSIS — M84.362D STRESS FRACTURE OF LEFT TIBIA WITH ROUTINE HEALING, SUBSEQUENT ENCOUNTER: Primary | ICD-10-CM

## 2024-02-24 DIAGNOSIS — M84.362D STRESS FRACTURE OF LEFT TIBIA WITH ROUTINE HEALING, SUBSEQUENT ENCOUNTER: ICD-10-CM

## 2024-02-24 PROCEDURE — 73590 X-RAY EXAM OF LOWER LEG: CPT | Mod: LT | Performed by: RADIOLOGY

## 2024-02-24 NOTE — Clinical Note
2/24/2024      RE: Manda Lincoln  [address]  [place] [address]     Dear Colleague,    Thank you for referring your patient, Manda Lincoln, to the [facility] CLINIC. Please see a copy of my visit note below.    No notes on file    Again, thank you for allowing me to participate in the care of your patient.      Sincerely,    Diana An MD

## 2024-03-05 DIAGNOSIS — M25.561 ACUTE PAIN OF RIGHT KNEE: Primary | ICD-10-CM

## 2024-03-06 ENCOUNTER — ANCILLARY PROCEDURE (OUTPATIENT)
Dept: GENERAL RADIOLOGY | Facility: CLINIC | Age: 24
End: 2024-03-06
Attending: PEDIATRICS
Payer: COMMERCIAL

## 2024-03-06 ENCOUNTER — OFFICE VISIT (OUTPATIENT)
Dept: ORTHOPEDICS | Facility: CLINIC | Age: 24
End: 2024-03-06
Payer: COMMERCIAL

## 2024-03-06 DIAGNOSIS — M25.561 ACUTE PAIN OF RIGHT KNEE: ICD-10-CM

## 2024-03-06 DIAGNOSIS — M70.41 BURSITIS, PREPATELLAR, RIGHT: Primary | ICD-10-CM

## 2024-03-06 PROCEDURE — 73564 X-RAY EXAM KNEE 4 OR MORE: CPT | Mod: RT | Performed by: RADIOLOGY

## 2024-03-06 NOTE — PROGRESS NOTES
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 Since then, she has had persistent right knee pain and overlying soft tissue swelling. Pain only with pressure to the area. No mechanical sx or instability.          Medications:     Current Outpatient Medications   Medication Sig    escitalopram (LEXAPRO) 10 MG tablet Take 1 tablet (10 mg) by mouth daily    escitalopram (LEXAPRO) 20 MG tablet Take 1.5 tablets (30 mg) by mouth daily (Patient not taking: Reported on 2/24/2024)    escitalopram (LEXAPRO) 20 MG tablet Take 1 tablet (20 mg) by mouth daily     No current facility-administered medications for this visit.             Allergies:      Allergies   Allergen Reactions    Amoxicillin     Penicillin G             Review of Systems:   A comprehensive 10 point review of systems (constitutional, ENT, cardiac, peripheral vascular, respiratory, GI, , Musculoskeletal, skin, Neurological) was performed and found to be negative except as described in this note.           Physical Exam:   COMPLETE EXAMINATION:   VITAL SIGNS: There were no vitals taken for this visit.  GEN: Alert, well-nourished, and in no distress.   SKIN: No rash, warmth or erythema  PSYCH: Mood, memory, affect and judgment normal.   MSK: right knee: no effusion, + tender, small prepatellar bursa without warmth or erythema, FROM, ligaments stable, Jennifer -         Imaging:     Reading Physician Reading Date Result Priority   Carlos Heredia MD  [phone] 3/6/2024      Narrative & Impression  4 views right knee radiographs 3/6/2024 9:49 AM     History: Acute pain of right knee     Comparison: Left tibia/fibula 2/24/2024, left knee MR 2/26/2001     Findings:     Standing AP and PA tunnel view of  bilateral knees, and lateral and  patellofemoral views of the right knee were obtained.      Right:     No acute osseous abnormality.  Trace joint effusion.     Mild medial compartment joint space narrowing.     Insall-Salvati ratio measures 1.41, patella annette.     No patellar tilt or lateral subluxation.  Soft tissue is unremarkable.     Left:     Mild medial compartment joint space loss.                                                                      Impression:  1. No acute osseous abnormality.  2. Patellar annette.  3. Mild bilateral knee medial compartment joint space narrowing.     JAY ESCALERA             Assessment:     Right knee pain/contusion and prepatellar bursitis        Plan:     Sx care, voltaren prn.  Follow up prn.    Karina Najera M.D.    DELMY Meredith was present for the entire visit.

## 2024-03-06 NOTE — LETTER
3/6/2024      RE: Manda Lincoln  [address]  [place] [address]     Dear Colleague,    Thank you for referring your patient, Manda Lincoln, to the [facility]. Please see a copy of my visit note below.    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 Since then, she has had persistent right knee pain and overlying soft tissue swelling. Pain only with pressure to the area. No mechanical sx or instability.          Medications:     Current Outpatient Medications   Medication Sig    escitalopram (LEXAPRO) 10 MG tablet Take 1 tablet (10 mg) by mouth daily    escitalopram (LEXAPRO) 20 MG tablet Take 1.5 tablets (30 mg) by mouth daily (Patient not taking: Reported on 2/24/2024)    escitalopram (LEXAPRO) 20 MG tablet Take 1 tablet (20 mg) by mouth daily     No current facility-administered medications for this visit.             Allergies:      Allergies   Allergen Reactions    Amoxicillin     Penicillin G             Review of Systems:   A comprehensive 10 point review of systems (constitutional, ENT, cardiac, peripheral vascular, respiratory, GI, , Musculoskeletal, skin, Neurological) was performed and found to be negative except as described in this note.           Physical Exam:   COMPLETE EXAMINATION:   VITAL SIGNS: There were no vitals taken for this visit.  GEN: Alert, well-nourished, and in no distress.   SKIN: No rash, warmth or erythema  PSYCH: Mood, memory, affect and judgment normal.   MSK: right knee: no effusion, + tender, small prepatellar bursa without warmth or erythema, FROM, ligaments stable, Jennifer -         Imaging:     Reading Physician Reading Date Result Priority   Carlos Heredia MD  [phone]  3/6/2024      Narrative & Impression  4 views right knee radiographs 3/6/2024 9:49 AM     History: Acute pain of right knee     Comparison: Left tibia/fibula 2/24/2024, left knee MR 2/26/2001     Findings:     Standing AP and PA tunnel view of bilateral knees, and lateral and  patellofemoral views of the right knee were obtained.      Right:     No acute osseous abnormality.  Trace joint effusion.     Mild medial compartment joint space narrowing.     Insall-Salvati ratio measures 1.41, patella annette.     No patellar tilt or lateral subluxation.  Soft tissue is unremarkable.     Left:     Mild medial compartment joint space loss.                                                                      Impression:  1. No acute osseous abnormality.  2. Patellar annette.  3. Mild bilateral knee medial compartment joint space narrowing.     JAY ESCALERA             Assessment:     Right knee pain/contusion and prepatellar bursitis        Plan:     Sx care, voltaren prn.  Follow up prn.    Karina Najera M.D.    DELMY Meredith was present for the entire visit.      Again, thank you for allowing me to participate in the care of your patient.      Sincerely,    Karina Najera MD

## 2024-03-19 NOTE — PROGRESS NOTES
CHIEF CONCERN:  Left leg injury    HISTORY OF PRESENT ILLNESS:  Alta is a 23 year old female seen for follow up of an injury to her left lower leg. She was seen by Dr. Najera on 2/2 for the injury sustained in January. Radiographs and an MRI have been obtained. Alta's injury has been managed with immobilization in a CAM boot when not skating as well as VIT D supplementation and a bone stim. She has been able to continue to skate with her ability not limited by pain. She reports she continues to be able participate in games without limitation.    PHYSICAL EXAM:    Adult female in no distress  Focused lower extremity exam: Skin intact. Area of swelling over medial distal leg at site of her previous hematoma - this has been softening and decreasing in size. Some tenderness to palpation over swelling. No pain on palpation over tibia other than localizing over previous hematoma. Able to stand and walk without assistive device. Full ankle ROM.     IMAGING:  Previous Tib/Fib XRays and Left distal tibia MRI were reviewed. Radiology Impression noted below:  Impression:  1. Fredericson grade 3 medial tibial stress syndrome of the right  tibia centered at the level of the contusion and extending a total  distance of 6.5 cm.  2. Subcutaneous contusion between the external markers without  drainable fluid collection.     ASSESSMENT:  Left distal tibia medial tibial stress syndrome    PLAN:  I reviewed with Alta the findings from her exam. We reviewed the risks of ongoing activity. We discussed the management of playing or skating without pain compared to attempting to skate with pain that limits weight bearing or skating at speed. Alta does not feel discomfort limits her ability to skate. We discussed ongoing use of the bone stimulator and monitor symptoms. If she does not have pain with weightbearing in the skate or while skating she may continue. We discussed the risk of fracture if she skates with pain. At minimum  we would continue to monitor her symptoms and obtain new imaging pending symptoms.Alta expressed understanding and will keep us apprised of her symptoms.    Diana An MD

## 2024-03-28 DIAGNOSIS — M84.362D STRESS FRACTURE OF LEFT TIBIA WITH ROUTINE HEALING, SUBSEQUENT ENCOUNTER: Primary | ICD-10-CM

## 2024-04-01 ENCOUNTER — ANCILLARY PROCEDURE (OUTPATIENT)
Dept: MRI IMAGING | Facility: CLINIC | Age: 24
End: 2024-04-01
Attending: PEDIATRICS
Payer: COMMERCIAL

## 2024-04-01 DIAGNOSIS — M84.362D STRESS FRACTURE OF LEFT TIBIA WITH ROUTINE HEALING, SUBSEQUENT ENCOUNTER: ICD-10-CM

## 2024-04-01 PROCEDURE — 73718 MRI LOWER EXTREMITY W/O DYE: CPT | Mod: LT | Performed by: RADIOLOGY

## 2024-04-03 ENCOUNTER — DOCUMENTATION ONLY (OUTPATIENT)
Dept: FAMILY MEDICINE | Facility: CLINIC | Age: 24
End: 2024-04-03

## 2024-04-03 ENCOUNTER — DOCUMENTATION ONLY (OUTPATIENT)
Dept: ORTHOPEDICS | Facility: CLINIC | Age: 24
End: 2024-04-03

## 2024-04-03 NOTE — PROGRESS NOTES
Repeat left tib-fib MRI reveals healed tibia stress reaction and diminished soft tissue swelling.  May advance activity as tolerated.    Karina Najera MD

## 2024-04-05 DIAGNOSIS — R11.0 NAUSEA: Primary | ICD-10-CM

## 2024-04-05 RX ORDER — ONDANSETRON 4 MG/1
4 TABLET, ORALLY DISINTEGRATING ORAL EVERY 6 HOURS PRN
Qty: 20 TABLET | Refills: 0 | Status: SHIPPED | OUTPATIENT
Start: 2024-04-05

## 2024-04-05 NOTE — PROGRESS NOTES
[facility] ATHLETICS  Eliel ATC initial assessment note  Date of service performed: 4/3/2024    Concern: Acute illness  Illness: Nausea and Vomiting  Date of onset: 4/3/2024    Alta texted me after she had finished spring training lift reporting the following symptoms: nausea, headache, chills, body aches. Reports this feels different than when she had influenza last year.     P: Recommended testing for influenza at [facility] which she declined to do. Instructed to push fluids and eat bland foods as tolerated. Will check in tomorrow before lift, but if symptoms persist then will have her avoid lift.     Date of service performed: 4/4/2024  Alta continues to have GI symptoms including nausea and vomiting. Her other symptoms (headache, chills, body aches) stopped yesterday.     Not cleared for lift. Recommended continued fluid push and eating bland foods.    Date of service performed: 4/5/2024  Alta reached out via text saying she continues to have the following symptoms: stomach cramping, vomiting this morning, and nausea. She has been unable to eat much. She is trying to drink fluids but feels that she is likely dehydrated. I communicated this with Dr. Palomo who put in a prescription for zofran.    Not cleared for lift. Continue fluid push and eating bland foods. Will check in tomorrow.    Kenya Meredith, ATC

## 2024-04-05 NOTE — CONFIDENTIAL NOTE
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 Will send in zofran Rx to help with nausea and vomiting and improve oral intake.     Sol Palomo MD

## 2024-04-08 ENCOUNTER — OFFICE VISIT (OUTPATIENT)
Dept: FAMILY MEDICINE | Facility: CLINIC | Age: 24
End: 2024-04-08
Payer: COMMERCIAL

## 2024-04-08 VITALS
WEIGHT: 183.7 LBS | BODY MASS INDEX: 26.3 KG/M2 | SYSTOLIC BLOOD PRESSURE: 117 MMHG | DIASTOLIC BLOOD PRESSURE: 77 MMHG | HEIGHT: 70 IN | HEART RATE: 83 BPM

## 2024-04-08 DIAGNOSIS — K52.9 GASTROENTERITIS: Primary | ICD-10-CM

## 2024-04-08 NOTE — PROGRESS NOTES
"S:25 yo  female  presents with:  Woke up with mild nausea on Wed last week  Aches, chills and vomiting, HA.  Took temp and it was 99. Take some Tylenol.    Thursday: Stomach hurt and was able to eat a little  Friday:  worse with vomiting.  Prescribed Zofran.  It helped some. Felt lightheaded  Sat:  Vomiting stopped, nauseated still persist, dizzy  Sunday:  [place] gluten free muffin, plain pasta, drinking water, minimal dizziness.  Today feeling better than any of the days since last week.   No congestion, ST, runny nose, no diarrhea  No concerns about food poisoning  No NSAIDs, no etoh      Current Outpatient Medications   Medication Sig Dispense Refill    escitalopram (LEXAPRO) 10 MG tablet Take 1 tablet (10 mg) by mouth daily 90 tablet 0    escitalopram (LEXAPRO) 20 MG tablet Take 1 tablet (20 mg) by mouth daily 90 tablet 0    ondansetron (ZOFRAN ODT) 4 MG ODT tab Take 1 tablet (4 mg) by mouth every 6 hours as needed for nausea 20 tablet 0     No current facility-administered medications for this visit.           O: Appears tired  /77   Pulse 83   Ht 1.778 m (5' 10\")   Wt 83.3 kg (183 lb 11.2 oz)   LMP 03/25/2024   BMI 26.36 kg/m    HEENT:  Neg, moist mucous membranes  Neck: no lad  Lungs; cta  Heart; rrr without m/g  Abd:  Increased BS x 4, soft and nttp, no g/r  Skin:  no rash, nl tugor      A:  Probable norovirus:  improving    P:  Discussed oral hydration.   No indication for an IV at this time.  Ok to use 2 Zofran every 8 hours for the next two days.  Eat about 30 min after taking it.  Will add apple juice, Powerade diluted for oral fluids. Can also use Pedialyte and or liquid IV.  No practice until able to eat meals and feeling better.  Hopefully she will be ready by Wednesday this week.   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    Guadalupe Tomlin ATC for  Swimming and Diving was present for the entire appt.     Lacey Coon MD, CAQ, FACSM, CCD  [facility]" [place]  Sports Medicine and Bone Health  Team Physician;  Athletics

## 2024-04-08 NOTE — LETTER
"  4/8/2024      RE: Manda Lincoln  [address]  [place] [address]     Dear Colleague,    Thank you for referring your patient, Manda Lincoln, to the [facility]. Please see a copy of my visit note below.    S:23 yo [employer] female  presents with:  Woke up with mild nausea on Wed last week  Aches, chills and vomiting, HA.  Took temp and it was 99. Take some Tylenol.    Thursday: Stomach hurt and was able to eat a little  Friday:  worse with vomiting.  Prescribed Zofran.  It helped some. Felt lightheaded  Sat:  Vomiting stopped, nauseated still persist, dizzy  Sunday:  [place] gluten free muffin, plain pasta, drinking water, minimal dizziness.  Today feeling better than any of the days since last week.   No congestion, ST, runny nose, no diarrhea  No concerns about food poisoning  No NSAIDs, no etoh      Current Outpatient Medications   Medication Sig Dispense Refill     escitalopram (LEXAPRO) 10 MG tablet Take 1 tablet (10 mg) by mouth daily 90 tablet 0     escitalopram (LEXAPRO) 20 MG tablet Take 1 tablet (20 mg) by mouth daily 90 tablet 0     ondansetron (ZOFRAN ODT) 4 MG ODT tab Take 1 tablet (4 mg) by mouth every 6 hours as needed for nausea 20 tablet 0     No current facility-administered medications for this visit.           O: Appears tired  /77   Pulse 83   Ht 1.778 m (5' 10\")   Wt 83.3 kg (183 lb 11.2 oz)   LMP 03/25/2024   BMI 26.36 kg/m    HEENT:  Neg, moist mucous membranes  Neck: no lad  Lungs; cta  Heart; rrr without m/g  Abd:  Increased BS x 4, soft and nttp, no g/r  Skin:  no rash, nl tugor      A:  Probable norovirus:  improving    P:  Discussed oral hydration.   No indication for an IV at this time.  Ok to use 2 Zofran every 8 hours for the next two days.  Eat about 30 min after taking it.  Will add apple juice, Powerade diluted for oral fluids. Can also use Pedialyte and or liquid IV.  No practice until able to eat meals and feeling better.  " Chief complaint:   Chief Complaint   Patient presents with   • Shoulder Pain     ongoing pain left shoulder- has seen ortho and had xray; fell yesterday now increased pain   • Other     TMJ pain - no longer seeing pain management due to insurance.        Vitals:  Visit Vitals   • /80 (BP Location: [facility], Patient Position: Sitting)   • Pulse 102   • Temp 98.1 °F (36.7 °C) (Oral)   • Wt 73.1 kg   • LMP 05/30/2001   • SpO2 99%   • BMI 29.48 kg/m2       HISTORY OF PRESENT ILLNESS     HPI  Left shoulder pain initially tells me about 3 mos ago, upon ortho notes review it was 5 mos ago  Has had steroid injection for this on 1/29/17 so 3 months ago but no improvement  Was thought to have to have bursitis by ortho, PT rec'd, pt hasn't pursued  Has been seeing massage therapist with some improvement  But fell yesterday and re-injured it, now has ore outer shoulder pain    Other significant problems:  Patient Active Problem List    Diagnosis Date Noted   • Elevated BP 08/29/2013     Priority: High   • Tobacco use disorder 08/29/2013     Priority: High   • Bipolar affective disorder, currently active 06/04/2012     Priority: High     Counselor through [facility], as of 11/15 seeing psychiatry as well     • Esophageal reflux 06/04/2012     Priority: High   • Other and unspecified hyperlipidemia 09/15/2011     Priority: High   • Gait disturbance 08/29/2013     Priority: Medium     S/p benign neuro eval     • Leukocytosis 08/29/2013     Priority: Medium   • Cervicalgia 04/04/2013     Priority: Medium   • Multiple thyroid nodules 08/08/2012     Priority: Medium     Elizabeth-repeat US ordered for 6/2016     • PUD (peptic ulcer disease) 06/04/2012     Priority: Medium     remote     • Skin lesion back and right forearm 09/28/2015     Priority: Low   • Low back pain radiating to both legs 08/29/2013     Priority: Low   • Weight gain 08/29/2013     Priority: Low   • Status post cervical spinal arthrodesis 04/04/2013      Hopefully she will be ready by Wednesday this week.   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    Guadalupe Tomlin, ATC for  Swimming and Diving was present for the entire appt.     Lacey Coon MD, CAQ, FACSM, CCD  [facility]  Sports Medicine and Bone Health  Team Physician;  Athletics         Priority: Low   • RAD (reactive airway disease) 07/21/2012     Priority: Low   • Allergic rhinitis, cause unspecified 07/21/2012     Priority: Low   • Possible Medial meniscus tear 03/01/2012     Priority: Low   • Unspecified constipation 09/15/2011     Priority: Low       PAST MEDICAL, FAMILY AND SOCIAL HISTORY     Medications:  Current Outpatient Prescriptions   Medication   • topiramate (TOPAMAX) 25 MG tablet   • ibuprofen (MOTRIN) 600 MG tablet   • tiZANidine (ZANAFLEX) 4 MG tablet   • LORazepam (ATIVAN) 1 MG tablet   • amitriptyline (ELAVIL) 100 MG tablet   • lamoTRIgine (LAMICTAL) 150 MG tablet   • amphetamine-dextroamphetamine (ADDERALL) 20 MG tablet   • traMADOL (ULTRAM) 50 MG tablet   • Ascorbic Acid (VITAMIN C) 500 MG tablet   • gabapentin (NEURONTIN) 400 MG capsule   • Multiple Vitamins-Minerals (MULTIVITAMIN ADULT PO)   • Omega-3 Fatty Acids (FISH OIL) 1200 MG capsule   • Glucosamine HCl (GLUCOSAMINE PO)   • atorvastatin (LIPITOR) 80 MG tablet   • gemfibrozil (LOPID) 600 MG tablet   • fluticasone (FLONASE) 50 MCG/ACT nasal spray   • Thiamine HCl (VITAMIN B-1) 100 MG tablet   • ergocalciferol (DRISDOL) 82771 UNITS capsule   • omeprazole (PRILOSEC) 20 MG capsule   • diclofenac (VOLTAREN) 1 % gel   • PREMARIN 0.625 MG tablet   • albuterol (VENTOLIN) 108 (90 BASE) MCG/ACT inhaler     No current facility-administered medications for this visit.        Allergies:  ALLERGIES:   Allergen Reactions   • Morphine Nausea & Vomiting     Symptoms possibly due to anesthesia given for surg.         Past Medical  History/Surgeries:  Past Medical History:   Diagnosis Date   • Anxiety    • Cervical cancer    • Dizziness    • Dysplasia of cervix (uteri)     Dysplasia - HSIL conization   • Essential (primary) hypertension    • Fibromyalgia    • Gait disturbance     legs were wobbly - improved with PT   • Head injury 1989    hit head; no evaluation   • Hiatal hernia    • Kidney stones    • Loss of sensation     on one side of  body on 2 occasions   • Migraines    • Osteoporosis    • Other chronic pain     bulging disc   • Seizure last one in 2005    one time occurrence afte head trauma   • TIA (transient ischemic attack)     2 episodes, partial paralysis   • Unspecified sinusitis (chronic)     seasonal allergies   • Urinary incontinence    • Vocal cord nodule        Past Surgical History:   Procedure Laterality Date   • APPENDECTOMY  age 17   • BUNIONECTOMY  2007    R foot   • CERVICAL CONIZATION   W/ LASER      HSIL conization    • COLONOSCOPY DIAGNOSTIC  06/22/12    AFFI 1YR RECALL TUBULAR ADENOMA   • COLONOSCOPY DIAGNOSTIC  8/2/2013    Affi 5yr recall, polyp tubular adenoma 2018   • COLONOSCOPY REMOVE LESION BY SNARE  02/05/2010   • D AND C  1990    D&C   • ESOPHAGEAL MOTILITY STUDY  10/21/15    Dr. Garcia, read for Dr. Arredondo - Hypercontractile Esophagus   • ESOPHAGOGASTRODUODENOSCOPY TRANSORAL FLEX W/BX SINGLE OR MULT  8/7/15    Dysphagia, bx's no EE   [facility]   • GYNECOLOGIC CRYOSURGERY      D & C   • HYSTERECTOMY  1999    both ovaries in   • IR LYMPH NODE/THYROID BIOPSY  1/11/13   • LAPAROSCOPY,ABDM,RUBEN,OMENT,DX      Laparoscopy x 2   • PAP SMEAR, ROUTINE (INC [id number])  11/25/2009   • PAST SURGICAL HISTORY  10/25/12    PSH of  microlaryngoscopy with removal of vocal cord lesion   • SPINAL FUSION  11/21/12    cervical  C5-C6   C6-C7 anterior diskectomy and fusion   • THYROID SURGERY  2012       Family History:  Family History   Problem Relation Age of Onset   • Cancer Mother 53     end stage metastatic cancer, dx'd 50s   • Cancer Father      prostate cancer   • Heart disease Father      onset his 50s/60s   • COPD Father    • High blood pressure Father    • High cholesterol Father    • High cholesterol Sister    • Cancer Maternal Aunt      breast CA, dx'd in her 50s   • Cancer Maternal Aunt 70     bladder CA, dx'd @ 62 y/o   • Thyroid Sister    • High cholesterol Brother    • High cholesterol Brother     • High cholesterol Brother    • High cholesterol Brother    • Heart Other      strong paternal side       Social History:  Social History   Substance Use Topics   • Smoking status: Current Every Day Smoker     Packs/day: 0.30     Years: 34.00     Types: Cigarettes   • Smokeless tobacco: Never Used      Comment: pack is lasting about 3 days   • Alcohol use 2.4 - 3.6 oz/week     4 - 6 Standard drinks or equivalent per week      Comment: \"few drinks twice a week\"       REVIEW OF SYSTEMS     Review of Systems    PHYSICAL EXAM     Physical Exam    ASSESSMENT/PLAN     ***

## 2024-06-17 NOTE — PROGRESS NOTES
[facility] ATHLETICS  Eliel ATC follow-up note  Date of service performed: 4/10/2024    Concern/injury: Nausea/vomiting    Alta is doing much better today. She is able to keep food and liquids down, though she is still eating less than she normally would. She is cleared to participate in Spring Training. I sent a message to Flaco Wilson saying she should ease back into activity, as she has been out for a week and still recovering from the stomach illness.      Kenya Meredith, ATC

## 2024-06-17 NOTE — PROGRESS NOTES
[facility] ATHLETICS  Eliel ATC follow-up note  Date of service performed: 4/8/2024    Concern/injury: Nausea/vomiting    Patient saw Dr. Coon today for follow up. She is feeling slightly better but not 100%. She should continue to take zofran. Will be allowed to resume Spring training lift once she is able to keep foods and liquids down.      Kenya Meredith, ATC

## 2024-08-01 DIAGNOSIS — F41.8 DEPRESSION WITH ANXIETY: ICD-10-CM

## 2024-08-01 RX ORDER — ESCITALOPRAM OXALATE 20 MG/1
20 TABLET ORAL DAILY
Qty: 90 TABLET | Refills: 0 | Status: SHIPPED | OUTPATIENT
Start: 2024-08-01

## 2024-08-01 RX ORDER — ESCITALOPRAM OXALATE 10 MG/1
10 TABLET ORAL DAILY
Qty: 90 TABLET | Refills: 0 | Status: SHIPPED | OUTPATIENT
Start: 2024-08-01

## 2024-10-04 ENCOUNTER — DOCUMENTATION ONLY (OUTPATIENT)
Dept: FAMILY MEDICINE | Facility: CLINIC | Age: 24
End: 2024-10-04

## 2024-10-04 ENCOUNTER — OFFICE VISIT (OUTPATIENT)
Dept: ORTHOPEDICS | Facility: CLINIC | Age: 24
End: 2024-10-04
Payer: COMMERCIAL

## 2024-10-04 DIAGNOSIS — S49.91XA INJURY OF RIGHT ACROMIOCLAVICULAR JOINT, INITIAL ENCOUNTER: Primary | ICD-10-CM

## 2024-10-04 DIAGNOSIS — M25.551 PAIN OF RIGHT HIP: Primary | ICD-10-CM

## 2024-10-04 NOTE — PROGRESS NOTES
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 She went into the boards with the point of her right shoulder and had immediate pain over the right shoulder. She came out of the game and was seen by me with another team physician (Dr. Sol Palomo). She denies any pain in her head or neck.    PHYSICAL EXAM:  Young adult female in mild distress due to shoulder pain  Respirations even and unlabored.  Demonstrates good flexion, extension and rotation of neck.  Focused upper extremity exam: Skin intact over right shoulder. She points to the superior aspect of the shoulder as the site of pain. No significant prominence or asymmetry. She has active right FE to 155 with pain at end range and bringing arm down to side, ER at side to 50 and IR to back pocket. Sets deltoid well. Pain exacerbated by cross body adduction. Motor intact to EPL,FPL, Intrinsincs. Tender to palpation at the AC joint. No pain over the biceps groove. No pain over scapular spine.     IMAGING: pending    ASSESSMENT:  Right AC joint injury    PLAN:  I reviewed with Alta (and then with her mother and father who arrived) the finding of pain localizing to the AC joint. I do not see evidence of grade 3 or higher separation given lack of asymmetry. She is not able to return to the game this evening. We will obtain imaging in the [facility] tomorrow am. She will have a sling for comfort but can do simple ROM as tolerated. No lift/push/pull at present.     Diana An MD

## 2024-10-05 ENCOUNTER — ANCILLARY PROCEDURE (OUTPATIENT)
Dept: GENERAL RADIOLOGY | Facility: CLINIC | Age: 24
End: 2024-10-05
Attending: ORTHOPAEDIC SURGERY
Payer: COMMERCIAL

## 2024-10-05 DIAGNOSIS — S49.91XA INJURY OF RIGHT ACROMIOCLAVICULAR JOINT, INITIAL ENCOUNTER: ICD-10-CM

## 2024-10-05 DIAGNOSIS — M25.551 PAIN OF RIGHT HIP: ICD-10-CM

## 2024-10-05 PROCEDURE — 73502 X-RAY EXAM HIP UNI 2-3 VIEWS: CPT | Mod: RT | Performed by: RADIOLOGY

## 2024-10-05 PROCEDURE — 73030 X-RAY EXAM OF SHOULDER: CPT | Mod: RT | Performed by: RADIOLOGY

## 2024-10-07 ENCOUNTER — ANCILLARY PROCEDURE (OUTPATIENT)
Dept: MRI IMAGING | Facility: CLINIC | Age: 24
End: 2024-10-07
Attending: STUDENT IN AN ORGANIZED HEALTH CARE EDUCATION/TRAINING PROGRAM
Payer: COMMERCIAL

## 2024-10-07 DIAGNOSIS — M25.551 RIGHT HIP PAIN: ICD-10-CM

## 2024-10-07 PROCEDURE — 73721 MRI JNT OF LWR EXTRE W/O DYE: CPT | Mod: RT | Performed by: RADIOLOGY

## 2024-10-08 ENCOUNTER — TELEPHONE (OUTPATIENT)
Dept: OPHTHALMOLOGY | Facility: CLINIC | Age: 24
End: 2024-10-08
Payer: COMMERCIAL

## 2024-10-08 NOTE — TELEPHONE ENCOUNTER
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    Pt seen by eye provider and recommended second opinion with retina provider.    Scheduled October 30th with Dr. Johnson at 0800 at [facility] location.     will confirm appt with pt and provide details date/time/location/duration/hospital based clinic/parking    Non-[employer] insurance.    Records will also be faxed to [phone]    Chance Ba RN 11:10 AM 10/08/24

## 2024-10-08 NOTE — TELEPHONE ENCOUNTER
[facility] Call Center    Phone Message    May a detailed message be left on voicemail: yes     Reason for Call: Appointment Intake    Referring Provider Name: Diana An MD   Diagnosis and/or Symptoms: Injury of right acromioclavicular joint, initial encounter [S49.91XA], Loss of peripheral vision    Pt'  is calling to schedule pt for loss of peripheral vision. Per protocol writer to send HP TE for red flag symptoms with referrals.    For scheduling please reach out to Kenya, Pt's '    Kenya:[phone]    Action Taken: Message routed to:  [facility] eye    Travel Screening: Not Applicable     Date of Service:

## 2024-10-09 ENCOUNTER — VIRTUAL VISIT (OUTPATIENT)
Dept: ORTHOPEDICS | Facility: CLINIC | Age: 24
End: 2024-10-09
Payer: COMMERCIAL

## 2024-10-09 DIAGNOSIS — M25.852 FEMOROACETABULAR IMPINGEMENT OF LEFT HIP: Primary | ICD-10-CM

## 2024-10-09 RX ORDER — KETOROLAC TROMETHAMINE 10 MG/1
10 TABLET, FILM COATED ORAL EVERY 6 HOURS PRN
Qty: 20 TABLET | Refills: 0 | Status: SHIPPED | OUTPATIENT
Start: 2024-10-09

## 2024-10-09 NOTE — PROGRESS NOTES
[facility] Visit    This visit was conducted via video. 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 She has previously been seen by me for left hip pain at which time her right hip was doing well. She has a history right hip arthroscopy with labral repair, cam resection, acetabuloplasty by Dr. Valdez Freed at [facility] in April 2022. Over the past few months she has noticed onset of catching in the anterior groin with associated sharp pain. This is worse with turning the foot outwards    Of note, she also suffered an AC separation 10/4 for which she was evaluated by Dr. An.      There is no problem list on file for this patient.      Current Outpatient Medications   Medication Sig Dispense Refill    escitalopram (LEXAPRO) 10 MG tablet Take 1 tablet (10 mg) by mouth daily 90 tablet 0    escitalopram (LEXAPRO) 20 MG tablet Take 1 tablet (20 mg) by mouth daily 90 tablet 0    ondansetron (ZOFRAN ODT) 4 MG ODT tab Take 1 tablet (4 mg) by mouth every 6 hours as needed for nausea (Patient not taking: Reported on 10/9/2024) 20 tablet 0               OBJECTIVE:     Exam deferred - video visit          IMAGING:  MRI of the right hip dated 10/7/24 was available for my independent interpretation in our PACS system and demonstrates  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 Chondral fissure with delamination in central femoral head (series 3 image 18)    Multiple views of the right hip dated 10/5/24 were available for my independent interpretation in our PACS system and demonstrate previous femoroplasty with no evidence of recurrent cam lesion. No fracture, AVN, heterotopic bone formation. Alpha 51.1 degrees. ACEA 38.5 degrees. LCEA 34.1           ASSESSMENT & PLAN:    24F  with recurrent right  hip pain s/p arthroscopy in May 2023, MRI evidence of likely labral re-tear and femoral head chondral delamination    Recommend short course of NSAIDs due to pain from both hip and AC separation - toradol prescribed, discussed risks including bleeding risks. Do not take longer than 5 days.      Discussed the catching is likely related to chondral delamination. Reviewed long term implications of this with some incraesed risk for earlier arthritis. If pain remains functionally limiting, can consider hip injection (CSI vs PRP). If catching persists, may consider revision hip arthroscopy with chondroplasty, labral repair vs allograft reconstruction. Would need pre-op CT to evaluate for any residual bony deformity.      Follow up in ATR clinic 6-8 weeks    Options for treatment and/or follow-up care were reviewed with the patient and , Kenya Meredith ATC. Patient was actively involved in the decision making process. Patient verbalized understanding and was in agreement with the plan.    Kandy Villalobos MD  Orthopedic Surgery

## 2024-10-11 NOTE — PROGRESS NOTES
[facility] ATHLETICS  Eliel ATC initial assessment note  Date of service performed: 10/4/2024    Concern: Acute injury  Body part: Shoulder  Description: Right  Injury: Sprain  Type: Athletics related  Date of injury: 10/4/2024    S: Alta was playing her first shift in [employer] game vs. [employer] when she went into the boards and came off on her own c/o right shoulder pain. She reports that she does not think her shoulder came out, but she requested to be seen by team physician who was covering the game. She was seen in ATR by both Dr. nA and Dr. Palomo. She requested her parents come down to ATR during evaluation. They were present for end of evaluation. Full note by Dr. An is in EPIC.    O: See Dr. An note.    A: Right AC joint sprain.    P: Patient did not go back into the game. She declined sling, saying it felt worse than having her arm hang free. Ice immediately given. Plan to get x-rays in the morning, which will be reviewed by Dr. An.     Kenya Meredith, ATC

## 2024-10-14 ENCOUNTER — OFFICE VISIT (OUTPATIENT)
Dept: ORTHOPEDICS | Facility: CLINIC | Age: 24
End: 2024-10-14
Payer: COMMERCIAL

## 2024-10-14 DIAGNOSIS — S49.91XA INJURY OF RIGHT ACROMIOCLAVICULAR JOINT, INITIAL ENCOUNTER: Primary | ICD-10-CM

## 2024-10-15 NOTE — PROGRESS NOTES
Manda Lincoln very pleasant 24-year-old female presents to the athletic training room today for evaluation of her right shoulder.  She sustained an AC injury to the right shoulder approximately 10 days ago.  She has been able to play since then though it was painful to her.  She also felt it moving aroundAnd in light of this information she presented to the athletic training room today for evaluation of her right shoulder.    Examination today shows full forward flexion full abduction full internal/external rotation.  No load and shift instability testing.  Tenderness is noted at the AC joint.  She has pain with cross body abduction.    Images obtained previously do show an injury to her right AC joint.  With trace elevation    Clinical assessment: Right AC sprain        Plan: Long discussion with the athlete at this time I told her that she would be a candidate for a corticosteroid injection to her right AC joint as a way to calm this down.  I discussed with her the pros cons risk and benefits.  She like to think things over.    We also discussed the potential of lidocaine Marcaine injection on game day.  The patient will think this over as well.  I have communicated to my partners covering the game this week and that she may want to proceed with this.  She can follow-up through the training room with me on an as-needed basis.

## 2024-10-16 NOTE — PROGRESS NOTES
[facility] ATHLETICS  Eliel ATC follow-up note  Concern/injury: Right AC joint sprain    Date of service performed: 10/15/2024    Patient practiced today with the team in non-contact [place]. She had her AC joint taped with kinesiotape over joint.     Date of service performed: 10/16/2024  Patient practiced today with the team in non-contact [place]. She had her AC joint taped with kinesiotape over joint.     She did say she does not want to pursue any injection at this time. I did offer to have one of our physicians prescribe an anti-inflammatory medication if she would like. She said she does not want this at this time, as she had natural anti-inflammatory supplements at home, though she said she has not been consistent about taking them.    Kenya Meredith, ATC

## 2024-10-16 NOTE — PROGRESS NOTES
[facility] ATHLETICS  Eliel ATC follow-up note  Date of service performed: 10/7/2024    Concern/injury: Right AC joint sprain    Alta comes in today in better spirits. She is inquiring about her chances to be cleared to play in games at [facility] this next weekend. I explained to her clearance is based on regaining full ROM and strength as well as her feeling like she can play.     ROM: Full PROM of shoulder bilaterally.    Strength: Normal and equal strength compared bilaterally. Does have some discomfort with cross body adduction.    Assessment/plan: Based on her having full strength and ROM, I will allow her to practice tomorrow non-contact, in an effort to decrease likelihood of getting hit and aggravating the joint. She will continue rehab for this injury.    Rehab today: Shoulder retractions 2x10, ROM wall walks 2x5 forward elevation and abduction, ball circles on wall x30 each way, 4 way isos into wall 10x5 sec hold.    Kenya Meredith, ATC

## 2024-10-16 NOTE — PROGRESS NOTES
[facility] ATHLETICS  [facility] ATC follow-up note  Concern/injury: Right AC joint sprain    Date of service performed: 10/10/2024  AC joint taped with kinesiotape and leukotape over AC joint.     Date of service performed: 10/11/2024  Patient began taking toradol - 1 pill at breakfast. She did have a slight upset stomach and chose not to take an additional one with pregame meal.     AC joint taped with kinesiotape and leukotape over AC joint. Additional strip of kinesiotape placed over trap and another over the bicep.     Patient played full game.     Date of service performed: 10/12/2024  Patient took toradol 1 pill with pregame meal. She did not have any upset stomach with the medication today.    AC joint taped with kinesiotape and leukotape over AC joint. Soft tissue work done over the trap and levator muscles on the right side.     She played full game. Plan to have her follow up with Dr. Meadows Monday afternoon in ATR    Date of service performed: 10/13/2024  Patient reports she is in a lot of pain in her AC joint today after playing both full games the past two days. Today is an off day and she is resting. Potentially will have her take another rest day tomorrow depending on her symptoms.        Kenya Meredith, ATC

## 2024-10-16 NOTE — PROGRESS NOTES
[facility] ATHLETICS  [facility] ATC follow-up note  Date of service performed: 10/8/2024    Concern/injury: Right AC joint sprain    Patient comes in today for rehab prior to practice. The following exercises were performed: Standing Is 2x10, Standing Ys 2x10, Is to Ys 2x8, Cross body adduction 2x10, Theraband ER at 0 deg 2x15, ball circles x30 each way    Assessment/plan: Patient taped AC joint with kinesiotape and leukotape over joint. Did full non-contact practice with the team.     Patient did also ask if there was any stronger medication she could take for pain for games. I sent this request to Dr. An, who agreed she could use toradol for games if educated on proper use, which will be done by Dr. Villalobos tomorrow.    Kenya Meredith, ATC

## 2024-10-16 NOTE — PROGRESS NOTES
[facility] ATHLETICS  [facility] ATC follow-up note  Date of service performed: 10/9/2024    Concern/injury: Right AC joint sprain    Patient comes in today for rehab prior to non-contact practice with the team. he following exercises were performed: Standing Is 2x10, Standing Ys 2x10, Is to Ys 2x8, Cross body adduction 2x10, Theraband ER at 0 deg 2x15, ball circles x30 each way     AC joint was taped for practice today using kinesiotape and leukotape over the AC joint.    Patient also had an appointment with Dr. Villalobos today for her hips. As part of the appointment, she prescribed patient toradol for game days. Educated patient on usage and risks/benefits. She will pick medication up today before team leaves for [facility] tomorrow.    Kenya Meredith, ATC     Admission

## 2024-10-16 NOTE — PROGRESS NOTES
[facility] ATHLETICS  [facility] ATC follow-up note  Date of service performed: 10/6/2024    Concern/injury: Right AC joint    Alta did ROM as tolerated today. She did not play in game today.     Assessment/plan: Will re-evaluate tomorrow for plan for the week.    Kenya Meredith, ATC

## 2024-10-16 NOTE — PROGRESS NOTES
[facility] ATHLETICS  [facility] ATC follow-up note  Date of service performed: 10/5/2024    Concern/injury: Right AC joint sprain    Alta had x-ray done of AC joint this morning. This was reviewed by Dr. An who said it was a type 2 AC injury. She may do ROM as tolerated.    Alta came into ATR following x-ray. She is tender specifically over the AC joint. She does have some swelling over the AC joint as well. She is unable to lift arm into forward elevation due to pain. She was able to do ROM table slides as tolerated. DId not do any adduction or abduction due to pain.     Assessment/plan: Applied lidocaine patch over AC joint. She feels better out of sling rather than in it, so she will not wear a sling. She is not practicing today. She is not playing in game tomorrow. She will continue to do some light ROM in painfree range today and tomorrow. She may increase ROM as tolerated. She may use naproxen for pain.     Kenya Meredith, ATC

## 2024-10-16 NOTE — PROGRESS NOTES
[facility] ATHLETICS  [facility] ATC follow-up note  Date of service performed: 10/14/2024    Concern/injury: Right AC joint sprain    Alta comes into ATR today c/o continued pain in her AC joint and hips. She says her shoulder hurts worse from playing in the games this weekend. She does have full ROM and strength but more pain than she had been having. We decided to give her the day off of practice and focus on rehab and recovery instead.     Rehab exercises: Wall walks x5 forward elevation & abduction, shoulder shrugs 3x10, shoulder retractions 3x10, motion with stick - forward elevation and abduction 3x12, ball circles on wall x30 each way, dumbbell punches 2# 2x12.    Assessment/plan: Right AC joint sprain.  Alta saw Dr. Meadows today for follow up since Dr. An is out of town. See his full note in EPIC. He did offer both a cortisone injection and/or lidocaine and marcaine injection. She will think about this and let me know if she would like to proceed with either one. She may continue taking her oral toradol on game days only. She will continue rehab. She may continue playing hockey as tolerated.    Kenya Meredith, ATC

## 2024-10-17 DIAGNOSIS — H43.393 VITREOUS SYNERESIS OF BOTH EYES: Primary | ICD-10-CM

## 2024-10-18 ENCOUNTER — OFFICE VISIT (OUTPATIENT)
Dept: ORTHOPEDICS | Facility: CLINIC | Age: 24
End: 2024-10-18
Payer: COMMERCIAL

## 2024-10-18 DIAGNOSIS — S89.92XA INJURY OF LEFT KNEE, INITIAL ENCOUNTER: Primary | ICD-10-CM

## 2024-10-19 ENCOUNTER — DOCUMENTATION ONLY (OUTPATIENT)
Dept: FAMILY MEDICINE | Facility: CLINIC | Age: 24
End: 2024-10-19

## 2024-10-19 DIAGNOSIS — M25.562 ACUTE PAIN OF LEFT KNEE: Primary | ICD-10-CM

## 2024-10-19 NOTE — PROGRESS NOTES
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  She sustained an injury to the left knee during the third period of her hockey game.  She noted valgus type mechanism.  She states she felt a pop on the inside of her knee.  She did not complete her game.  I evaluated her in the training room.  She did note pain over the MCL.  No significant effusion.  No other obvious injury.  She denies any injury to the ankle or hip.  She denies any prior injury to her knee.    Objective:   PE:  LLE: No open wounds or lacerations noted.  No significant joint effusion.  No pain to palpation over the LCL or lateral joint line.  No pain to palpation over the patella, patellar tendon, or quad tendon.  Pain to palpation over the femoral origin of the MCL.  Mild pain to palpation over the joint line.  No pain to palpation over the tibial insertion of the MCL.  Passive range of motion is 0 to 150 degrees any flexion.  Active range of motion is equivalent of passive range of motion.  Stable to varus stress at 0 and 30 degrees any flexion.  Grade 1 opening to valgus stress at 30 degrees knee flexion with guarding.  Stable anterior posterior drawer.  Negative Lachman.    A/P:  Patient is a 24-year-old female seen here today after a collision hockey game with an injury to her left knee.  By mechanism and exam, it appears she has a low-grade MCL sprain.  There is some mild medial joint line tenderness suggest potential meniscal pathology.  Cruciate exam is stable.  I discussed this with her today.  Will provide her a hinged range of motion brace.  She can be weight-bear as tolerated.  Crutches for comfort.  Will plan on oral NSAIDs and icing tonight.  She will be reevaluated in the morning by the athletic training staff prior to tomorrow afternoon to talk again.  If she feels she is symptomatically able to play with taping or brace, we will allow  her to play.  If she is unable to play secondary to her symptoms, we will obtain an MRI to further evaluate her left knee.    Josue Acosta MD    [facility]   Department of Orthopedic Surgery      Disclaimer: This note consists of symbols derived from keyboarding, dictation and/or voice recognition software. As a result, there may be errors in the script that have gone undetected. Please consider this when interpreting information found in this chart.

## 2024-10-20 ENCOUNTER — ANCILLARY PROCEDURE (OUTPATIENT)
Dept: MRI IMAGING | Facility: CLINIC | Age: 24
End: 2024-10-20
Attending: STUDENT IN AN ORGANIZED HEALTH CARE EDUCATION/TRAINING PROGRAM
Payer: COMMERCIAL

## 2024-10-20 DIAGNOSIS — M25.562 ACUTE PAIN OF LEFT KNEE: ICD-10-CM

## 2024-10-20 PROCEDURE — 73721 MRI JNT OF LWR EXTRE W/O DYE: CPT | Mod: LT | Performed by: RADIOLOGY

## 2024-10-21 ENCOUNTER — OFFICE VISIT (OUTPATIENT)
Dept: ORTHOPEDICS | Facility: CLINIC | Age: 24
End: 2024-10-21
Payer: COMMERCIAL

## 2024-10-21 DIAGNOSIS — M25.562 ACUTE PAIN OF LEFT KNEE: Primary | ICD-10-CM

## 2024-10-21 NOTE — PROGRESS NOTES
[facility] ATHLETICS  [facility] ATC follow-up note  Concern/injury: Right AC joint sprain    Date of service performed: 10/17/2024  Patient practiced today in non-contact jersey with the team. She had her AC joint taped with kinesiotape and leukotape over joint for support.    Date of service performed: 10/18/2024  Patient played in [employer] today vs. [place] with AC joint taped with kinesiotape and leukotape over the joint. She opted to not have AC joint injected prior to the game. She did take one toradol with pregame meal. She sustained a left knee injury in the third period and was unable to complete the game.    Date of service performed: 10/19/2024  Patient did not play in game today due to left knee injury.    Kenya Meredith, ATC

## 2024-10-21 NOTE — PROGRESS NOTES
[facility] ATHLETICS  [facility] ATC follow-up note  Date of service performed: 10/21/2024    Concern/injury: Right AC joint sprain    Patient comes in today for rehab for her right AC joint and left MCL sprain. She is having zero pain in her AC joint. She has full ROM without pain. Her strength is WNL.    Assessment/plan: Right AC joint sprain. She will continue to do rehab for her shoulder. Today's rehab: Is Ys Ts 2# 2x10 each, forward elevation to abduction 2# 2x10 each direction, stick motion, shoulder retraction 2# 2x12, scap punches forward and across body 2# 2x15 each.    She is currently out of practice due to left knee injury. She may resume upper body lifts in the weight room. She should modify any exercise that causes pain at her AC joint.     Kenya Meredith, ATC

## 2024-10-21 NOTE — PROGRESS NOTES
[facility] ATHLETICS  [facility] ATC follow-up note  Date of service performed: 10/21/2024    Concern/injury: Left knee - MCL sprain    Alta comes in today for rehab. She is not practicing with the team or lifting today due to the injury. She is having issues getting into full extension. She said that walking up and down stairs is difficult. She has declined crutches.     Rehab:  - MHP 10 mins  - Russian stim 15 mins 10 on/30 off - with SAQs  - Bike 10 mins  - Heel slides on table 3x8  - AAROM knee extension off table 3x10  - Standing marches into knee extensions  - LAQ 2x10  - TKEs 3x10  - Step up 3x10  - Step down 3x10    At end of rehab, she was able to get to almost 0 deg of extension. She was doing much better with walking and steps. She will see Dr. Meadows this evening for follow up.    Kenya Meredith, ATC

## 2024-10-21 NOTE — PROGRESS NOTES
[facility] ATHLETICS  [facility] ATC follow-up note  Date of service performed: 10/20/2024    Concern/injury: Left knee - MCL sprain    Alta had MRI today for her left knee. Results:  1. Moderate grade sprain of the left knee superficial fibers of the  tibial collateral ligament. There is moderate to high-grade partial  thickness tearing in the proximal fibers with marked surrounding soft  tissue edema.     2. Edema in the medial patellofemoral ligament at its attachment to  the femur also likely indicating a sprain.     3. Interval healing of the previously partially torn posterior  cruciate ligament.     3. The anterior cruciate ligament, lateral supporting structures, and  bilateral menisci are intact. No high-grade cartilage loss.     Communicated with patient via text. She will see Dr. Meadows tomorrow for follow up and to go over MRI results. She will not practice or lift tomorrow.       Kenya Meredith, ATC

## 2024-10-21 NOTE — PROGRESS NOTES
[facility] ATHLETICS  [facility] ATC initial assessment note  Date of service performed: 10/19/2024    Concern: Acute injury  Body part: Knee  Description: Left  Injury: Sprain  Type: Athletics related  Date of injury: 10/18/2024    S: Alta was playing in game at home vs. [place] when in the third period on 10/18/2024, her knee suffered a valgus force and an opposing player fell on top of her knee that was at an awkward angle. She was able to skate off the ice on her own and go to the [facility] for evaluation by Dr. Acosta. See his full note in EPIC. She did not finish the game. Offered crutches - which she declined use. Ice over night and re-evaluate in the morning to determine if she can play the second game vs. [place].    She comes in today for re-evaluation. She reports she did have pain last night and difficulty sleeping through the night because she was unable to find a comfortable position.     O: She is tender over her MCL - at both proximal and distal portions. She does have tenderness over her medial joint line. She is unable to fully bend her knee due to pain. She does not have full extension. (+) Valgus Stress, (-) Varus Stress, (-) Lachmans, (-) Anterior Drawer, unable to perform meniscus tests due to pain.     A: Left knee, MCL sprain, possible medial meniscus injury.    P: She did get her knee taped over her MCL with kinesiotape and leukotape and try to skate during pregame skate but was unsuccessful and it was determined she is unable to play in game.     Discussed with Dr. Acosta and scheduled for MRI tomorrow. She will see Dr. Meadows for a follow up on Monday to go over MRI results.    Kenya Meredith, ATC

## 2024-10-22 NOTE — PROGRESS NOTES
Manda Lincoln is a very pleasant 24-year-old female she is a college  here at [employer].  I saw her last week for a AC injury on the right side.  She was ultimately able to play did not need an injection.  Sustained an injury to her left medial collateral ligament on Friday night.  She was on open plan Saturday.  MRI was obtained.  I was asked to follow-up with her to the athletic training room.    Examination today does show tenderness over the femoral origin of the medial collateral ligament.  Lachman 0.  No pivot shift.  Posterior drawer normal.  No opening to varus stress testing.  Patient has good stability to valgus stress testing though it does seem to be create pain over the femoral origin.  No instability to the patella on lateral translation.    MRI obtained independently reviewed by myself does show a femoral sided injury to the medial collateral ligament.  ACL intact.  MPFL intact.  Lateral ligaments intact.  Articular cartilage menisci intact.    Clinical assessment: Femoral sided MCL injury    Plan: Long discussion with the athlete.  Reviewed the diagnosis.  This is a grade 1 possibly grade 2 MCL injury.  Initiate good rehabilitation program, bracing.  Okay for straight line activities when able and can return to sports when has functionally completed return to sports pathway as followed by the athletic medicine team.  Follow-up in my training room as necessary.

## 2024-10-25 NOTE — PROGRESS NOTES
[facility] ATHLETICS  [facility] rehab calendar    Manda Lincoln  Injury requiring rehab: Left knee MCL sprain    Below is the rehab calendar for the week of 10/21-10/25/2024. Alta is unable to participate in practices or games due to left knee MCL sprain. She did not go into weights this week until Friday (for non-dressers bike workout) due to knee injury, bilateral hip injuries, and right shoulder AC joint sprain (improving). She does report improvement with symptoms when wearing the DonJoy playmaker hinged knee brace. She is still having difficulty getting full knee extension, as she hyperextends on her uninvolved side and gets to about 0 deg on the involved side. She has regained control of her quad since the beginning of the week. We have been working on going up and down stairs during rehab and she reports this is getting easier. We will continue to work on rehab. Once she regains full ROM and strength we will progress her back on the ice.    MONDAY TUESDAY WEDNESDAY THURSDAY FRIDAY   10/21 10/22 10/23 10/24 10/25     [facility] 10'  Bike   circles progressing to full circles 10'  Russian stim - 10 sec on/30 sec off x15 mins with SAQs  Table heel slides  AAROM - knee flexion/extension off the table  Standing marches into knee extension  Long Arc Quads 2x10  TKEs 2x15 with yellow band  Step up onto box 4  3x10  Step down off of box 4  3x10    Placed in Donjoy Playmaker hinged knee brace. Appointment with Dr. Meadows   [facility] 10'  Bike 5'  Russian Stim - 10 sec on/30 sec off x15 mins  Table heel slides  Knee extensions off table 2x12  TKEs 2x15 with yellow band  Step up 6  box 2x10  Step Down 2  box 2x12  Standing marches 2x10 each side  SLB on pad 2x30 seconds  PROM knee extension prone on table          [facility] 10'  Bike 5'  Russian stim with knee extensions during hold - 10 sec on/50 sec off x10 mins  Table heel slides  TKEs with yellow band 2x15  Clamshells 2x15 each side  Standing marches to knee  extensions 2x10 each side  SLB with ball toss 3x30 seconds    Reports feeling more sore from rehab yesterday.      [facility] 10'  Bike 5'  Russian Stim - 10 sec on/30 sec off x15 mins  Table heel slides  Knee extensions off table 2x12  Prone hamstring curl 3# 2x12  TKEs 2x15 with yellow band  Step up 6  box 2x10  Standing marches 2x10 each side  SLB on pad 2x30 seconds  PROM knee extension prone on table     Difficult to get into 0 deg of extension.       [facility] 10'  Bike 5'  SLR toes up 2x12  Prone hamstring curl 4# 2x12  Prone glute raise 2x12  TKEs 2x15  Knee extensions off table 2x12    Continues to report pain along medial side of knee. MPFL and proximal MCL are tender to palpate. She does say today her knee feels  the same or slightly better  than yesterday. Feels that her knee  locks  and is unable to get fully straight. Continuing to wear Donjoy hinged knee brace.        Kenya Meredith, ATC

## 2024-11-02 DIAGNOSIS — F41.8 DEPRESSION WITH ANXIETY: ICD-10-CM

## 2024-11-02 RX ORDER — ESCITALOPRAM OXALATE 10 MG/1
10 TABLET ORAL DAILY
Qty: 90 TABLET | Refills: 0 | Status: SHIPPED | OUTPATIENT
Start: 2024-11-02

## 2024-11-02 RX ORDER — ESCITALOPRAM OXALATE 20 MG/1
20 TABLET ORAL DAILY
Qty: 90 TABLET | Refills: 0 | Status: SHIPPED | OUTPATIENT
Start: 2024-11-02

## 2024-11-14 ENCOUNTER — OFFICE VISIT (OUTPATIENT)
Dept: ORTHOPEDICS | Facility: CLINIC | Age: 24
End: 2024-11-14
Payer: COMMERCIAL

## 2024-11-14 DIAGNOSIS — S73.191D TEAR OF RIGHT ACETABULAR LABRUM, SUBSEQUENT ENCOUNTER: Primary | ICD-10-CM

## 2024-11-14 NOTE — PROGRESS NOTES
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 Since last visit she reports continued right anterior hip pain and catching/locking. The pain is worst with deep hip flexion based activities such as squats with the right hip forward. The catching/locking occurs after sitting for a period of time then standing up and feeling like the hip is stuck, with a sharp pain as it unlocks. She does have pain during hockey activities but has been able to continue to play. She does have questions about use of Toradol for game days.     Overall her hip is quite limiting and she is interested in further discussing possible surgery in the post-season.    Patient has also seen Dr. Meadows for left knee MCL sprain and asked that I evaluate the knee due to posterior knee swelling and discomfort    There is no problem list on file for this patient.      Current Outpatient Medications   Medication Sig Dispense Refill    escitalopram (LEXAPRO) 10 MG tablet Take 1 tablet (10 mg) by mouth daily. 90 tablet 0    escitalopram (LEXAPRO) 20 MG tablet Take 1 tablet (20 mg) by mouth daily. 90 tablet 0    ketorolac (TORADOL) 10 MG tablet Take 1 tablet (10 mg) by mouth every 6 hours as needed for moderate pain. 20 tablet 0    ondansetron (ZOFRAN ODT) 4 MG ODT tab Take 1 tablet (4 mg) by mouth every 6 hours as needed for nausea (Patient not taking: Reported on 10/19/2024) 20 tablet 0               OBJECTIVE:     BMI: There is no height or weight on file to calculate BMI.    GENERAL:  The patient is pleasant and cooperative today and appears her stated age.   She is alert and oriented x3 and in no acute distress.    HENT:  Appearance symmetric with no discoloration or masses.  External ears and nose normal without lesion or deformity.    EYES: Pupils equal and round.   Conjunctivae and lids normal.    RESPIRATORY:  Breathing is unlabored without any audible wheezing.  NEURO/PSY: Normal affect. She shows no signs of agitation or depression.   CARDIOVASC: Well perfused extremities  SKIN: Normal temperature, turgor, and texture; no rash.  MUSCULOSKELETAL:    Right Hip  Gait: Normal    Tenderness: None        Hip Range of Motion:   Flexion  Extension IR ER   Affected 120 NT 30 60   Unaffected 120 NT 30 60     Hip Strength (out of 5):   Flexion Extension Abduction Adduction   Affected 5/5 5/5 5/5 5/5   Unaffected 5/5 5/5 5/5 5/5        Provocative Tests:    Flexion/Adduction/Internal Rotation (FADIR) Test for Labral Tear (Impingement Sign) Positive    Stinchfield Test: Positive    Log Roll Test Negative    NIKOS Test Negative          left KNEE  Gait: normal   Quadriceps Atrophy: None    Tenderness: Medial joint line   ROM affected (contralateral): 0-140 (0-140)   Effusion: Trace; + posterior fullness        Meniscus Exam:  Medial Meniscus: positive Medial joint line tenderness  negative  Jennifer test    Lateral Meniscus: negative Lateral joint line tenderness  negative  Jennifer test      Ligament Exam:   Lachman MCL PCL LCL   Affected Normal  No laxity, + pain Grade 1A Normal    Unaffected Normal Normal Normal Normal        Distally the patient's neurovascular status is  normal with sensation intact to light touch superficial peroneal, deep peroneal, sural, saphenous, and tibial nerve distributions. Motor grossly intact EHL, Tibialis anterior, gastroc-soleus complex    Limb is  well perfused with palpable pulses and brisk capillary refill <2 seconds     IMAGING:  No new imaging today         ASSESSMENT & PLAN:    23F  with recurrent right hip pain s/p arthroscopy in May 2023, MRI evidence of likely labral re-tear and femoral head chondral delamination. Also with left knee MCL sprain and posterior knee fullness/discomfort consistent with popliteal (Bakers) cyst    -  Recommended that she avoid aggravating lifts/conditioning exercises particularly those involving deep lunge with left leg forward  - Reviewed risks involved with toradol use with patient. Discussed medical safety of intermittent game day toradol use with Dr. Najera who agrees this is ok so long as athlete understands risks.  - Will order CT scan with 3D reconstruction to evaluate for residual bony impingement lesions  - Will follow up 3-4 weeks before end of season to review CT scan and discuss surgical plans   - Provided reassurance of posterior knee fullness consistent with popliteal cyst. No intervention needed.     Options for treatment and/or follow-up care were reviewed with the patient and , Kenya Meredith ATC. Patient was actively involved in the decision making process. Patient verbalized understanding and was in agreement with the plan.    Kandy Villalobos MD  Orthopedic Surgery

## 2024-11-26 ENCOUNTER — OFFICE VISIT (OUTPATIENT)
Dept: FAMILY MEDICINE | Facility: CLINIC | Age: 24
End: 2024-11-26
Payer: COMMERCIAL

## 2024-11-26 VITALS
DIASTOLIC BLOOD PRESSURE: 79 MMHG | HEIGHT: 69 IN | SYSTOLIC BLOOD PRESSURE: 115 MMHG | HEART RATE: 74 BPM | WEIGHT: 183.3 LBS | BODY MASS INDEX: 27.15 KG/M2

## 2024-11-26 DIAGNOSIS — S83.412A SPRAIN OF MEDIAL COLLATERAL LIGAMENT OF LEFT KNEE, INITIAL ENCOUNTER: ICD-10-CM

## 2024-11-26 DIAGNOSIS — L70.9 ACNE, UNSPECIFIED ACNE TYPE: Primary | ICD-10-CM

## 2024-11-26 RX ORDER — TRETINOIN 0.25 MG/G
CREAM TOPICAL AT BEDTIME
Qty: 45 G | Refills: 0 | Status: SHIPPED | OUTPATIENT
Start: 2024-11-26

## 2024-11-26 NOTE — PROGRESS NOTES
S:      -MCL sprain Oct 18 2024 during a game  -Saw Dr. Meadows  -              O:  NAD      Narrative & Impression   EXAMINATION: MRI of the left knee without contrast     DATE:  10/20/2024     HISTORY: Acute knee pain     TECHNIQUE: Multisequence, multiplanar MR imaging of the left knee was  obtained using standard sequences in 3 orthogonal planes without the  use of intravenous or intra-articular gadolinium contrast.     Comparison: Comparison MRI dated 2/26/2021 was reviewed, which  demonstrated posterior cruciate ligament injury.     FINDINGS:     In the medial compartment, the medial meniscus is intact. There is no  high-grade or full-thickness chondral/or subchondral edema.     In the lateral compartment, the lateral meniscus is intact. There is  no high-grade or full-thickness cartilage loss or subchondral edema.     In the patellofemoral compartment, there is no high-grade or  full-thickness cartilage loss or subchondral edema. The IS ratio is  1.4. Very subtle soft tissue edema in the superolateral aspect of  Hoffa's fat.     The anterior and posterior cruciate ligaments are intact.     There is a moderate grade sprain of the tibial collateral ligament  with partial thickness tearing of the proximal superficial fibers with  surrounding soft tissue edema.     The anterior iliotibial band, fibular collateral ligament, biceps  femoris tendon, and popliteus tendons are intact..      There is small joint effusion. No popliteal cyst. No joint bodies.     The extensor mechanism is intact and normal in appearance.                                                                       IMPRESSION:  1. Moderate grade sprain of the left knee superficial fibers of the  tibial collateral ligament. There is moderate to high-grade partial  thickness tearing in the proximal fibers with marked surrounding soft  tissue edema.     2. Edema in the medial patellofemoral ligament at its attachment to  the femur also likely  indicating a sprain.     3. Interval healing of the previously partially torn posterior  cruciate ligament.     3. The anterior cruciate ligament, lateral supporting structures, and  bilateral menisci are intact. No high-grade cartilage loss.      DIANNE DUMONT MD             collateral ligament. There is moderate to high-grade partial  thickness tearing in the proximal fibers with marked surrounding soft  tissue edema.     2. Edema in the medial patellofemoral ligament at its attachment to  the femur also likely indicating a sprain.     3. Interval healing of the previously partially torn posterior  cruciate ligament.     3. The anterior cruciate ligament, lateral supporting structures, and  bilateral menisci are intact. No high-grade cartilage loss.      DIANNE DUMONT MD

## 2024-12-01 ENCOUNTER — HEALTH MAINTENANCE LETTER (OUTPATIENT)
Age: 24
End: 2024-12-01

## 2024-12-02 ENCOUNTER — TRANSFERRED RECORDS (OUTPATIENT)
Dept: HEALTH INFORMATION MANAGEMENT | Facility: CLINIC | Age: 24
End: 2024-12-02
Payer: COMMERCIAL

## 2025-01-06 DIAGNOSIS — Z20.828 EXPOSURE TO INFLUENZA: Primary | ICD-10-CM

## 2025-01-06 RX ORDER — OSELTAMIVIR PHOSPHATE 75 MG/1
75 CAPSULE ORAL DAILY
Qty: 7 CAPSULE | Refills: 0 | Status: SHIPPED | OUTPATIENT
Start: 2025-01-06 | End: 2025-01-13

## 2025-01-16 ENCOUNTER — DOCUMENTATION ONLY (OUTPATIENT)
Dept: FAMILY MEDICINE | Facility: CLINIC | Age: 25
End: 2025-01-16

## 2025-01-16 ENCOUNTER — ANCILLARY PROCEDURE (OUTPATIENT)
Dept: MRI IMAGING | Facility: CLINIC | Age: 25
End: 2025-01-16
Attending: STUDENT IN AN ORGANIZED HEALTH CARE EDUCATION/TRAINING PROGRAM
Payer: COMMERCIAL

## 2025-01-16 DIAGNOSIS — M89.8X6 PAIN OF LEFT FIBULA: ICD-10-CM

## 2025-01-16 PROCEDURE — 73718 MRI LOWER EXTREMITY W/O DYE: CPT | Mod: LT | Performed by: RADIOLOGY

## 2025-01-16 NOTE — PROGRESS NOTES
[facility] ATHLETICS  Eliel ATC initial assessment note  Date of service performed: 1/16/2025    Concern: Acute injury  Body part: Lower leg  Description: Left  Injury: Contusion  Type: Athletics related  Date of injury: 1/14/2025    S: Alta was practicing PK during practice today when she blocked a shot off her lateral left leg in mid fibula where there is minimal padding. She came off the ice for evaluation. She then reported to me that she had blocked another shot off her distal left fibula on Tuesday where she has no padding or protection. She says this area hurts more than the area she hit today. She blocked mutliple shots off of her left tibia last season that resulted in a tibia stress fracture. This has since resolved on imaging, though she notes that she still will feel discomfort in this area. She had a left MCL sprain in November, which is mostly resolved.     O: Tenderness to palpate along fibula. This is worse distally. She does have a bump on the distal fibula where she blocked the original shot on Tuesday. There is bruising around this area and into her posterior lower leg. There was some redness around the newer blocked shot in the medial fibula. She is able to weight bear but does report she has some distal fibula pain. She has full ankle ROM. She does have pain with bump test. She has a negative squeeze test.     A: Fibula pain.    P: Discussed with Alta options including getting imaging on the area. She has agreed to this, and after discussing with Dr. Palomo we have decided to proceed with MRI. Alta will see Dr. An at our game tomorrow for full evaluation after MRI is completed today.  We also discussed putting Alta in a walking boot due to pain with WB, but she is refusing this at this time. She will ice area and try to stay off of leg as much as possible. MRI is scheduled for 1:45 pm today. Will follow up with team physician tomorrow.    Kenya Meredith, ATC

## 2025-01-17 ENCOUNTER — OFFICE VISIT (OUTPATIENT)
Dept: ORTHOPEDICS | Facility: CLINIC | Age: 25
End: 2025-01-17
Payer: COMMERCIAL

## 2025-01-17 DIAGNOSIS — S80.12XA CONTUSION OF LEFT LEG, INITIAL ENCOUNTER: Primary | ICD-10-CM

## 2025-02-01 ENCOUNTER — ANCILLARY PROCEDURE (OUTPATIENT)
Dept: GENERAL RADIOLOGY | Facility: CLINIC | Age: 25
End: 2025-02-01
Attending: PEDIATRICS
Payer: COMMERCIAL

## 2025-02-01 DIAGNOSIS — S93.409A ANKLE SPRAIN: ICD-10-CM

## 2025-02-01 DIAGNOSIS — S93.409A ANKLE SPRAIN: Primary | ICD-10-CM

## 2025-02-01 PROCEDURE — 73610 X-RAY EXAM OF ANKLE: CPT | Mod: LT | Performed by: RADIOLOGY

## 2025-02-12 ENCOUNTER — OFFICE VISIT (OUTPATIENT)
Dept: ORTHOPEDICS | Facility: CLINIC | Age: 25
End: 2025-02-12
Payer: COMMERCIAL

## 2025-02-12 VITALS
BODY MASS INDEX: 27.49 KG/M2 | HEIGHT: 69 IN | WEIGHT: 185.6 LBS | SYSTOLIC BLOOD PRESSURE: 117 MMHG | HEART RATE: 67 BPM | DIASTOLIC BLOOD PRESSURE: 71 MMHG

## 2025-02-12 DIAGNOSIS — G47.9 SLEEP DISORDER: Primary | ICD-10-CM

## 2025-02-12 RX ORDER — GABAPENTIN 300 MG/1
CAPSULE ORAL
Qty: 30 CAPSULE | Refills: 1 | Status: SHIPPED | OUTPATIENT
Start: 2025-02-12

## 2025-02-12 NOTE — PROGRESS NOTES
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 Developed swelling, bruising and pain laterally. Able to WB but painful. Had recently injured this ankle when struck by puck and MRI revealed fibula bone edema at that time. Has sprained this ankle in the past and says it continues to feel unstable at times. Felt better in CAM boot initially which she wore for 2 days. Now comfortable in a regular shoe. New xrays of left ankle obtained after recent injury which were normal. Treated as a sprain. Feeling better especially lateral ankle pain. Still having some pain along distal fibula and now more pain medially. Still swells with activity.     She has also had right thumb pain after being struck by a puck 3 days ago. Pain over distal phalanx and there is bruising under nailbed. Feeling better    Side sleep - unable to bc of hip pain, catches when rolling.          Medications:     Current Outpatient Medications   Medication Sig Dispense Refill    escitalopram (LEXAPRO) 10 MG tablet Take 1 tablet (10 mg) by mouth daily. 90 tablet 0    escitalopram (LEXAPRO) 20 MG tablet Take 1 tablet (20 mg) by mouth daily. 90 tablet 0    ketorolac (TORADOL) 10 MG tablet Take 1 tablet (10 mg) by mouth every 6 hours as needed for moderate pain. 20 tablet 0    ondansetron (ZOFRAN ODT) 4 MG ODT tab Take 1 tablet (4 mg) by mouth every 6 hours as needed for nausea (Patient not taking: Reported on 2/1/2025) 20 tablet 0    tretinoin (RETIN-A) 0.025 % external cream Apply topically at bedtime. 45 g 0     No current facility-administered medications for this visit.             Allergies:      Allergies   Allergen Reactions    Amoxicillin     Penicillin G     Voltaren [Diclofenac] Rash             Review of Systems:   A comprehensive 10 point review of systems (constitutional, ENT, cardiac, peripheral vascular, respiratory, GI, , Musculoskeletal, skin, Neurological) was performed and found to be negative except as described in this note.           Physical Exam:   COMPLETE EXAMINATION:   VITAL SIGNS: There were no vitals taken for this visit.  GEN: Alert, well-nourished, and in no distress.   HEENT:   Head: Normocephalic and atraumatic.   Eyes: PERRLA, EOMI  NEURO: Alert and oriented to person, place, and time. Gait normal.  SKIN: No rash, warmth or erythema  PSYCH: Mood, memory, affect and judgment normal.   MSK: right thumb: blush of subungual bruising, TTP over distal phalanx, so significant swelling; FROM   Left ankle: TTP over distal fibula, no TTP over lateral ligaments; mild TTP over distal          Imaging:     Narrative & Impression   MR left tibia/fibula without contrast 1/16/2025 2:00 PM     Techniques: Multiplanar multisequence imaging of the left tibia/fibula  was obtained without  administration of intra-articular or intravenous  contrast using routine protocol.     History: Pain of left fibula     Comparison: Tibia-fibula MRI 4/1/2024     Findings:     A marker is placed at the lateral aspect of distal fibula  approximately  cm from the tibiotalar joint line.     Osseous structures     Stress fracture: Underlying the external marker, prominent focal  subcutaneous edema overlying the investing fascia along the peritoneal  compartment muscle, and also extending over the lateral aspect of the  distal fibula shaft without associated osseous signal abnormality.     Separately, more prominent periosteal edema and mild muscle edema  along the mid shaft of the fibula. No evidence of intramedullary or  intracortical signal abnormality. There is also associated regional  subcutaneous edema including along the investing layer of the fascia.     Muscles  Muscles: Mild edema in the peroneus  brevis. The visualized muscles are  unremarkable without edema or atrophy.     Joint/Periarticular soft tissue     Internal derangement of joint assessment are limited owing to chosen  field of view.     Other Findings:  Evaluation of contralateral right leg confined to large field-of-view  coronal sequences show no fracture, or contusion, infiltrative change.  Muscle bulk is preserved..                                                                      Impression:  1. Fredericson grade1 medial stress syndrome equivalent of the left  fibula at mid shaft and distal shaft with distal shaft corresponding  to the external marker.  *  Corresponding subcutaneous edema, maybe due to contusion.  2. Low-grade strain of the peroneus brevis.     Narrative & Impression   3 views left ankle radiographs 2/3/2025 6:46 AM     History: Ankle sprain     Comparison: MR left tibia/fibula 1/16/2025     Findings:     Standing AP, oblique, and lateral  views of the left ankle were  obtained.      No acute osseous abnormality.       Small ossicle adjacent to the medial malleolar tip, likely sequelae of  remote trauma.     Ankle mortise and syndesmosis are congruent on this non-weight bearing  images.     Subcutaneous soft tissue stranding along the lateral aspect of the  distal leg, corresponding to recent MRI abnormality.                                                                      Impression:  1. No acute osseous abnormality.  2. No substantial degenerative change.  3. Subcutaneous soft tissue stranding along the lateral aspect of the  distal leg, corresponding to recent MRI abnormality.             Assessment:     Right thumb contusion and subungual hematoma  Left ankle sprain and prior fibula contusion, h/o recurrent ankle sprains  Right hip BABATUNDE/labral tear - pain disrupting sleep, planning on surgery after season        Plan:     Discussed diagnosis and treatment recommendations.  Thumb tip protector while playing. Consider  XR if pain worsens.  Continue ankle rehab with ATC. If pain worsening, repeat MRI. If ankle instability sx persist, consult with Dr. Chavez.  Trial gabapentin 300 mg at bedtime. Reviewed R/B/SE.    Karina Najera M.D.    ATC Kenya Meredith was present for the entire visit.

## 2025-02-18 NOTE — PROGRESS NOTES
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   Per her ATC, Kenya Meredith's note yesterday: \"Alta was practicing PK during practice today when she blocked a shot off her lateral left leg in mid fibula where there is minimal padding. She came off the ice for evaluation. She then reported to me that she had blocked another shot off her distal left fibula on Tuesday where she has no padding or protection. She says this area hurts more than the area she hit today. She blocked mutliple shots off of her left tibia last season that resulted in a tibia stress fracture. This has since resolved on imaging, though she notes that she still will feel discomfort in this area.\"  She had an MRI yesterday and those available for review.    PHYSICAL EXAM:  Young adult female in no acute distress  Respirations even and unlabored.  Focused left lower extremity exam: Skin intact over left leg. Slight ecchymosis over lateral distal leg. Able to ambulate without antalgic gait, no difficulty weight bearing. Slight discomfort over distal fibula. No instability at ankle. Full inversion/eversion and ankle flex/extension.     IMAGING:   Left ankle/leg MRI completed yesterday were reviewed with the Radiologist's Impression below:  Impression:  1. Fredericson grade1 medial stress syndrome equivalent of the left  fibula at mid shaft and distal shaft with distal shaft corresponding  to the external marker.  *  Corresponding subcutaneous edema, maybe due to contusion.  2. Low-grade strain of the peroneus brevis.    ASSESSMENT:  Left leg contusion (injury when struck by puck)    PLAN:  We discussed that as Alta is able to ambulate and weightbear without difficulty or need for protective ambulation, she may continue to do so. Her MICHEAL represents more one of impact/contusion " as opposed to repetitive strain or stress fracture. Encourage ROM, ice, compression. Will do ankle ROM/rehab in training room. Alta will report any increase in pain or new symptoms.    Diana An MD

## 2025-02-20 DIAGNOSIS — M25.511 ARTHRALGIA OF RIGHT ACROMIOCLAVICULAR JOINT: Primary | ICD-10-CM

## 2025-02-21 ENCOUNTER — OFFICE VISIT (OUTPATIENT)
Dept: ORTHOPEDICS | Facility: CLINIC | Age: 25
End: 2025-02-21
Payer: COMMERCIAL

## 2025-02-21 DIAGNOSIS — S49.91XA INJURY OF RIGHT ACROMIOCLAVICULAR JOINT, INITIAL ENCOUNTER: Primary | ICD-10-CM

## 2025-02-25 ENCOUNTER — ANCILLARY PROCEDURE (OUTPATIENT)
Dept: MRI IMAGING | Facility: CLINIC | Age: 25
End: 2025-02-25
Attending: ORTHOPAEDIC SURGERY
Payer: COMMERCIAL

## 2025-02-25 DIAGNOSIS — M25.511 ARTHRALGIA OF RIGHT ACROMIOCLAVICULAR JOINT: ICD-10-CM

## 2025-02-25 PROCEDURE — 73221 MRI JOINT UPR EXTREM W/O DYE: CPT | Mod: RT | Performed by: RADIOLOGY

## 2025-02-27 ENCOUNTER — OFFICE VISIT (OUTPATIENT)
Dept: ORTHOPEDICS | Facility: CLINIC | Age: 25
End: 2025-02-27
Payer: COMMERCIAL

## 2025-02-27 DIAGNOSIS — S73.191D TEAR OF RIGHT ACETABULAR LABRUM, SUBSEQUENT ENCOUNTER: Primary | ICD-10-CM

## 2025-02-27 DIAGNOSIS — M24.151 ARTICULAR CARTILAGE DISORDER OF RIGHT HIP: ICD-10-CM

## 2025-02-28 NOTE — PROGRESS NOTES
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 She endorses continued right hip pain and catching/locking. The pain is located in the anterior groin and remains most painful with hip flexion. Mechanical catching/locking occurs while sleeping at night or after sitting for a period of time then standing up and feeling like the hip is stuck, with a sharp pain as it unlocks. She endorses pain even with walking at times. While this will be her last season of hockey, she is interested in surgery after the season concludes due to the continued high levels of pain.      There is no problem list on file for this patient.      Current Outpatient Medications   Medication Sig Dispense Refill    escitalopram (LEXAPRO) 10 MG tablet Take 1 tablet (10 mg) by mouth daily. 90 tablet 0    escitalopram (LEXAPRO) 20 MG tablet Take 1 tablet (20 mg) by mouth daily. 90 tablet 0    gabapentin (NEURONTIN) 300 MG capsule Take 1 capsule at bedtime prn 30 capsule 1    ketorolac (TORADOL) 10 MG tablet Take 1 tablet (10 mg) by mouth every 6 hours as needed for moderate pain. 20 tablet 0    tretinoin (RETIN-A) 0.025 % external cream Apply topically at bedtime. 45 g 0               OBJECTIVE:     BMI: There is no height or weight on file to calculate BMI.    GENERAL:  The patient is pleasant and cooperative today and appears her stated age.   She is alert and oriented x3 and in no acute distress.    HENT:  Appearance symmetric with no discoloration or masses.  External ears and nose normal without lesion or deformity.    EYES: Pupils equal and round.  Conjunctivae and lids normal.    RESPIRATORY:  Breathing is unlabored without any audible wheezing.  NEURO/PSY: Normal affect. She shows no signs of agitation or depression.   CARDIOVASC: Well perfused extremities  SKIN: Normal  temperature, turgor, and texture; no rash.  MUSCULOSKELETAL:    Right Hip  Gait: Normal    Tenderness: None        Hip Range of Motion:   Flexion  Extension IR ER   Affected 120 NT 30 60   Unaffected 120 NT 30 60     Hip Strength (out of 5):   Flexion Extension Abduction Adduction   Affected 5/5 5/5 5/5 5/5   Unaffected 5/5 5/5 5/5 5/5        Provocative Tests:    Flexion/Adduction/Internal Rotation (FADIR) Test for Labral Tear (Impingement Sign) Positive    Stinchfield Test: Positive    Log Roll Test Negative    NIKOS Test Positive          Distally the patient's neurovascular status is  normal with sensation intact to light touch superficial peroneal, deep peroneal, sural, saphenous, and tibial nerve distributions. Motor grossly intact EHL, Tibialis anterior, gastroc-soleus complex    Limb is  well perfused with palpable pulses and brisk capillary refill <2 seconds     IMAGING:  CT scan of the right hip dated 12/2/24 was available for my independent interpretation and demonstrates no residual bony cam or pincer lesion.     Previous imaging:    MRI of the right hip dated 10/7/24 was available for my independent interpretation in our PACS system and demonstrates  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 Chondral fissure with delamination in central femoral head (series 3 image 18)     Multiple views of the right hip dated 10/5/24 were available for my independent interpretation in our PACS system and demonstrate previous femoroplasty with no evidence of recurrent cam lesion. No fracture, AVN, heterotopic bone formation. Alpha 51.1 degrees. ACEA 38.5 degrees. LCEA 34.1         ASSESSMENT & PLAN:    24F  with recurrent right hip pain s/p previous arthroscopy, MRI evidence of labral re-tear and femoral head chondral delamination.      The natural history of the problem was discussed with the patient, as well as different treatment options including  non-surgical and surgical options.     Alta has radiographic evidence of sufficient prior correction of  femoroacetabular impingement bony lesions, but with a recurrent labral tear on MRI as well as chondral delamination injury, and has anterior hip pain and mechanical locking with a positive impingement (FADIR) test. There is no radiographic evidence of osteoarthritis, with a Tonnis grade of 0, and no high grade cartilage loss on MRI.  she has had pain for more than 6 months despite physical therapy, NSAIDs, and activity modification.      I have therefore recommended surgery for right hip revision arthroscopy with possible labral repair versus debridement or allograft reconstruction, chondroplasty, possible microfracture and other indicated procedures.     Hip arthroscopy is an effective treatment for patients with symptomatic BABATUNDE. This surgery addresses both the bony morphology (cam or pincer lesion) that places the labrum at risk with femoral and/or acetabular osteoplasty, and the labral injury with labral repair, debridement, or reconstruction.     Expectations and post-operative recovery/rehabilitation were disscussed as well as post-operative pain management with anti-inflammatory medications and limited opioid use.     Post-Operative recovery time frames after hip arthroscopy:  Below is the typical recovery after hip arthroscopy with labral repair. This timeline can be altered or slowed down due to additional surgical procedures (microfracture, labral reconstruction, tendon repair) or concerns during your recovery.  Partial weight bearing with crutches x 3 weeks - this would increase to 4 weeks for labral reconstruction or 6 weeks if microfracture is required  - 3-6 weeks: Patients begin weaning off of crutches  (PT will assist you with the weaning process)  - 6 weeks: Patients can begin non-impact gym exercises such as elliptical, stationary bike, swimming, yoga, light weight lifting  - 3-4 months:  Patients can begin running (you will work with PT on a running progression program)  - 5-6 months: Patients can return to full unrestricted activities/sports    I reviewed the risks, benefits, and alternatives of surgery and anesthesia and explained it in detail to the patient. The patient understands that the risks include but are not limited to bleeding, infection, damage to adjacent tissues, nerves, arteries, and veins, deep venous thrombosis, and complications from anesthesia. Local problems could include wound dehiscence or infection, neurovascular damage, pain, scar, failure of hardware or operation that could necessitate reoperation. Risks unique to hip arthroscopy include the risk of numbness in the thigh, groin/perineum, leg or foot due to traction, weakness into the foot due to traction, abdominal compartment syndrome, hip instability or dislocation, and femoral neck fracture. The patient is fully informed and I have answered all questions. They agree to proceed.     Options for treatment and/or follow-up care were reviewed with the patient and , Kenya Meredith ATC. Patient was actively involved in the decision making process. Patient verbalized understanding and was in agreement with the plan.    Kandy Villalobos MD  Orthopedic Surgery

## 2025-03-08 NOTE — PROGRESS NOTES
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 She had an AC injury earlier this season. It has been somewhat irritated recently. She is also dealing with pain from her hips and her back.     PHYSICAL EXAM:  Young adult female in no acute distress  Respirations even and unlabored.  Focused upper extremity exam: Skin intact over right shoulder. No significant prominence or asymmetry. She has active right FE to 175, ER at side to 60 and IR to T12. Some discomfort with cross body adduction. Motor intact to EPL,FPL, Intrinsincs. Slight tenderness to palpation at the AC joint but localizes more to the acromion than distal clavicle. No pain over the biceps groove.     IMAGING: pending    ASSESSMENT:  Right AC joint injury    PLAN:  We will obtain an MRI of the shoulder to evaluate given the ongoing symptoms.     Diana An MD

## 2025-03-14 ENCOUNTER — OFFICE VISIT (OUTPATIENT)
Dept: FAMILY MEDICINE | Facility: CLINIC | Age: 25
End: 2025-03-14
Payer: COMMERCIAL

## 2025-03-14 VITALS
SYSTOLIC BLOOD PRESSURE: 126 MMHG | DIASTOLIC BLOOD PRESSURE: 70 MMHG | HEART RATE: 72 BPM | WEIGHT: 185 LBS | HEIGHT: 69 IN | BODY MASS INDEX: 27.4 KG/M2

## 2025-03-14 DIAGNOSIS — Z01.818 PREOP EXAMINATION: Primary | ICD-10-CM

## 2025-03-14 DIAGNOSIS — S73.191D TEAR OF RIGHT ACETABULAR LABRUM, SUBSEQUENT ENCOUNTER: ICD-10-CM

## 2025-03-14 NOTE — PROGRESS NOTES
Preoperative Evaluation  [facility]  [address]  [facility] [address]  Phone: [phone]  Fax: [phone]  Primary Provider: No primary care provider on file.  Pre-op Performing Provider: Sol Palomo MD  Mar 14, 2025               3/14/2025   Surgical Information   What procedure is being done? R Hip Revision Arthroscopy   Facility or Hospital where procedure/surgery will be performed: [facility]   Who is doing the procedure / surgery? Dr. Kandy Villalobos   Date of surgery / procedure: 3/25/25   Where do you plan to recover after surgery? at home with family     Fax number for surgical facility: [phone]    Assessment & Plan     The proposed surgical procedure is considered INTERMEDIATE risk.    Preop examination  Tear of right acetabular labrum, subsequent encounter  Previously tolerated orthopedic surgeries without complication, no additional concerns.        - No identified additional risk factors other than previously addressed    Antiplatelet or Anticoagulation Medication Instructions   - We reviewed the medication list and the patient is not on an antiplatelet or anticoagulation medications.    Additional Medication Instructions  We reviewed the medication list and there are no chronic medications that need to be adjusted for this procedure.   - naproxen (Aleve, Naprosyn): DO NOT TAKE 4 days before surgery.     Recommendation  Approval given to proceed with proposed procedure, without further diagnostic evaluation.    Matt Holm is a 24 year old, presenting for the following:  Pre-Op Exam            HPI: Chronic right hip pain. Previously had BABATUNDE and labral repair but had recurrence of symptoms found to have labral retear and femoral head chondral delamination. No problems with previous surgeries.          3/14/2025   Pre-Op Questionnaire   Have you ever had a heart attack or stroke? No   Have you ever had surgery on your heart or blood vessels, such as a  stent placement, a coronary artery bypass, or surgery on an artery in your head, neck, heart, or legs? No   Do you have chest pain with activity? No   Do you have a history of heart failure? No   Do you currently have a cold, bronchitis or symptoms of other infection? No   Do you have a cough, shortness of breath, or wheezing? No   Do you or anyone in your family have previous history of blood clots? No   Do you or does anyone in your family have a serious bleeding problem such as prolonged bleeding following surgeries or cuts? No   Have you ever had problems with anemia or been told to take iron pills? No   Have you had any abnormal blood loss such as black, tarry or bloody stools, or abnormal vaginal bleeding? No   Have you ever had a blood transfusion? No   Are you willing to have a blood transfusion if it is medically needed before, during, or after your surgery? Yes   Have you or any of your relatives ever had problems with anesthesia? No   Do you have sleep apnea, excessive snoring or daytime drowsiness? No   Do you have any artifical heart valves or other implanted medical devices like a pacemaker, defibrillator, or continuous glucose monitor? No   Do you have artificial joints? No   Are you allergic to latex? No     Health Care Directive  Patient does not have a Health Care Directive: Discussed advance care planning with patient; however, patient declined at this time.    Preoperative Review of    reviewed - no record of controlled substances prescribed.          There are no active problems to display for this patient.     Past Medical History:   Diagnosis Date    Uncomplicated asthma      No past surgical history on file.  Current Outpatient Medications   Medication Sig Dispense Refill    escitalopram (LEXAPRO) 10 MG tablet Take 1 tablet (10 mg) by mouth daily. 90 tablet 0    escitalopram (LEXAPRO) 20 MG tablet Take 1 tablet (20 mg) by mouth daily. 90 tablet 0    gabapentin (NEURONTIN) 300 MG capsule  "Take 1 capsule at bedtime prn 30 capsule 1    ketorolac (TORADOL) 10 MG tablet Take 1 tablet (10 mg) by mouth every 6 hours as needed for moderate pain. 20 tablet 0    tretinoin (RETIN-A) 0.025 % external cream Apply topically at bedtime. 45 g 0       Allergies   Allergen Reactions    Amoxicillin     Penicillin G     Voltaren [Diclofenac] Rash        Social History     Tobacco Use    Smoking status: Never    Smokeless tobacco: Never   Substance Use Topics    Alcohol use: Never       History   Drug Use Unknown               Objective    Ht 1.753 m (5' 9\")   Wt 83.9 kg (185 lb)   BMI 27.32 kg/m     Estimated body mass index is 27.32 kg/m  as calculated from the following:    Height as of this encounter: 1.753 m (5' 9\").    Weight as of this encounter: 83.9 kg (185 lb).  Physical Exam  GENERAL: alert and no distress  NECK: no adenopathy, no asymmetry, masses, or scars  RESP: lungs clear to auscultation - no rales, rhonchi or wheezes  CV: regular rate and rhythm, normal S1 S2, no S3 or S4, no murmur, click or rub, no peripheral edema  ABDOMEN: soft, nontender, no hepatosplenomegaly, no masses and bowel sounds normal  MS: no gross musculoskeletal defects noted, no edema    No results for input(s): \"HGB\", \"PLT\", \"INR\", \"NA\", \"POTASSIUM\", \"CR\", \"A1C\" in the last 8760 hours.     Diagnostics  No labs were ordered during this visit.   No EKG required, no history of coronary heart disease, significant arrhythmia, peripheral arterial disease or other structural heart disease.    Revised Cardiac Risk Index (RCRI)  The patient has the following serious cardiovascular risks for perioperative complications:   - No serious cardiac risks = 0 points     RCRI Interpretation: 0 points: Class I (very low risk - 0.4% complication rate)         Signed Electronically by: Sol Palomo MD  A copy of this evaluation report is provided to the requesting physician.           "

## 2025-05-05 DIAGNOSIS — S73.191D ACETABULAR LABRUM TEAR, RIGHT, SUBSEQUENT ENCOUNTER: Primary | ICD-10-CM

## 2025-05-06 ENCOUNTER — ANCILLARY PROCEDURE (OUTPATIENT)
Dept: GENERAL RADIOLOGY | Facility: CLINIC | Age: 25
End: 2025-05-06
Attending: ORTHOPAEDIC SURGERY
Payer: COMMERCIAL

## 2025-05-06 DIAGNOSIS — S73.191D ACETABULAR LABRUM TEAR, RIGHT, SUBSEQUENT ENCOUNTER: ICD-10-CM

## 2025-05-06 PROCEDURE — 73522 X-RAY EXAM HIPS BI 3-4 VIEWS: CPT | Performed by: RADIOLOGY

## 2025-05-08 ENCOUNTER — OFFICE VISIT (OUTPATIENT)
Dept: ORTHOPEDICS | Facility: CLINIC | Age: 25
End: 2025-05-08
Payer: COMMERCIAL

## 2025-05-08 VITALS — WEIGHT: 185 LBS | BODY MASS INDEX: 27.32 KG/M2

## 2025-05-08 DIAGNOSIS — S73.191D TEAR OF RIGHT ACETABULAR LABRUM, SUBSEQUENT ENCOUNTER: Primary | ICD-10-CM

## 2025-05-08 NOTE — PROGRESS NOTES
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 She reports doing well overall with some occasional mild anterior groin tightness/pain. She has been working on strengthening, ROM. She denies any fever, chills, chest pain, shortness of breath, calf pain, numbness or tingling.     She has graduated and will no longer be playing hockey but will be staying in [place].      There is no problem list on file for this patient.      Current Outpatient Medications   Medication Sig Dispense Refill    escitalopram (LEXAPRO) 10 MG tablet Take 1 tablet (10 mg) by mouth daily. 90 tablet 0    escitalopram (LEXAPRO) 20 MG tablet Take 1 tablet (20 mg) by mouth daily. 90 tablet 0    tretinoin (RETIN-A) 0.025 % external cream Apply topically at bedtime. 45 g 0    gabapentin (NEURONTIN) 300 MG capsule Take 1 capsule at bedtime prn (Patient not taking: Reported on 5/8/2025) 30 capsule 1    ketorolac (TORADOL) 10 MG tablet Take 1 tablet (10 mg) by mouth every 6 hours as needed for moderate pain. (Patient not taking: Reported on 5/8/2025) 20 tablet 0               OBJECTIVE:     BMI: Body mass index is 27.32 kg/m .    GENERAL:  The patient is pleasant and cooperative today and appears her stated age.   She is alert and oriented x3 and in no acute distress.    HENT:  Appearance symmetric with no discoloration or masses.  External ears and nose normal without lesion or deformity.    EYES: Pupils equal and round.  Conjunctivae and lids normal.    RESPIRATORY:  Breathing is unlabored without any audible wheezing.  NEURO/PSY: Normal affect. She shows no signs of agitation or depression.   CARDIOVASC: Well perfused extremities  SKIN:  Normal temperature, turgor, and texture; no rash.  MUSCULOSKELETAL:    Right Hip  Gait: Normal    Tenderness: None    Incisions: Well healed, no erythema       Hip Range of Motion:   Flexion  Extension IR ER   Affected 115 NT 30 60   Unaffected 120 NT 30 60     Hip Strength (out of 5):   Flexion Extension Abduction Adduction   Affected 5/5 5/5 5/5 5/5   Unaffected 5/5 5/5 5/5 5/5        Provocative Tests:    Flexion/Adduction/Internal Rotation (FADIR) Test for Labral Tear (Impingement Sign) Negative     Stinchfield Test: Positive    Log Roll Test Negative    NIKOS Test Negative with 6 fists (3 contralateral)          Distally the patient's neurovascular status is  normal with sensation intact to light touch superficial peroneal, deep peroneal, sural, saphenous, and tibial nerve distributions. Motor grossly intact EHL, Tibialis anterior, gastroc-soleus complex    Limb is  well perfused with palpable pulses and brisk capillary refill <2 seconds     IMAGING:    Multiple views of the right hip dated 5/6/25 were available for my independent interpretation in our PACS system and demonstrate stable appearance of hip s/p femoroplasty with no fracture, dislocation, AVN, or heterotopic bone formation         ASSESSMENT & PLAN:    25F  6 weeks s/p revision right hip arthroscopy with labral repair, lysis of adhesions, chondroplasty      Alta is progressing well post-operatively. She may begin to work more on anterior capsular stretching. She should continue working on strength and motion. Anticipate a gradual return to impact activities such as running around 3 months post-op dependent on functional readiness. She is not planning on returning to skating.     Options for treatment and/or follow-up care were reviewed with the patient and , Kenya Meredith ATC. Patient was actively involved in the decision making process. Patient verbalized understanding and was in agreement with the plan.    Kandy  MD Griselda  Orthopedic Surgery

## 2025-07-09 DIAGNOSIS — F41.8 DEPRESSION WITH ANXIETY: ICD-10-CM

## 2025-07-09 RX ORDER — ESCITALOPRAM OXALATE 10 MG/1
10 TABLET ORAL DAILY
Qty: 90 TABLET | Refills: 3 | Status: SHIPPED | OUTPATIENT
Start: 2025-07-09

## 2025-07-09 RX ORDER — ESCITALOPRAM OXALATE 20 MG/1
20 TABLET ORAL DAILY
Qty: 90 TABLET | Refills: 3 | Status: SHIPPED | OUTPATIENT
Start: 2025-07-09